# Patient Record
Sex: MALE | Race: WHITE | NOT HISPANIC OR LATINO
[De-identification: names, ages, dates, MRNs, and addresses within clinical notes are randomized per-mention and may not be internally consistent; named-entity substitution may affect disease eponyms.]

---

## 2017-01-09 ENCOUNTER — APPOINTMENT (OUTPATIENT)
Dept: CT IMAGING | Facility: CLINIC | Age: 72
End: 2017-01-09

## 2017-01-09 ENCOUNTER — OUTPATIENT (OUTPATIENT)
Dept: OUTPATIENT SERVICES | Facility: HOSPITAL | Age: 72
LOS: 1 days | End: 2017-01-09
Payer: MEDICARE

## 2017-01-09 DIAGNOSIS — Z00.8 ENCOUNTER FOR OTHER GENERAL EXAMINATION: ICD-10-CM

## 2017-01-09 DIAGNOSIS — Z98.89 OTHER SPECIFIED POSTPROCEDURAL STATES: Chronic | ICD-10-CM

## 2017-01-09 PROCEDURE — 82565 ASSAY OF CREATININE: CPT

## 2017-01-09 PROCEDURE — 70496 CT ANGIOGRAPHY HEAD: CPT

## 2017-01-09 PROCEDURE — 70498 CT ANGIOGRAPHY NECK: CPT

## 2017-02-21 ENCOUNTER — INPATIENT (INPATIENT)
Facility: HOSPITAL | Age: 72
LOS: 2 days | Discharge: HOME | End: 2017-02-24
Attending: STUDENT IN AN ORGANIZED HEALTH CARE EDUCATION/TRAINING PROGRAM | Admitting: INTERNAL MEDICINE
Payer: MEDICARE

## 2017-02-21 VITALS
WEIGHT: 190.04 LBS | HEIGHT: 71 IN | OXYGEN SATURATION: 100 % | DIASTOLIC BLOOD PRESSURE: 87 MMHG | HEART RATE: 72 BPM | SYSTOLIC BLOOD PRESSURE: 151 MMHG | RESPIRATION RATE: 18 BRPM

## 2017-02-21 DIAGNOSIS — Z98.89 OTHER SPECIFIED POSTPROCEDURAL STATES: Chronic | ICD-10-CM

## 2017-02-21 LAB
ALBUMIN SERPL ELPH-MCNC: 3.4 G/DL — SIGNIFICANT CHANGE UP (ref 3.3–5)
ALP SERPL-CCNC: 96 U/L — SIGNIFICANT CHANGE UP (ref 40–120)
ALT FLD-CCNC: 21 U/L — SIGNIFICANT CHANGE UP (ref 12–78)
AMMONIA BLD-MCNC: 42 UMOL/L — HIGH (ref 11–32)
AMPHET UR-MCNC: NEGATIVE — SIGNIFICANT CHANGE UP
ANION GAP SERPL CALC-SCNC: 7 MMOL/L — SIGNIFICANT CHANGE UP (ref 5–17)
APPEARANCE UR: CLEAR — SIGNIFICANT CHANGE UP
APTT BLD: 33 SEC — SIGNIFICANT CHANGE UP (ref 27.5–37.4)
AST SERPL-CCNC: 21 U/L — SIGNIFICANT CHANGE UP (ref 15–37)
BACTERIA # UR AUTO: (no result)
BARBITURATES UR SCN-MCNC: NEGATIVE — SIGNIFICANT CHANGE UP
BASOPHILS # BLD AUTO: 0.1 K/UL — SIGNIFICANT CHANGE UP (ref 0–0.2)
BASOPHILS NFR BLD AUTO: 1.3 % — SIGNIFICANT CHANGE UP (ref 0–2)
BENZODIAZ UR-MCNC: NEGATIVE — SIGNIFICANT CHANGE UP
BILIRUB SERPL-MCNC: 0.3 MG/DL — SIGNIFICANT CHANGE UP (ref 0.2–1.2)
BILIRUB UR-MCNC: NEGATIVE — SIGNIFICANT CHANGE UP
BLD GP AB SCN SERPL QL: SIGNIFICANT CHANGE UP
BUN SERPL-MCNC: 24 MG/DL — HIGH (ref 7–23)
CALCIUM SERPL-MCNC: 8.3 MG/DL — LOW (ref 8.5–10.1)
CARBAMAZEPINE SERPL-MCNC: <0.5 UG/ML — LOW (ref 4–12)
CHLORIDE SERPL-SCNC: 104 MMOL/L — SIGNIFICANT CHANGE UP (ref 96–108)
CK SERPL-CCNC: 86 U/L — SIGNIFICANT CHANGE UP (ref 26–308)
CO2 SERPL-SCNC: 26 MMOL/L — SIGNIFICANT CHANGE UP (ref 22–31)
COCAINE METAB.OTHER UR-MCNC: NEGATIVE — SIGNIFICANT CHANGE UP
COLOR SPEC: YELLOW — SIGNIFICANT CHANGE UP
CREAT SERPL-MCNC: 1.24 MG/DL — SIGNIFICANT CHANGE UP (ref 0.5–1.3)
DIFF PNL FLD: (no result)
EOSINOPHIL # BLD AUTO: 0.2 K/UL — SIGNIFICANT CHANGE UP (ref 0–0.5)
EOSINOPHIL NFR BLD AUTO: 4.5 % — SIGNIFICANT CHANGE UP (ref 0–6)
EPI CELLS # UR: SIGNIFICANT CHANGE UP
ETHANOL SERPL-MCNC: <10 MG/DL — SIGNIFICANT CHANGE UP (ref 0–10)
GLUCOSE SERPL-MCNC: 204 MG/DL — HIGH (ref 70–99)
GLUCOSE UR QL: 100 MG/DL
HCT VFR BLD CALC: 38.4 % — LOW (ref 39–50)
HGB BLD-MCNC: 13.9 G/DL — SIGNIFICANT CHANGE UP (ref 13–17)
INR BLD: 1.04 RATIO — SIGNIFICANT CHANGE UP (ref 0.88–1.16)
KETONES UR-MCNC: NEGATIVE — SIGNIFICANT CHANGE UP
LEUKOCYTE ESTERASE UR-ACNC: NEGATIVE — SIGNIFICANT CHANGE UP
LYMPHOCYTES # BLD AUTO: 1 K/UL — SIGNIFICANT CHANGE UP (ref 1–3.3)
LYMPHOCYTES # BLD AUTO: 17.9 % — SIGNIFICANT CHANGE UP (ref 13–44)
MCHC RBC-ENTMCNC: 32.2 PG — SIGNIFICANT CHANGE UP (ref 27–34)
MCHC RBC-ENTMCNC: 36.2 GM/DL — HIGH (ref 32–36)
MCV RBC AUTO: 89.1 FL — SIGNIFICANT CHANGE UP (ref 80–100)
METHADONE UR-MCNC: NEGATIVE — SIGNIFICANT CHANGE UP
MONOCYTES # BLD AUTO: 0.6 K/UL — SIGNIFICANT CHANGE UP (ref 0–0.9)
MONOCYTES NFR BLD AUTO: 12.2 % — SIGNIFICANT CHANGE UP (ref 2–14)
NEUTROPHILS # BLD AUTO: 3.4 K/UL — SIGNIFICANT CHANGE UP (ref 1.8–7.4)
NEUTROPHILS NFR BLD AUTO: 64.1 % — SIGNIFICANT CHANGE UP (ref 43–77)
NITRITE UR-MCNC: NEGATIVE — SIGNIFICANT CHANGE UP
OPIATES UR-MCNC: NEGATIVE — SIGNIFICANT CHANGE UP
PCP SPEC-MCNC: SIGNIFICANT CHANGE UP
PCP UR-MCNC: NEGATIVE — SIGNIFICANT CHANGE UP
PH UR: 5 — SIGNIFICANT CHANGE UP (ref 4.8–8)
PLATELET # BLD AUTO: 212 K/UL — SIGNIFICANT CHANGE UP (ref 150–400)
POTASSIUM SERPL-MCNC: 4.4 MMOL/L — SIGNIFICANT CHANGE UP (ref 3.5–5.3)
POTASSIUM SERPL-SCNC: 4.4 MMOL/L — SIGNIFICANT CHANGE UP (ref 3.5–5.3)
PROT SERPL-MCNC: 6.9 GM/DL — SIGNIFICANT CHANGE UP (ref 6–8.3)
PROT UR-MCNC: 100 MG/DL
PROTHROM AB SERPL-ACNC: 11.5 SEC — SIGNIFICANT CHANGE UP (ref 10–13.1)
RBC # BLD: 4.3 M/UL — SIGNIFICANT CHANGE UP (ref 4.2–5.8)
RBC # FLD: 11.6 % — SIGNIFICANT CHANGE UP (ref 10.3–14.5)
RBC CASTS # UR COMP ASSIST: (no result) /HPF (ref 0–4)
SODIUM SERPL-SCNC: 137 MMOL/L — SIGNIFICANT CHANGE UP (ref 135–145)
SP GR SPEC: 1.01 — SIGNIFICANT CHANGE UP (ref 1.01–1.02)
THC UR QL: NEGATIVE — SIGNIFICANT CHANGE UP
TROPONIN I SERPL-MCNC: <0.015 NG/ML — SIGNIFICANT CHANGE UP (ref 0.01–0.04)
TYPE + AB SCN PNL BLD: SIGNIFICANT CHANGE UP
UROBILINOGEN FLD QL: NEGATIVE MG/DL — SIGNIFICANT CHANGE UP
WBC # BLD: 5.3 K/UL — SIGNIFICANT CHANGE UP (ref 3.8–10.5)
WBC # FLD AUTO: 5.3 K/UL — SIGNIFICANT CHANGE UP (ref 3.8–10.5)
WBC UR QL: NEGATIVE — SIGNIFICANT CHANGE UP

## 2017-02-21 PROCEDURE — 70450 CT HEAD/BRAIN W/O DYE: CPT | Mod: 26

## 2017-02-21 PROCEDURE — 99285 EMERGENCY DEPT VISIT HI MDM: CPT

## 2017-02-21 PROCEDURE — 71010: CPT | Mod: 26

## 2017-02-21 RX ORDER — LEVETIRACETAM 250 MG/1
1000 TABLET, FILM COATED ORAL ONCE
Qty: 0 | Refills: 0 | Status: COMPLETED | OUTPATIENT
Start: 2017-02-21 | End: 2017-02-21

## 2017-02-21 RX ADMIN — LEVETIRACETAM 400 MILLIGRAM(S): 250 TABLET, FILM COATED ORAL at 18:23

## 2017-02-21 NOTE — ED ADULT NURSE REASSESSMENT NOTE - NS ED NURSE REASSESS COMMENT FT1
Received patient in bed, A&Ox4, in no acute distress at this time. Pending bed assignment. Cardiac monitoring in progress. Will continue monitoring patient.

## 2017-02-21 NOTE — H&P ADULT - RS GEN PE MLT RESP DETAILS PC
no chest wall tenderness/respirations non-labored/no rales/breath sounds equal/airway patent/normal/good air movement/no wheezes/no rhonchi/clear to auscultation bilaterally
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
## 2017-02-21 NOTE — ED ADULT NURSE NOTE - OBJECTIVE STATEMENT
Rec'd pt s/p seizure earlier today. pt has PMH hx of seizures from childhood. Pt verbalized that he does not feel total control of what is going on the last few days and not 100% right. pt  A&0x4. VSS. IVL estab. labs drawn. no acute distress, will mtr.
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
## 2017-02-21 NOTE — H&P ADULT - NSHPLABSRESULTS_GEN_ALL_CORE
T(C): 36.8, Max: 36.8 (02-21 @ 19:31)  HR: 65 (65 - 72)  BP: 141/76 (141/76 - 151/87)  RR: 16 (15 - 18)  SpO2: 99% (99% - 100%)      EXAM:  CT BRAIN                            PROCEDURE DATE:  02/21/2017      IMPRESSION:     No acute intracranial hemorrhage, mass effect, or midline shift.     Stable left parietal gyral calcification and atrophy in association with   Sturge-Quinonez syndrome.

## 2017-02-21 NOTE — H&P ADULT - ASSESSMENT
72 y/o M with a hx of PMHx notable for HTN, hyperlipidemia, vertebral artery dissection DM type II, CKD III, asthma, and seizures (on keppra  & trileptal), sturge lorenzo syndrome, epilepsy, presents to ED BIBA s/p seizure. Being admitted for seizures while on keppra and trileptal 70 y/o M with a hx of PMHx notable for HTN, hyperlipidemia, vertebral artery dissection DM type II, CKD III, asthma, and seizures (on keppra  & trileptal), sturge lorenzo syndrome, epilepsy, presents to ED BIBA s/p seizure. Being admitted for seizures while on keppra and trileptal.
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
## 2017-02-21 NOTE — ED PROVIDER NOTE - NS ED MD SCRIBE ATTENDING SCRIBE SECTIONS
PAST MEDICAL/SURGICAL/SOCIAL HISTORY/REVIEW OF SYSTEMS/PHYSICAL EXAM/RESULTS/DISPOSITION/HISTORY OF PRESENT ILLNESS/CONSULTATIONS/SHIFT CHANGE/PROGRESS NOTE

## 2017-02-21 NOTE — ED PROVIDER NOTE - OBJECTIVE STATEMENT
70 y/o M with a hx of vertebral artery dissection, epilepsy, HTN, DM, CKD presents to ED BIBA s/p wagner.  Pt states he does not have any complaints at this time. Pt states he has had intermittent difficulty with coordination and "not feeling totally in control of himself." Today pt was in [employer] and had 2 seizures. Pt lives at home alone.

## 2017-02-21 NOTE — H&P ADULT - PROBLEM SELECTOR PLAN 1
# Breakthrough Seizure  - Cont Keppra and Trileptal  - Consider MRI of the brain  - Neuro Consult  - Monitor on tele  - Trileptal level low, consider increasing the dosage  - Fall and Aspiration precaution  - Ativan IV PRN for seizure like activity # Breakthrough Seizure  - Cont Keppra and Trileptal  - Consider MRI of the brain  - Neuro Consult  - Monitor on tele  - Trileptal level low, consider increasing the dosage  - F/U Keppra level in the am  - Fall, Seizure and Aspiration precautions  - Ativan IV PRN for seizure like activity

## 2017-02-21 NOTE — H&P ADULT - PROBLEM SELECTOR PLAN 8
# Most likely 2/2 Renal disease vs Muscle exertion 2/2 to Seizures  - Out pt follow up and monitoring

## 2017-02-21 NOTE — ED PROVIDER NOTE - DETAILS:
I, Dwight Bajwa, performed the initial face to face bedside interview with this patient regarding history of present illness and determined that the patient should be seen in the main ED.  The history, was documented by the scribe in my presence and I attest to the accuracy of the documentation.

## 2017-02-22 DIAGNOSIS — E72.20 DISORDER OF UREA CYCLE METABOLISM, UNSPECIFIED: ICD-10-CM

## 2017-02-22 DIAGNOSIS — H53.2 DIPLOPIA: ICD-10-CM

## 2017-02-22 DIAGNOSIS — E03.9 HYPOTHYROIDISM, UNSPECIFIED: ICD-10-CM

## 2017-02-22 DIAGNOSIS — I10 ESSENTIAL (PRIMARY) HYPERTENSION: ICD-10-CM

## 2017-02-22 DIAGNOSIS — Z29.9 ENCOUNTER FOR PROPHYLACTIC MEASURES, UNSPECIFIED: ICD-10-CM

## 2017-02-22 DIAGNOSIS — E78.5 HYPERLIPIDEMIA, UNSPECIFIED: ICD-10-CM

## 2017-02-22 DIAGNOSIS — R56.9 UNSPECIFIED CONVULSIONS: ICD-10-CM

## 2017-02-22 DIAGNOSIS — N18.3 CHRONIC KIDNEY DISEASE, STAGE 3 (MODERATE): ICD-10-CM

## 2017-02-22 DIAGNOSIS — E11.9 TYPE 2 DIABETES MELLITUS WITHOUT COMPLICATIONS: ICD-10-CM

## 2017-02-22 DIAGNOSIS — Q85.8 OTHER PHAKOMATOSES, NOT ELSEWHERE CLASSIFIED: ICD-10-CM

## 2017-02-22 LAB
ALBUMIN SERPL ELPH-MCNC: 2.6 G/DL — LOW (ref 3.3–5)
ALBUMIN SERPL ELPH-MCNC: 2.9 G/DL — LOW (ref 3.3–5)
ALP SERPL-CCNC: 73 U/L — SIGNIFICANT CHANGE UP (ref 40–120)
ALP SERPL-CCNC: 82 U/L — SIGNIFICANT CHANGE UP (ref 40–120)
ALT FLD-CCNC: 14 U/L — SIGNIFICANT CHANGE UP (ref 12–78)
ALT FLD-CCNC: 14 U/L — SIGNIFICANT CHANGE UP (ref 12–78)
ANION GAP SERPL CALC-SCNC: 8 MMOL/L — SIGNIFICANT CHANGE UP (ref 5–17)
ANION GAP SERPL CALC-SCNC: 9 MMOL/L — SIGNIFICANT CHANGE UP (ref 5–17)
APPEARANCE UR: CLEAR — SIGNIFICANT CHANGE UP
AST SERPL-CCNC: 15 U/L — SIGNIFICANT CHANGE UP (ref 15–37)
AST SERPL-CCNC: 19 U/L — SIGNIFICANT CHANGE UP (ref 15–37)
BACTERIA # UR AUTO: (no result)
BASOPHILS # BLD AUTO: 0.1 K/UL — SIGNIFICANT CHANGE UP (ref 0–0.2)
BASOPHILS NFR BLD AUTO: 0.7 % — SIGNIFICANT CHANGE UP (ref 0–2)
BILIRUB SERPL-MCNC: 0.4 MG/DL — SIGNIFICANT CHANGE UP (ref 0.2–1.2)
BILIRUB SERPL-MCNC: 0.5 MG/DL — SIGNIFICANT CHANGE UP (ref 0.2–1.2)
BILIRUB UR-MCNC: NEGATIVE — SIGNIFICANT CHANGE UP
BUN SERPL-MCNC: 19 MG/DL — SIGNIFICANT CHANGE UP (ref 7–23)
BUN SERPL-MCNC: 22 MG/DL — SIGNIFICANT CHANGE UP (ref 7–23)
CALCIUM SERPL-MCNC: 7.6 MG/DL — LOW (ref 8.5–10.1)
CALCIUM SERPL-MCNC: 8.1 MG/DL — LOW (ref 8.5–10.1)
CHLORIDE SERPL-SCNC: 102 MMOL/L — SIGNIFICANT CHANGE UP (ref 96–108)
CHLORIDE SERPL-SCNC: 106 MMOL/L — SIGNIFICANT CHANGE UP (ref 96–108)
CO2 SERPL-SCNC: 23 MMOL/L — SIGNIFICANT CHANGE UP (ref 22–31)
CO2 SERPL-SCNC: 27 MMOL/L — SIGNIFICANT CHANGE UP (ref 22–31)
COLOR SPEC: YELLOW — SIGNIFICANT CHANGE UP
CREAT SERPL-MCNC: 1.07 MG/DL — SIGNIFICANT CHANGE UP (ref 0.5–1.3)
CREAT SERPL-MCNC: 1.42 MG/DL — HIGH (ref 0.5–1.3)
CULTURE RESULTS: NO GROWTH — SIGNIFICANT CHANGE UP
DIFF PNL FLD: (no result)
EOSINOPHIL # BLD AUTO: 0.1 K/UL — SIGNIFICANT CHANGE UP (ref 0–0.5)
EOSINOPHIL NFR BLD AUTO: 0.7 % — SIGNIFICANT CHANGE UP (ref 0–6)
EPI CELLS # UR: SIGNIFICANT CHANGE UP
GLUCOSE SERPL-MCNC: 211 MG/DL — HIGH (ref 70–99)
GLUCOSE SERPL-MCNC: 274 MG/DL — HIGH (ref 70–99)
GLUCOSE UR QL: 50 MG/DL
HBA1C BLD-MCNC: 8.3 % — HIGH (ref 4–5.6)
HCT VFR BLD CALC: 33.2 % — LOW (ref 39–50)
HCT VFR BLD CALC: 34.9 % — LOW (ref 39–50)
HGB BLD-MCNC: 11.9 G/DL — LOW (ref 13–17)
HGB BLD-MCNC: 12.2 G/DL — LOW (ref 13–17)
HMPV RNA SPEC QL NAA+PROBE: DETECTED
KETONES UR-MCNC: NEGATIVE — SIGNIFICANT CHANGE UP
LACTATE SERPL-SCNC: 1.7 MMOL/L — SIGNIFICANT CHANGE UP (ref 0.7–2)
LEUKOCYTE ESTERASE UR-ACNC: NEGATIVE — SIGNIFICANT CHANGE UP
LYMPHOCYTES # BLD AUTO: 0.8 K/UL — LOW (ref 1–3.3)
LYMPHOCYTES # BLD AUTO: 9.8 % — LOW (ref 13–44)
MAGNESIUM SERPL-MCNC: 1.9 MG/DL — SIGNIFICANT CHANGE UP (ref 1.8–2.4)
MCHC RBC-ENTMCNC: 30.9 PG — SIGNIFICANT CHANGE UP (ref 27–34)
MCHC RBC-ENTMCNC: 31.4 PG — SIGNIFICANT CHANGE UP (ref 27–34)
MCHC RBC-ENTMCNC: 34.9 GM/DL — SIGNIFICANT CHANGE UP (ref 32–36)
MCHC RBC-ENTMCNC: 35.8 GM/DL — SIGNIFICANT CHANGE UP (ref 32–36)
MCV RBC AUTO: 87.6 FL — SIGNIFICANT CHANGE UP (ref 80–100)
MCV RBC AUTO: 88.5 FL — SIGNIFICANT CHANGE UP (ref 80–100)
MONOCYTES # BLD AUTO: 0.8 K/UL — SIGNIFICANT CHANGE UP (ref 0–0.9)
MONOCYTES NFR BLD AUTO: 10.5 % — SIGNIFICANT CHANGE UP (ref 2–14)
NEUTROPHILS # BLD AUTO: 6.2 K/UL — SIGNIFICANT CHANGE UP (ref 1.8–7.4)
NEUTROPHILS NFR BLD AUTO: 78.3 % — HIGH (ref 43–77)
NITRITE UR-MCNC: NEGATIVE — SIGNIFICANT CHANGE UP
PH UR: 5 — SIGNIFICANT CHANGE UP (ref 4.8–8)
PHOSPHATE SERPL-MCNC: 2.5 MG/DL — SIGNIFICANT CHANGE UP (ref 2.5–4.5)
PLATELET # BLD AUTO: 177 K/UL — SIGNIFICANT CHANGE UP (ref 150–400)
PLATELET # BLD AUTO: 206 K/UL — SIGNIFICANT CHANGE UP (ref 150–400)
POTASSIUM SERPL-MCNC: 4 MMOL/L — SIGNIFICANT CHANGE UP (ref 3.5–5.3)
POTASSIUM SERPL-MCNC: 4.6 MMOL/L — SIGNIFICANT CHANGE UP (ref 3.5–5.3)
POTASSIUM SERPL-SCNC: 4 MMOL/L — SIGNIFICANT CHANGE UP (ref 3.5–5.3)
POTASSIUM SERPL-SCNC: 4.6 MMOL/L — SIGNIFICANT CHANGE UP (ref 3.5–5.3)
PROT SERPL-MCNC: 5.6 GM/DL — LOW (ref 6–8.3)
PROT SERPL-MCNC: 5.9 GM/DL — LOW (ref 6–8.3)
PROT UR-MCNC: 100 MG/DL
RAPID RVP RESULT: DETECTED
RBC # BLD: 3.79 M/UL — LOW (ref 4.2–5.8)
RBC # BLD: 3.94 M/UL — LOW (ref 4.2–5.8)
RBC # FLD: 11.6 % — SIGNIFICANT CHANGE UP (ref 10.3–14.5)
RBC # FLD: 12 % — SIGNIFICANT CHANGE UP (ref 10.3–14.5)
RBC CASTS # UR COMP ASSIST: (no result) /HPF (ref 0–4)
SODIUM SERPL-SCNC: 137 MMOL/L — SIGNIFICANT CHANGE UP (ref 135–145)
SODIUM SERPL-SCNC: 138 MMOL/L — SIGNIFICANT CHANGE UP (ref 135–145)
SP GR SPEC: 1.01 — SIGNIFICANT CHANGE UP (ref 1.01–1.02)
SPECIMEN SOURCE: SIGNIFICANT CHANGE UP
TSH SERPL-MCNC: 1.86 UIU/ML — SIGNIFICANT CHANGE UP (ref 0.36–3.74)
UROBILINOGEN FLD QL: NEGATIVE MG/DL — SIGNIFICANT CHANGE UP
WBC # BLD: 5.7 K/UL — SIGNIFICANT CHANGE UP (ref 3.8–10.5)
WBC # BLD: 7.9 K/UL — SIGNIFICANT CHANGE UP (ref 3.8–10.5)
WBC # FLD AUTO: 5.7 K/UL — SIGNIFICANT CHANGE UP (ref 3.8–10.5)
WBC # FLD AUTO: 7.9 K/UL — SIGNIFICANT CHANGE UP (ref 3.8–10.5)
WBC UR QL: SIGNIFICANT CHANGE UP

## 2017-02-22 PROCEDURE — 70547 MR ANGIOGRAPHY NECK W/O DYE: CPT | Mod: 26

## 2017-02-22 PROCEDURE — 95951: CPT | Mod: 26,52,59

## 2017-02-22 PROCEDURE — 70551 MRI BRAIN STEM W/O DYE: CPT | Mod: 26

## 2017-02-22 PROCEDURE — 95812 EEG 41-60 MINUTES: CPT | Mod: 26

## 2017-02-22 PROCEDURE — 71020: CPT | Mod: 26

## 2017-02-22 PROCEDURE — 99223 1ST HOSP IP/OBS HIGH 75: CPT

## 2017-02-22 RX ORDER — ATORVASTATIN CALCIUM 80 MG/1
20 TABLET, FILM COATED ORAL AT BEDTIME
Qty: 0 | Refills: 0 | Status: DISCONTINUED | OUTPATIENT
Start: 2017-02-22 | End: 2017-02-24

## 2017-02-22 RX ORDER — DEXTROSE 50 % IN WATER 50 %
12.5 SYRINGE (ML) INTRAVENOUS ONCE
Qty: 0 | Refills: 0 | Status: DISCONTINUED | OUTPATIENT
Start: 2017-02-22 | End: 2017-02-24

## 2017-02-22 RX ORDER — DEXTROSE 50 % IN WATER 50 %
25 SYRINGE (ML) INTRAVENOUS ONCE
Qty: 0 | Refills: 0 | Status: DISCONTINUED | OUTPATIENT
Start: 2017-02-22 | End: 2017-02-24

## 2017-02-22 RX ORDER — ALPRAZOLAM 0.25 MG
0.25 TABLET ORAL ONCE
Qty: 0 | Refills: 0 | Status: DISCONTINUED | OUTPATIENT
Start: 2017-02-22 | End: 2017-02-22

## 2017-02-22 RX ORDER — OXCARBAZEPINE 300 MG/1
450 TABLET, FILM COATED ORAL
Qty: 0 | Refills: 0 | Status: DISCONTINUED | OUTPATIENT
Start: 2017-02-22 | End: 2017-02-24

## 2017-02-22 RX ORDER — LEVETIRACETAM 250 MG/1
500 TABLET, FILM COATED ORAL ONCE
Qty: 0 | Refills: 0 | Status: COMPLETED | OUTPATIENT
Start: 2017-02-22 | End: 2017-02-22

## 2017-02-22 RX ORDER — DEXTROSE 50 % IN WATER 50 %
1 SYRINGE (ML) INTRAVENOUS ONCE
Qty: 0 | Refills: 0 | Status: DISCONTINUED | OUTPATIENT
Start: 2017-02-22 | End: 2017-02-24

## 2017-02-22 RX ORDER — ALPRAZOLAM 0.25 MG
0.25 TABLET ORAL AT BEDTIME
Qty: 0 | Refills: 0 | Status: DISCONTINUED | OUTPATIENT
Start: 2017-02-22 | End: 2017-02-24

## 2017-02-22 RX ORDER — GLUCAGON INJECTION, SOLUTION 0.5 MG/.1ML
1 INJECTION, SOLUTION SUBCUTANEOUS ONCE
Qty: 0 | Refills: 0 | Status: DISCONTINUED | OUTPATIENT
Start: 2017-02-22 | End: 2017-02-24

## 2017-02-22 RX ORDER — METOPROLOL TARTRATE 50 MG
50 TABLET ORAL
Qty: 0 | Refills: 0 | Status: DISCONTINUED | OUTPATIENT
Start: 2017-02-22 | End: 2017-02-24

## 2017-02-22 RX ORDER — SODIUM CHLORIDE 9 MG/ML
1000 INJECTION, SOLUTION INTRAVENOUS
Qty: 0 | Refills: 0 | Status: DISCONTINUED | OUTPATIENT
Start: 2017-02-22 | End: 2017-02-24

## 2017-02-22 RX ORDER — ACETAMINOPHEN 500 MG
650 TABLET ORAL ONCE
Qty: 0 | Refills: 0 | Status: COMPLETED | OUTPATIENT
Start: 2017-02-22 | End: 2017-02-22

## 2017-02-22 RX ORDER — INSULIN LISPRO 100/ML
VIAL (ML) SUBCUTANEOUS
Qty: 0 | Refills: 0 | Status: DISCONTINUED | OUTPATIENT
Start: 2017-02-22 | End: 2017-02-24

## 2017-02-22 RX ORDER — OXCARBAZEPINE 300 MG/1
450 TABLET, FILM COATED ORAL ONCE
Qty: 0 | Refills: 0 | Status: COMPLETED | OUTPATIENT
Start: 2017-02-22 | End: 2017-02-22

## 2017-02-22 RX ORDER — PANTOPRAZOLE SODIUM 20 MG/1
40 TABLET, DELAYED RELEASE ORAL
Qty: 0 | Refills: 0 | Status: DISCONTINUED | OUTPATIENT
Start: 2017-02-22 | End: 2017-02-24

## 2017-02-22 RX ORDER — LEVOTHYROXINE SODIUM 125 MCG
75 TABLET ORAL AT BEDTIME
Qty: 0 | Refills: 0 | Status: DISCONTINUED | OUTPATIENT
Start: 2017-02-22 | End: 2017-02-24

## 2017-02-22 RX ORDER — HEPARIN SODIUM 5000 [USP'U]/ML
5000 INJECTION INTRAVENOUS; SUBCUTANEOUS EVERY 12 HOURS
Qty: 0 | Refills: 0 | Status: DISCONTINUED | OUTPATIENT
Start: 2017-02-22 | End: 2017-02-22

## 2017-02-22 RX ORDER — INSULIN LISPRO 100/ML
VIAL (ML) SUBCUTANEOUS AT BEDTIME
Qty: 0 | Refills: 0 | Status: DISCONTINUED | OUTPATIENT
Start: 2017-02-22 | End: 2017-02-24

## 2017-02-22 RX ORDER — SODIUM CHLORIDE 9 MG/ML
1000 INJECTION INTRAMUSCULAR; INTRAVENOUS; SUBCUTANEOUS ONCE
Qty: 0 | Refills: 0 | Status: COMPLETED | OUTPATIENT
Start: 2017-02-22 | End: 2017-02-22

## 2017-02-22 RX ORDER — LEVETIRACETAM 250 MG/1
1500 TABLET, FILM COATED ORAL ONCE
Qty: 0 | Refills: 0 | Status: DISCONTINUED | OUTPATIENT
Start: 2017-02-22 | End: 2017-02-22

## 2017-02-22 RX ORDER — LEVETIRACETAM 250 MG/1
1500 TABLET, FILM COATED ORAL
Qty: 0 | Refills: 0 | Status: DISCONTINUED | OUTPATIENT
Start: 2017-02-22 | End: 2017-02-24

## 2017-02-22 RX ADMIN — Medication 5 MILLIGRAM(S): at 07:00

## 2017-02-22 RX ADMIN — OXCARBAZEPINE 450 MILLIGRAM(S): 300 TABLET, FILM COATED ORAL at 11:34

## 2017-02-22 RX ADMIN — Medication 75 MICROGRAM(S): at 23:02

## 2017-02-22 RX ADMIN — Medication 3: at 12:11

## 2017-02-22 RX ADMIN — Medication 0.25 MILLIGRAM(S): at 02:01

## 2017-02-22 RX ADMIN — LEVETIRACETAM 500 MILLIGRAM(S): 250 TABLET, FILM COATED ORAL at 02:01

## 2017-02-22 RX ADMIN — LEVETIRACETAM 1500 MILLIGRAM(S): 250 TABLET, FILM COATED ORAL at 23:01

## 2017-02-22 RX ADMIN — Medication 2: at 08:57

## 2017-02-22 RX ADMIN — LEVETIRACETAM 1500 MILLIGRAM(S): 250 TABLET, FILM COATED ORAL at 11:33

## 2017-02-22 RX ADMIN — Medication 0.25 MILLIGRAM(S): at 23:02

## 2017-02-22 RX ADMIN — Medication 50 MILLIGRAM(S): at 03:00

## 2017-02-22 RX ADMIN — Medication 50 MILLIGRAM(S): at 18:28

## 2017-02-22 RX ADMIN — OXCARBAZEPINE 450 MILLIGRAM(S): 300 TABLET, FILM COATED ORAL at 23:01

## 2017-02-22 RX ADMIN — Medication 2: at 18:29

## 2017-02-22 RX ADMIN — OXCARBAZEPINE 450 MILLIGRAM(S): 300 TABLET, FILM COATED ORAL at 02:01

## 2017-02-22 RX ADMIN — Medication 650 MILLIGRAM(S): at 18:37

## 2017-02-22 RX ADMIN — ATORVASTATIN CALCIUM 20 MILLIGRAM(S): 80 TABLET, FILM COATED ORAL at 23:01

## 2017-02-22 RX ADMIN — Medication 1: at 23:01

## 2017-02-22 RX ADMIN — SODIUM CHLORIDE 1000 MILLILITER(S): 9 INJECTION INTRAMUSCULAR; INTRAVENOUS; SUBCUTANEOUS at 18:31

## 2017-02-22 RX ADMIN — PANTOPRAZOLE SODIUM 40 MILLIGRAM(S): 20 TABLET, DELAYED RELEASE ORAL at 09:00
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
## 2017-02-22 NOTE — PROVIDER CONTACT NOTE (OTHER) - ACTION/TREATMENT ORDERED:
Dr. López aware via telephone. awaiting MD to assess pt. at bedside
at 19:15 pt. less confused pt. following commands at this time answering questions approriately
cardiology consult, awaiting oxycodone orders.
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
## 2017-02-22 NOTE — PROGRESS NOTE ADULT - SUBJECTIVE AND OBJECTIVE BOX
History of Present Illness: 	    70 y/o M with a hx of PMHx notable for HTN, hyperlipidemia, vertebral artery dissection DM type II, CKD III, asthma, and seizures (on keppra  & trileptal), sturge lorenzo syndrome, epilepsy, presents to ED BIBA s/p seizure.  Pt states he does not have any complaints at this time. Pt states he has had intermittent difficulty with coordination and "not feeling totally in control of himself." Pt lives at home alone.    Pt states for the past 2 weeks he hasn't felt well, he felt that something is not right, he became more forgetful and misplaced more things. 3 days ago pt states he had a seizure that he was unaware of , his friend at the [facility] told him about the seizure. Pt also reports for the past 3 months he has been noticing bite marks on the side of his tongue, also had double vision, was supposed to see a ophthalmologist but never followed up. Pt stated today he went to [employer] to eat as he goes there regularly Pt states he is unaware of anything that happened at [facility] but the manager called 911 as pt did not look well. Denies any CP, NVD, HA, confusion, Bowel or Bladder incontinence. Pt states he takes his medications on time and does not remember the last time he had a seizure, states 3 days ago he is sure he had a seizure also states he suspects he is having seizures at home that he does not realize as he does not have post ictal confusion, loss of bowel or bladder function.    : seen and eval. hemodynamically stable. Denies any HA, CP, SOB. No fevers, chills or shakes. no further seizures.     REVIEW OF SYSTEMS:  General: NAD, hemodynamically stable, (-)  fever, (-) chills, (-) weakness  HEENT:  Eyes:  No visual loss, blurred vision, double vision or yellow sclerae. Ears, Nose, Throat:  No hearing loss, sneezing, congestion, runny nose or sore throat.  SKIN:  No rash or itching.  CARDIOVASCULAR:  No chest pain, chest pressure or chest discomfort. No palpitations or edema.  RESPIRATORY:  No shortness of breath, cough or sputum.  GASTROINTESTINAL:  No anorexia, nausea, vomiting or diarrhea. No abdominal pain or blood.  NEUROLOGICAL:  No headache, dizziness, syncope, paralysis, ataxia, numbness or tingling in the extremities. No change in bowel or bladder control.  MUSCULOSKELETAL:  No muscle, back pain, joint pain or stiffness.  HEMATOLOGIC:  No anemia, bleeding or bruising.  LYMPHATICS:  No enlarged nodes. No history of splenectomy.  ENDOCRINOLOGIC:  No reports of sweating, cold or heat intolerance. No polyuria or polydipsia.  ALLERGIES:  No history of asthma, hives, eczema or rhinitis.    Physical Exam:   GENERAL APPEARANCE:  NAD, hemodynamically stable  T(C): 37.8, Max: 37.8 (- @ 10:10)  HR: 75 (65 - 81)  BP: 148/67 (141/76 - 197/93)  RR: 16 (13 - 22)  SpO2: 96% (96% - 100%)  Wt(kg): --  HEENT:  Head is normocephalic    Skin:  Warm and dry without any rash   NECK:  Supple without lymphadenopathy.   HEART:  Regular rate and rhythm. normal S1 and S2, No M/R/G  LUNGS:  Good ins/exp effort, no W/R/R/C  ABDOMEN:  Soft, nontender, nondistended with good bowel sounds heard  EXTREMITIES:  Without cyanosis, clubbing or edema.   NEUROLOGICAL:  Gross nonfocal       CBC Full  -  ( 2017 05:03 )  WBC Count : 5.7 K/uL  Hemoglobin : 12.2 g/dL  Hematocrit : 34.9 %  Platelet Count - Automated : 206 K/uL  Mean Cell Volume : 88.5 fl  Mean Cell Hemoglobin : 30.9 pg  Mean Cell Hemoglobin Concentration : 34.9 gm/dL  Auto Neutrophil # : x  Auto Lymphocyte # : x  Auto Monocyte # : x  Auto Eosinophil # : x  Auto Basophil # : x  Auto Neutrophil % : x  Auto Lymphocyte % : x  Auto Monocyte % : x  Auto Eosinophil % : x  Auto Basophil % : x    PT/INR - ( 2017 17:14 )   PT: 11.5 sec;   INR: 1.04 ratio         PTT - ( 2017 17:14 )  PTT:33.0 sec  Urinalysis Basic - ( 2017 21:02 )    Color: Yellow / Appearance: Clear / S.015 / pH: x  Gluc: x / Ketone: Negative  / Bili: Negative / Urobili: Negative mg/dL   Blood: x / Protein: 100 mg/dL / Nitrite: Negative   Leuk Esterase: Negative / RBC: 3-5 /HPF / WBC Negative   Sq Epi: x / Non Sq Epi: Few / Bacteria: Few      2017 05:03    137    |  102    |  19     ----------------------------<  211    4.0     |  27     |  1.07     Ca    8.1        2017 05:03  Phos  2.5       2017 05:03  Mg     1.9       2017 05:03    TPro  5.9    /  Alb  2.9    /  TBili  0.5    /  DBili  x      /  AST  19     /  ALT  14     /  AlkPhos  82     2017 05:03        *AMS / possible break through seizure  - Check Trileptal levels.  - EEG  - MRI brain with MRA h/o VA dissection in past.  - appreciated neuro follow up  - will call Dr. Bazan for med rec.     *Elevated ammonia level  -Normal transaminases   -Possibly 2/2 inborn errors of metabolism?  -Further work up outpatient    *HTN  -Low salt diet  -C/w home meds    *DM2  -Humalog ISS  -Diabetic diet    *Hypothyroidism  -C/w synthroid  -Check TSH    *DVT ppx  -SCDs.

## 2017-02-22 NOTE — PROGRESS NOTE ADULT - SUBJECTIVE AND OBJECTIVE BOX
RRT called earlier - Dr Whitten who admitted the patient a few hours earlier also at bedside.    Suggests that pts transient dysarthria/confusion is similar to the seizure episodes (atypical) that pt had prior to admission.    Pt resolved completely.    Dr Whitten asked to let Dr Gonzalez know about the event as she was admitting md    also asked resident team to call neurologist now and inform of status.

## 2017-02-22 NOTE — PROVIDER CONTACT NOTE (CHANGE IN STATUS NOTIFICATION) - ASSESSMENT
Confusion and expressive aphasia lasted approximately 5 minutes, twitching of arms and legs about 2 minutes.

## 2017-02-22 NOTE — PROVIDER CONTACT NOTE (OTHER) - SITUATION
pt. alert. pt. not cooperative not answering questions appropriately. temp 102.9
pt. /84
pt. admitted to [facility] from ED with SOB

## 2017-02-22 NOTE — PROVIDER CONTACT NOTE (OTHER) - ASSESSMENT
pt. change in mental status elevated temp. pt. forgetful
Blood pressure elevated
pt. sitting up in bed complaining of 7 out of 10 pain in lower back with out relief of tylenol. pt. short of breath. lung sounds diminished. O2 sat 95%

## 2017-02-22 NOTE — PROVIDER CONTACT NOTE (CHANGE IN STATUS NOTIFICATION) - SITUATION
Patient having expressive aphasia, confusion, twitching of arms and legs, and restlessness upon assessment.

## 2017-02-22 NOTE — CHART NOTE - NSCHARTNOTEFT_GEN_A_CORE
HPI:  70 y/o M with a hx of PMHx notable for HTN, hyperlipidemia, vertebral artery dissection DM type II, CKD III, asthma, and seizures (on keppra  & trileptal), sturge lorenzo syndrome, epilepsy, presents to ED BIBA s/p seizure.  Pt states he does not have any complaints at this time. Pt states he has had intermittent difficulty with coordination and "not feeling totally in control of himself." Pt lives at home alone.    Pt states for the past 2 weeks he hasn't felt well, he felt that something is not right, he became more forgetful and misplaced more things. 3 days ago pt states he had a seizure that he was unaware of , his friend at the [facility] told him about the seizure. Pt also reports for the past 3 months he has been noticing bite marks on the side of his tongue, also had double vision, was supposed to see a ophthalmologist but never followed up. Pt stated today he went to [facility] to eat as he goes there regularly Pt states he is unaware of anything that happened at [facility] but the manager called 911 as pt did not look well. Denies any CP, NVD, HA, confusion, Bowel or Bladder incontinence. Pt states he takes his medications on time and does not remember the last time he had a seizure, states 3 days ago he is sure he had a seizure also states he suspects he is having seizures at home that he does not realize as he does not have post ictal confusion, loss of bowel or bladder function. (21 Feb 2017 23:18)      called to evaluate the pt due to fever and increasing confusion. As per the nurse pt was found sitting in bed confused and felt warm to touch. When his temp was taken it was 102. He was also tachycardic. Pt examined bedside. Denies any pain. Reports lingering cough from URI 2 weeks ago. Denies any urinary symptoms, or GI symptoms.       Vital Signs Last 24 Hrs  T(F): 102,   HR: 92 (65 - 87)  BP: 141/71 (141/76 - 197/93)  RR: 17 (13 - 22)  SpO2: 96% (92% - 100%)    I&O's Summary  I & Os for 24h ending 22 Feb 2017 07:00  =============================================  IN: 120 ml / OUT: 800 ml / NET: -680 ml    I & Os for current day (as of 22 Feb 2017 18:10)  =============================================  IN: 240 ml / OUT: 0 ml / NET: 240 ml      CAPILLARY BLOOD GLUCOSE  289 (22 Feb 2017 11:31)  206 (22 Feb 2017 07:53)      PHYSICAL EXAM:  Constitutional: NAD, awake   Neck: Soft and supple  Respiratory: Breath sounds are clear bilaterally  Cardiovascular: S1 and S2, regular rate and rhythm  Gastrointestinal: Bowel Sounds present, soft, nontender, nondistended, no guarding, no rebound    Medications:  MEDICATIONS  (STANDING):  enalapril 5milliGRAM(s) Oral daily  levETIRAcetam 1500milliGRAM(s) Oral two times a day  OXcarbazepine 450milliGRAM(s) Oral two times a day  atorvastatin 20milliGRAM(s) Oral at bedtime  ALPRAZolam 0.25milliGRAM(s) Oral at bedtime  metoprolol 50milliGRAM(s) Oral two times a day  pantoprazole    Tablet 40milliGRAM(s) Oral before breakfast  levothyroxine 75MICROGram(s) Oral at bedtime  insulin lispro (HumaLOG) corrective regimen sliding scale  SubCutaneous three times a day before meals  insulin lispro (HumaLOG) corrective regimen sliding scale  SubCutaneous at bedtime  dextrose 5%. 1000milliLiter(s) IV Continuous <Continuous>  dextrose 50% Injectable 12.5Gram(s) IV Push once  dextrose 50% Injectable 25Gram(s) IV Push once  dextrose 50% Injectable 25Gram(s) IV Push once    MEDICATIONS  (PRN):  LORazepam   Injectable 4milliGRAM(s) IV Push once PRN Seizure activity  dextrose Gel 1Dose(s) Oral once PRN Blood Glucose LESS THAN 70 milliGRAM(s)/deciliter  glucagon  Injectable 1milliGRAM(s) IntraMuscular once PRN Glucose LESS THAN 70 milligrams/deciliter      Labs: All Labs Reviewed:                        12.2   5.7   )-----------( 206      ( 22 Feb 2017 05:03 )             34.9     22 Feb 2017 05:03    137    |  102    |  19     ----------------------------<  211    4.0     |  27     |  1.07     Ca    8.1        22 Feb 2017 05:03  Phos  2.5       22 Feb 2017 05:03  Mg     1.9       22 Feb 2017 05:03    TPro  5.9    /  Alb  2.9    /  TBili  0.5    /  DBili  x      /  AST  19     /  ALT  14     /  AlkPhos  82     22 Feb 2017 05:03    PT/INR - ( 21 Feb 2017 17:14 )   PT: 11.5 sec;   INR: 1.04 ratio         PTT - ( 21 Feb 2017 17:14 )  PTT:33.0 sec  CARDIAC MARKERS ( 21 Feb 2017 17:14 )  <0.015 ng/mL / x     / 86 U/L / x     / x        A/P   r/o sepsis   -f/u CBC, Lactate, UA, BC, chest x-ray   -IFV  -start antibiotics if indicated

## 2017-02-23 LAB
ANION GAP SERPL CALC-SCNC: 10 MMOL/L — SIGNIFICANT CHANGE UP (ref 5–17)
BASOPHILS # BLD AUTO: 0.1 K/UL — SIGNIFICANT CHANGE UP (ref 0–0.2)
BASOPHILS NFR BLD AUTO: 0.7 % — SIGNIFICANT CHANGE UP (ref 0–2)
BUN SERPL-MCNC: 21 MG/DL — SIGNIFICANT CHANGE UP (ref 7–23)
CALCIUM SERPL-MCNC: 7.7 MG/DL — LOW (ref 8.5–10.1)
CHLORIDE SERPL-SCNC: 105 MMOL/L — SIGNIFICANT CHANGE UP (ref 96–108)
CO2 SERPL-SCNC: 25 MMOL/L — SIGNIFICANT CHANGE UP (ref 22–31)
CREAT SERPL-MCNC: 1.18 MG/DL — SIGNIFICANT CHANGE UP (ref 0.5–1.3)
EOSINOPHIL # BLD AUTO: 0.1 K/UL — SIGNIFICANT CHANGE UP (ref 0–0.5)
EOSINOPHIL NFR BLD AUTO: 1.5 % — SIGNIFICANT CHANGE UP (ref 0–6)
GLUCOSE SERPL-MCNC: 178 MG/DL — HIGH (ref 70–99)
HCT VFR BLD CALC: 32.4 % — LOW (ref 39–50)
HGB BLD-MCNC: 11.2 G/DL — LOW (ref 13–17)
LACTATE SERPL-SCNC: 1.5 MMOL/L — SIGNIFICANT CHANGE UP (ref 0.7–2)
LEVETIRACETAM SERPL-MCNC: 37.6 MCG/ML — SIGNIFICANT CHANGE UP (ref 12–46)
LEVETIRACETAM SERPL-MCNC: 46 MCG/ML — SIGNIFICANT CHANGE UP (ref 12–46)
LYMPHOCYTES # BLD AUTO: 1 K/UL — SIGNIFICANT CHANGE UP (ref 1–3.3)
LYMPHOCYTES # BLD AUTO: 13.9 % — SIGNIFICANT CHANGE UP (ref 13–44)
MCHC RBC-ENTMCNC: 30.3 PG — SIGNIFICANT CHANGE UP (ref 27–34)
MCHC RBC-ENTMCNC: 34.5 GM/DL — SIGNIFICANT CHANGE UP (ref 32–36)
MCV RBC AUTO: 87.8 FL — SIGNIFICANT CHANGE UP (ref 80–100)
MONOCYTES # BLD AUTO: 0.8 K/UL — SIGNIFICANT CHANGE UP (ref 0–0.9)
MONOCYTES NFR BLD AUTO: 11.3 % — SIGNIFICANT CHANGE UP (ref 2–14)
NEUTROPHILS # BLD AUTO: 5.2 K/UL — SIGNIFICANT CHANGE UP (ref 1.8–7.4)
NEUTROPHILS NFR BLD AUTO: 72.6 % — SIGNIFICANT CHANGE UP (ref 43–77)
PLATELET # BLD AUTO: 187 K/UL — SIGNIFICANT CHANGE UP (ref 150–400)
POTASSIUM SERPL-MCNC: 4.2 MMOL/L — SIGNIFICANT CHANGE UP (ref 3.5–5.3)
POTASSIUM SERPL-SCNC: 4.2 MMOL/L — SIGNIFICANT CHANGE UP (ref 3.5–5.3)
RBC # BLD: 3.68 M/UL — LOW (ref 4.2–5.8)
RBC # FLD: 11.5 % — SIGNIFICANT CHANGE UP (ref 10.3–14.5)
SODIUM SERPL-SCNC: 140 MMOL/L — SIGNIFICANT CHANGE UP (ref 135–145)
WBC # BLD: 7.2 K/UL — SIGNIFICANT CHANGE UP (ref 3.8–10.5)
WBC # FLD AUTO: 7.2 K/UL — SIGNIFICANT CHANGE UP (ref 3.8–10.5)

## 2017-02-23 PROCEDURE — 99233 SBSQ HOSP IP/OBS HIGH 50: CPT

## 2017-02-23 RX ADMIN — LEVETIRACETAM 1500 MILLIGRAM(S): 250 TABLET, FILM COATED ORAL at 12:35

## 2017-02-23 RX ADMIN — PANTOPRAZOLE SODIUM 40 MILLIGRAM(S): 20 TABLET, DELAYED RELEASE ORAL at 06:19

## 2017-02-23 RX ADMIN — Medication 50 MILLIGRAM(S): at 18:05

## 2017-02-23 RX ADMIN — Medication 0.25 MILLIGRAM(S): at 22:43

## 2017-02-23 RX ADMIN — Medication 50 MILLIGRAM(S): at 06:19

## 2017-02-23 RX ADMIN — Medication 1: at 22:43

## 2017-02-23 RX ADMIN — Medication 2: at 18:05

## 2017-02-23 RX ADMIN — Medication 75 MICROGRAM(S): at 22:43

## 2017-02-23 RX ADMIN — Medication 5 MILLIGRAM(S): at 06:19

## 2017-02-23 RX ADMIN — OXCARBAZEPINE 450 MILLIGRAM(S): 300 TABLET, FILM COATED ORAL at 22:45

## 2017-02-23 RX ADMIN — Medication 2: at 08:53

## 2017-02-23 RX ADMIN — OXCARBAZEPINE 450 MILLIGRAM(S): 300 TABLET, FILM COATED ORAL at 12:35

## 2017-02-23 RX ADMIN — LEVETIRACETAM 1500 MILLIGRAM(S): 250 TABLET, FILM COATED ORAL at 22:44

## 2017-02-23 RX ADMIN — ATORVASTATIN CALCIUM 20 MILLIGRAM(S): 80 TABLET, FILM COATED ORAL at 22:43

## 2017-02-23 NOTE — PROGRESS NOTE ADULT - SUBJECTIVE AND OBJECTIVE BOX
CHIEF COMPLAINT: Seizure    SUBJECTIVE: 72 y/o M with a hx of  vertebral artery dissection, epilepsy, presents to ED BIBA s/p seizure.  Pt states he does not have any complaints at this time. Pt states he has had intermittent difficulty with coordination and "not feeling totally in control of himself." Pt lives at home alone.    Pt states for the past 2 weeks he hasn't felt well, he felt that something is not right, he became more forgetful and misplaced more things. 3 days ago pt states he had a seizure that he was unaware of , his friend at the [facility] told him about the seizure. Pt also reports for the past 3 months he has been noticing bite marks on the side of his tongue, also had double vision, was supposed to see a ophthalmologist but never followed up. Pt stated today he went to [facility] to eat as he goes there regularly Pt states he is unaware of anything that happened at [facility] but the manager called 911 as pt did not look well. Denies any CP, NVD, HA, confusion, Bowel or Bladder incontinence. Pt states he takes his medications on time and does not remember the last time he had a seizure, states 3 days ago he is sure he had a seizure also states he suspects he is having seizures at home that he does not realize as he does not have post ictal confusion, loss of bowel or bladder function.    2/22: Pt is seen and examined with attending Dr. Crowe. Pt is resting comfortably and in NAD. Pt denies any f/c/n/v/sob/headache/chest pain/abdominal pain.    2/23: Pt is seen and examined with attending Dr. Crowe. Pt is in isolation due to positive hMPV. Pt is resting comfortably and in NAD. Pt pulled leads off yesterday while 24hr EEG was being performed and pt states he does not remember removing the leads. EEG tech was present and reported pt was confused and his right arm shaking and left leg twitching independently lasting few secs.  Pt denies any f/c/n/v/sob/headache/chest pain/abdominal pain currently.    REVIEW OF SYSTEMS:    CONSTITUTIONAL: No weakness, fevers or chills  EYES/ENT: No visual changes;  No vertigo or throat pain   NECK: No pain or stiffness  RESPIRATORY: No cough, wheezing, hemoptysis; No shortness of breath  CARDIOVASCULAR: No chest pain or palpitations  GASTROINTESTINAL: No abdominal or epigastric pain. No nausea, vomiting, or hematemesis; No diarrhea or constipation. No melena or hematochezia.  GENITOURINARY: No dysuria, frequency or hematuria  NEUROLOGICAL: No numbness or weakness  SKIN: No itching, burning, rashes, or lesions   All other review of systems is negative unless indicated above    Vital Signs Last 24 Hrs  T(C): 37.2, Max: 39.4 (02-22 @ 18:09)  T(F): 99, Max: 102.9 (02-22 @ 18:09)  HR: 74 (72 - 103)  BP: 133/59 (113/41 - 179/84)  BP(mean): 78 (59 - 109)  RR: --  SpO2: 93% (93% - 99%)    I&O's Summary  I & Os for 24h ending 23 Feb 2017 07:00  =============================================  IN: 1480 ml / OUT: 0 ml / NET: 1480 ml    I & Os for current day (as of 23 Feb 2017 13:47)  =============================================  IN: 360 ml / OUT: 0 ml / NET: 360 ml      CAPILLARY BLOOD GLUCOSE  212 (23 Feb 2017 08:47)  274 (22 Feb 2017 22:30)      PHYSICAL EXAM:    Constitutional: NAD, awake and alert, well-developed  HEENT: PERR, EOMI, Normal Hearing, MMM  Neck: Soft and supple, No LAD, No JVD  Respiratory: Breath sounds are clear bilaterally, No wheezing, rales or rhonchi  Cardiovascular: S1 and S2, regular rate and rhythm, no Murmurs, gallops or rubs  Gastrointestinal: Bowel Sounds present, soft, nontender, nondistended, no guarding, no rebound  Extremities: No peripheral edema  Vascular: 2+ peripheral pulses  Neurological: A/O x 3, no focal deficits  Musculoskeletal: 5/5 strength b/l upper and lower extremities  Skin: No rashes    MEDICATIONS:  MEDICATIONS  (STANDING):  enalapril 5milliGRAM(s) Oral daily  levETIRAcetam 1500milliGRAM(s) Oral two times a day  OXcarbazepine 450milliGRAM(s) Oral two times a day  atorvastatin 20milliGRAM(s) Oral at bedtime  ALPRAZolam 0.25milliGRAM(s) Oral at bedtime  metoprolol 50milliGRAM(s) Oral two times a day  pantoprazole    Tablet 40milliGRAM(s) Oral before breakfast  levothyroxine 75MICROGram(s) Oral at bedtime  insulin lispro (HumaLOG) corrective regimen sliding scale  SubCutaneous three times a day before meals  insulin lispro (HumaLOG) corrective regimen sliding scale  SubCutaneous at bedtime  dextrose 5%. 1000milliLiter(s) IV Continuous <Continuous>  dextrose 50% Injectable 12.5Gram(s) IV Push once  dextrose 50% Injectable 25Gram(s) IV Push once  dextrose 50% Injectable 25Gram(s) IV Push once      LABS: All Labs Reviewed:                        11.2   7.2   )-----------( 187      ( 23 Feb 2017 04:46 )             32.4     23 Feb 2017 04:46    140    |  105    |  21     ----------------------------<  178    4.2     |  25     |  1.18     Ca    7.7        23 Feb 2017 04:46  Phos  2.5       22 Feb 2017 05:03  Mg     1.9       22 Feb 2017 05:03    TPro  5.6    /  Alb  2.6    /  TBili  0.4    /  DBili  x      /  AST  15     /  ALT  14     /  AlkPhos  73     22 Feb 2017 20:57    PT/INR - ( 21 Feb 2017 17:14 )   PT: 11.5 sec;   INR: 1.04 ratio         PTT - ( 21 Feb 2017 17:14 )  PTT:33.0 sec  CARDIAC MARKERS ( 21 Feb 2017 17:14 )  <0.015 ng/mL / x     / 86 U/L / x     / x          Blood Culture: 02-21 @ 21:02  Organism --  Gram Stain Blood -- Gram Stain --  Specimen Source .Urine None  Culture-Blood --        RADIOLOGY/EKG:  MRI brain: No acute infarct or other acute abnormality. Mild volume loss   of the left cerebrum, as seen on prior exam. Susceptibility signal within   the left posterior temporal lobe, compatible with known gyriform   calcifications, unchanged since prior exam.     MRA brain: No hemodynamically significant stenosis.    MRA neck:  Normal antegrade flow in the bilateral common carotid, and   cervical internal carotid. Left cervical vertebral artery is dominant and   appears unremarkable. 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   There is loss of signal of the distal cervical right vertebral artery,   likely compatible with stenosis.    DVT PPX:  Heparin 5000u subcut q12h    DISPOSITION: Discharge planning per clinical improvement.

## 2017-02-23 NOTE — PROGRESS NOTE ADULT - SUBJECTIVE AND OBJECTIVE BOX
History of Present Illness: 	    70 y/o M with a hx of PMHx notable for HTN, hyperlipidemia, vertebral artery dissection DM type II, CKD III, asthma, and seizures (on keppra  & trileptal), sturge lorenzo syndrome, epilepsy, presents to ED BIBA s/p seizure.  Pt states he does not have any complaints at this time. Pt states he has had intermittent difficulty with coordination and "not feeling totally in control of himself." Pt lives at home alone.    Pt states for the past 2 weeks he hasn't felt well, he felt that something is not right, he became more forgetful and misplaced more things. 3 days ago pt states he had a seizure that he was unaware of , his friend at the [facility] told him about the seizure. Pt also reports for the past 3 months he has been noticing bite marks on the side of his tongue, also had double vision, was supposed to see a ophthalmologist but never followed up. Pt stated today he went to [employer] to eat as he goes there regularly Pt states he is unaware of anything that happened at [facility] but the manager called 911 as pt did not look well. Denies any CP, NVD, HA, confusion, Bowel or Bladder incontinence. Pt states he takes his medications on time and does not remember the last time he had a seizure, states 3 days ago he is sure he had a seizure also states he suspects he is having seizures at home that he does not realize as he does not have post ictal confusion, loss of bowel or bladder function.    : seen and eval. hemodynamically stable. Denies any HA, CP, SOB. (+) hMPV, patient forgetful to what happened with EEG yesterday (pulled off). Stating (R) arm shaking and (L) leg twitching lasted only couple of sec. Labs and vitals reviewed.      REVIEW OF SYSTEMS:  General: NAD, hemodynamically stable, (-)  fever, (-) chills, (-) weakness  HEENT:  Eyes:  No visual loss, blurred vision, double vision or yellow sclerae. Ears, Nose, Throat:  No hearing loss, sneezing, congestion, runny nose or sore throat.  SKIN:  No rash or itching.  CARDIOVASCULAR:  No chest pain, chest pressure or chest discomfort. No palpitations or edema.  RESPIRATORY:  No shortness of breath, cough or sputum.  GASTROINTESTINAL:  No anorexia, nausea, vomiting or diarrhea. No abdominal pain or blood.  NEUROLOGICAL:  No headache, dizziness, syncope, paralysis, ataxia, numbness or tingling in the extremities. No change in bowel or bladder control.  MUSCULOSKELETAL:  No muscle, back pain, joint pain or stiffness.  HEMATOLOGIC:  No anemia, bleeding or bruising.  LYMPHATICS:  No enlarged nodes. No history of splenectomy.  ENDOCRINOLOGIC:  No reports of sweating, cold or heat intolerance. No polyuria or polydipsia.  ALLERGIES:  No history of asthma, hives, eczema or rhinitis.    Physical Exam:   GENERAL APPEARANCE:  NAD, hemodynamically stable  T(C): 37.8, Max: 37.8 (- @ 10:10)  HR: 75 (65 - 81)  BP: 148/67 (141/76 - 197/93)  RR: 16 (13 - 22)  SpO2: 96% (96% - 100%)  HEENT:  Head is normocephalic    Skin:  Warm and dry without any rash   NECK:  Supple without lymphadenopathy.   HEART:  Regular rate and rhythm. normal S1 and S2, No M/R/G  LUNGS:  Good ins/exp effort, no W/R/R/C  ABDOMEN:  Soft, nontender, nondistended with good bowel sounds heard  EXTREMITIES:  Without cyanosis, clubbing or edema.   NEUROLOGICAL:  Gross nonfocal         CBC Full  -  ( 2017 04:46 )  WBC Count : 7.2 K/uL  Hemoglobin : 11.2 g/dL  Hematocrit : 32.4 %  Platelet Count - Automated : 187 K/uL  Mean Cell Volume : 87.8 fl  Mean Cell Hemoglobin : 30.3 pg  Mean Cell Hemoglobin Concentration : 34.5 gm/dL  Auto Neutrophil # : 5.2 K/uL  Auto Lymphocyte # : 1.0 K/uL  Auto Monocyte # : 0.8 K/uL  Auto Eosinophil # : 0.1 K/uL  Auto Basophil # : 0.1 K/uL  Auto Neutrophil % : 72.6 %  Auto Lymphocyte % : 13.9 %  Auto Monocyte % : 11.3 %  Auto Eosinophil % : 1.5 %  Auto Basophil % : 0.7 %    PT/INR - ( 2017 17:14 )   PT: 11.5 sec;   INR: 1.04 ratio         PTT - ( 2017 17:14 )  PTT:33.0 sec  Urinalysis Basic - ( 2017 19:00 )    Color: Yellow / Appearance: Clear / S.015 / pH: x  Gluc: x / Ketone: Negative  / Bili: Negative / Urobili: Negative mg/dL   Blood: x / Protein: 100 mg/dL / Nitrite: Negative   Leuk Esterase: Negative / RBC: 6-10 /HPF / WBC 0-2   Sq Epi: x / Non Sq Epi: Few / Bacteria: Few      2017 04:46    140    |  105    |  21     ----------------------------<  178    4.2     |  25     |  1.18     Ca    7.7        2017 04:46  Phos  2.5       2017 05:03  Mg     1.9       2017 05:03    TPro  5.6    /  Alb  2.6    /  TBili  0.4    /  DBili  x      /  AST  15     /  ALT  14     /  AlkPhos  73     2017 20:57          *AMS / possible break through seizure  - pending Trileptal levels   - Incomplete EEG/Monitoring  - MRI brain with MRA h/o VA dissection in past. Did not reveal any acute pathology  - ASA  - As per neuro: Trileptal levels are therapeutic will consider adding Vimpat 50 mg twice daily     * hMPV  - supportive care    *Elevated ammonia level  -Normal transaminases   -Possibly 2/2 inborn errors of metabolism?  -Further work up outpatient    *HTN  -Low salt diet  -C/w home meds    *DM2  -Humalog ISS  -Diabetic diet    *Hypothyroidism  -C/w synthroid  -Check TSH    *DVT ppx  -SCDs.
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
## 2017-02-24 ENCOUNTER — TRANSCRIPTION ENCOUNTER (OUTPATIENT)
Age: 72
End: 2017-02-24

## 2017-02-24 VITALS — SYSTOLIC BLOOD PRESSURE: 156 MMHG | HEART RATE: 74 BPM | RESPIRATION RATE: 18 BRPM | DIASTOLIC BLOOD PRESSURE: 85 MMHG

## 2017-02-24 LAB
ANION GAP SERPL CALC-SCNC: 9 MMOL/L — SIGNIFICANT CHANGE UP (ref 5–17)
BASOPHILS # BLD AUTO: 0 K/UL — SIGNIFICANT CHANGE UP (ref 0–0.2)
BASOPHILS NFR BLD AUTO: 0.6 % — SIGNIFICANT CHANGE UP (ref 0–2)
BUN SERPL-MCNC: 20 MG/DL — SIGNIFICANT CHANGE UP (ref 7–23)
CALCIUM SERPL-MCNC: 8.3 MG/DL — LOW (ref 8.5–10.1)
CHLORIDE SERPL-SCNC: 106 MMOL/L — SIGNIFICANT CHANGE UP (ref 96–108)
CO2 SERPL-SCNC: 26 MMOL/L — SIGNIFICANT CHANGE UP (ref 22–31)
CREAT SERPL-MCNC: 1.21 MG/DL — SIGNIFICANT CHANGE UP (ref 0.5–1.3)
EOSINOPHIL # BLD AUTO: 0.3 K/UL — SIGNIFICANT CHANGE UP (ref 0–0.5)
EOSINOPHIL NFR BLD AUTO: 3.8 % — SIGNIFICANT CHANGE UP (ref 0–6)
GLUCOSE SERPL-MCNC: 185 MG/DL — HIGH (ref 70–99)
HCT VFR BLD CALC: 32.9 % — LOW (ref 39–50)
HGB BLD-MCNC: 11.8 G/DL — LOW (ref 13–17)
LYMPHOCYTES # BLD AUTO: 1.4 K/UL — SIGNIFICANT CHANGE UP (ref 1–3.3)
LYMPHOCYTES # BLD AUTO: 20.6 % — SIGNIFICANT CHANGE UP (ref 13–44)
MCHC RBC-ENTMCNC: 31.4 PG — SIGNIFICANT CHANGE UP (ref 27–34)
MCHC RBC-ENTMCNC: 35.8 GM/DL — SIGNIFICANT CHANGE UP (ref 32–36)
MCV RBC AUTO: 87.9 FL — SIGNIFICANT CHANGE UP (ref 80–100)
MONOCYTES # BLD AUTO: 0.6 K/UL — SIGNIFICANT CHANGE UP (ref 0–0.9)
MONOCYTES NFR BLD AUTO: 9.3 % — SIGNIFICANT CHANGE UP (ref 2–14)
NEUTROPHILS # BLD AUTO: 4.6 K/UL — SIGNIFICANT CHANGE UP (ref 1.8–7.4)
NEUTROPHILS NFR BLD AUTO: 65.6 % — SIGNIFICANT CHANGE UP (ref 43–77)
OXCARBAZEPINE SERPL-MCNC: 16 UG/ML — SIGNIFICANT CHANGE UP (ref 10–35)
PLATELET # BLD AUTO: 168 K/UL — SIGNIFICANT CHANGE UP (ref 150–400)
POTASSIUM SERPL-MCNC: 4.7 MMOL/L — SIGNIFICANT CHANGE UP (ref 3.5–5.3)
POTASSIUM SERPL-SCNC: 4.7 MMOL/L — SIGNIFICANT CHANGE UP (ref 3.5–5.3)
RBC # BLD: 3.75 M/UL — LOW (ref 4.2–5.8)
RBC # FLD: 11.4 % — SIGNIFICANT CHANGE UP (ref 10.3–14.5)
SODIUM SERPL-SCNC: 141 MMOL/L — SIGNIFICANT CHANGE UP (ref 135–145)
WBC # BLD: 6.9 K/UL — SIGNIFICANT CHANGE UP (ref 3.8–10.5)
WBC # FLD AUTO: 6.9 K/UL — SIGNIFICANT CHANGE UP (ref 3.8–10.5)

## 2017-02-24 PROCEDURE — 99233 SBSQ HOSP IP/OBS HIGH 50: CPT

## 2017-02-24 RX ORDER — ASPIRIN/CALCIUM CARB/MAGNESIUM 324 MG
1 TABLET ORAL
Qty: 0 | Refills: 0 | COMMUNITY
Start: 2017-02-24

## 2017-02-24 RX ORDER — ASPIRIN/CALCIUM CARB/MAGNESIUM 324 MG
81 TABLET ORAL DAILY
Qty: 0 | Refills: 0 | Status: DISCONTINUED | OUTPATIENT
Start: 2017-02-24 | End: 2017-02-24

## 2017-02-24 RX ORDER — LACOSAMIDE 50 MG/1
1 TABLET ORAL
Qty: 28 | Refills: 0 | OUTPATIENT
Start: 2017-02-24 | End: 2017-03-10

## 2017-02-24 RX ADMIN — Medication 1: at 08:35

## 2017-02-24 RX ADMIN — OXCARBAZEPINE 450 MILLIGRAM(S): 300 TABLET, FILM COATED ORAL at 10:57

## 2017-02-24 RX ADMIN — Medication 81 MILLIGRAM(S): at 17:29

## 2017-02-24 RX ADMIN — Medication 4: at 13:39

## 2017-02-24 RX ADMIN — Medication 5 MILLIGRAM(S): at 05:14

## 2017-02-24 RX ADMIN — Medication 50 MILLIGRAM(S): at 17:29

## 2017-02-24 RX ADMIN — LEVETIRACETAM 1500 MILLIGRAM(S): 250 TABLET, FILM COATED ORAL at 10:57

## 2017-02-24 RX ADMIN — PANTOPRAZOLE SODIUM 40 MILLIGRAM(S): 20 TABLET, DELAYED RELEASE ORAL at 05:13

## 2017-02-24 RX ADMIN — Medication 50 MILLIGRAM(S): at 05:14

## 2017-02-24 NOTE — PROGRESS NOTE ADULT - ASSESSMENT
70 y/o M with a hx of PMHx notable for HTN, hyperlipidemia, vertebral artery dissection DM type II, CKD III, asthma, and seizures (on keppra  & trileptal), sturge lorenzo syndrome, epilepsy, presents to ED BIBA s/p seizure. Being admitted for seizures while on keppra and trileptal.
72 y/o M with a hx of PMHx notable for HTN, hyperlipidemia, vertebral artery dissection DM type II, CKD III, asthma, and seizures (on keppra  & trileptal), sturge lorenzo syndrome, epilepsy, presents to ED BIBA s/p seizure. Being admitted for seizures while on keppra and trileptal.
· Assessment		  IMP : Pt with known Sturge -Quinonez syndrome and  Known Partial complex seizures with Generalization admitted with Recurrent Break through seizures   R/o CNS pathology Metabolic causes  Subtheraputic meds.  Check Trileptal levels. Still pending call me with levels  Incomplete EEG/Monitoring c/w underlying Seizure pathology Pt pulled the monitoring device in 3 hrs.  MRI brain with MRA h/o VA dissection in past. Did not reveal any acute pathology. Continue ASA  Case discussed with  and Pt.  If Trileptal levels are therapeutic will consider adding Vimpat 50 mg twice a day and adjust dose as out Pt.  Will F/u.
· Assessment		  IMP : Pt with known Sturge -Quinonez syndrome and  Known Partial complex seizures with Generalization admitted with Recurrent Break through seizures   R/o CNS pathology Metabolic causes  Subtheraputic meds.  Check Trileptal levels. Still pending call me with levels  Incomplete EEG/Monitoring c/w underlying Seizure pathology Pt pulled the monitoring device in 3 hrs.  MRI brain with MRA h/o VA dissection in past. Did not reveal any acute pathology. Continue ASA  Case discussed with  and Pt.  If Trileptal levels are therapeutic will consider adding Vimpat 50 mg twice a day or Zonisamide 50 mg twice a day if [employer] covers it and adjust dose as out Pt.  Will F/u as out Pt after d/c.
72 y/o M with a hx of PMHx notable for HTN, hyperlipidemia, vertebral artery dissection DM type II, CKD III, asthma, and seizures (on keppra  & trileptal), sturge lorenzo syndrome, epilepsy, presents to ED BIBA s/p seizure. Being admitted for seizures while on keppra and trileptal.

## 2017-02-24 NOTE — PROGRESS NOTE ADULT - PROBLEM SELECTOR PLAN 6
- Check TSH  - Cont Synthroid @ 75 mcg

## 2017-02-24 NOTE — DISCHARGE NOTE ADULT - PLAN OF CARE
seizure control -Continue home medications  -Add Vimapt 50mg twice daily  -Follow up with Dr. Baptiste 3/10 [id number] meds unchanged HbA1C<7 -HbA1C is 8.3.   -Continue home medication. Follow up with family doctor for change in  medications

## 2017-02-24 NOTE — DISCHARGE NOTE ADULT - HOSPITAL COURSE
70 y/o M with a hx of  vertebral artery dissection, epilepsy, presents to ED BIBA s/p seizure.    Trileptal levels check and in range.  Incomplete EEG/Monitoring c/w underlying Seizure pathology Pt pulled the monitoring device in 3 hrs.  MRI brain with MRA h/o VA dissection in past. Did not reveal any acute pathology.     patient to be started on Vimpat on top of his anti epileptic treatment and to follow up with Dr. Baptiste

## 2017-02-24 NOTE — DISCHARGE NOTE ADULT - PATIENT PORTAL LINK FT
“You can access the [facility] Patient Portal, offered by [facility], by registering with the following website: [email or web address]”
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
## 2017-02-24 NOTE — DISCHARGE NOTE ADULT - CARE PROVIDERS DIRECT ADDRESSES
[email or web address],DirectAddress_Unknown,DirectAddress_Unknown

## 2017-02-24 NOTE — DISCHARGE NOTE ADULT - NS AS DC STROKE ED MATERIALS
Need for Followup After Discharge/Risk Factors for Stroke/Prescribed Medications/Stroke Warning Signs and Symptoms/Call 911 for Stroke

## 2017-02-24 NOTE — PROGRESS NOTE ADULT - PROBLEM SELECTOR PLAN 1
# Breakthrough Seizure  - Neuro Consult appreciated  - Cont Keppra and Trileptal  - MRI of brain ordered reviewed  - MRA head and neck reviewed  - Monitor on tele  - Keppra level ordered-pending  - Trileptal level ordered-pending; Will call Dr. Baptiste when results are available. If Trileptal levels are therapeutic will consider adding Vimpat 50 mg twice a day and adjust dose as outpt.  - Fall, Seizure and Aspiration precautions  - Ativan IV PRN for seizure like activity  -Aspirin 81mg ordered
# Breakthrough Seizure  - Neuro Consult appreciated  - Cont Keppra and Trileptal  - MRI of brain ordered-pending  - MRA head and neck ordered-pending.  - Monitor on tele  - Trileptal level ordered-pending  - F/U Keppra level in the am  - Fall, Seizure and Aspiration precautions  - Ativan IV PRN for seizure like activity
# Breakthrough Seizure  - Neuro Consult appreciated  - Cont Keppra and Trileptal  - MRI of brain ordered reviewed  - MRA head and neck reviewed  - Monitor on tele  - Keppra level ordered-pending  - Trileptal level ordered-pending; Will call Dr. Baptiste when results are available. If Trileptal levels are therapeutic will consider adding Vimpat 50 mg twice a day and adjust dose as outpt.  - Fall, Seizure and Aspiration precautions  - Ativan IV PRN for seizure like activity

## 2017-02-24 NOTE — DISCHARGE NOTE ADULT - CARE PLAN
Principal Discharge DX:	Seizure  Goal:	seizure control  Instructions for follow-up, activity and diet:	-Continue home medications  -Add Vimapt 50mg twice daily  -Follow up with Dr. Baptiste 3/10  Secondary Diagnosis:	HTN (hypertension)  Instructions for follow-up, activity and diet:	[id number] meds unchanged  Secondary Diagnosis:	Diabetes  Goal:	HbA1C<7  Instructions for follow-up, activity and diet:	-HbA1C is 8.3.   -Continue home medication. Follow up with family doctor for change in  medications Principal Discharge DX:	Seizure  Goal:	seizure control  Instructions for follow-up, activity and diet:	-Continue home medications  -Add Vimapt 50mg twice daily  -Follow up with Dr. Baptiste 3/10  Secondary Diagnosis:	HTN (hypertension)  Instructions for follow-up, activity and diet:	[id number] meds unchanged  Secondary Diagnosis:	Diabetes  Goal:	HbA1C<7  Instructions for follow-up, activity and diet:	-HbA1C is 8.3.   -Continue home medication. Follow up with family doctor for change in  medications Principal Discharge DX:	Seizure  Goal:	seizure control  Instructions for follow-up, activity and diet:	-Continue home medications  -Add Vimapt 50mg twice daily  -Follow up with Dr. Baptiste 3/10  Secondary Diagnosis:	HTN (hypertension)  Instructions for follow-up, activity and diet:	[id number] meds unchanged  Secondary Diagnosis:	Diabetes  Goal:	HbA1C<7  Instructions for follow-up, activity and diet:	-HbA1C is 8.3.   -Continue home medication. Follow up with family doctor for change in  medications Principal Discharge DX:	Seizure  Goal:	seizure control  Instructions for follow-up, activity and diet:	-Continue home medications  -Add Vimapt 50mg twice daily  -Follow up with Dr. Baptiste 3/10  Secondary Diagnosis:	HTN (hypertension)  Instructions for follow-up, activity and diet:	[id number] meds unchanged  Secondary Diagnosis:	Diabetes  Goal:	HbA1C<7  Instructions for follow-up, activity and diet:	-HbA1C is 8.3.   -Continue home medication. Follow up with family doctor for change in  medications

## 2017-02-24 NOTE — PROGRESS NOTE ADULT - PROBLEM SELECTOR PLAN 4
- ISS  - Diabetic diet  - Check A1C

## 2017-02-24 NOTE — DISCHARGE NOTE ADULT - CARE PROVIDER_API CALL
Juliet Baptiste), Neurology  General  [address] [place] [id number]  Phone: [phone]  Fax: [phone]    Co, Marlon OLIVERA), Internal Medicine; Pulmonary Disease  [address] [place] [address]  Phone: [phone]  Fax: [phone]

## 2017-02-24 NOTE — PROGRESS NOTE ADULT - SUBJECTIVE AND OBJECTIVE BOX
History of Present Illness: 	    70 y/o M with a hx of PMHx notable for HTN, hyperlipidemia, vertebral artery dissection DM type II, CKD III, asthma, and seizures (on keppra  & trileptal), sturge lorenzo syndrome, epilepsy, presents to ED BIBA s/p seizure.  Pt states he does not have any complaints at this time. Pt states he has had intermittent difficulty with coordination and "not feeling totally in control of himself." Pt lives at home alone.    Pt states for the past 2 weeks he hasn't felt well, he felt that something is not right, he became more forgetful and misplaced more things. 3 days ago pt states he had a seizure that he was unaware of , his friend at the [facility] told him about the seizure. Pt also reports for the past 3 months he has been noticing bite marks on the side of his tongue, also had double vision, was supposed to see a ophthalmologist but never followed up. Pt stated today he went to [employer] to eat as he goes there regularly Pt states he is unaware of anything that happened at [facility] but the manager called 911 as pt did not look well. Denies any CP, NVD, HA, confusion, Bowel or Bladder incontinence. Pt states he takes his medications on time and does not remember the last time he had a seizure, states 3 days ago he is sure he had a seizure also states he suspects he is having seizures at home that he does not realize as he does not have post ictal confusion, loss of bowel or bladder function.    : seen and eval. hemodynamically stable. Denies any HA, CP, SOB. (+) hMPV, patient forgetful to what happened with EEG yesterday (pulled off). Stating (R) arm shaking and (L) leg twitching lasted only couple of sec. Labs and vitals reviewed.      : seen and eval. comfortably resting. episode of seizures yesterday. Denies any HA, CP, SOB. No fevers, chills or shakes. goals of care discussed with patient.     REVIEW OF SYSTEMS:  General: NAD, hemodynamically stable, (-)  fever, (-) chills, (-) weakness  HEENT:  Eyes:  No visual loss, blurred vision, double vision or yellow sclerae. Ears, Nose, Throat:  No hearing loss, sneezing, congestion, runny nose or sore throat.  SKIN:  No rash or itching.  CARDIOVASCULAR:  No chest pain, chest pressure or chest discomfort. No palpitations or edema.  RESPIRATORY:  No shortness of breath, cough or sputum.  GASTROINTESTINAL:  No anorexia, nausea, vomiting or diarrhea. No abdominal pain or blood.  NEUROLOGICAL:  No headache, dizziness, syncope, paralysis, ataxia, numbness or tingling in the extremities. No change in bowel or bladder control.  MUSCULOSKELETAL:  No muscle, back pain, joint pain or stiffness.  HEMATOLOGIC:  No anemia, bleeding or bruising.  LYMPHATICS:  No enlarged nodes. No history of splenectomy.  ENDOCRINOLOGIC:  No reports of sweating, cold or heat intolerance. No polyuria or polydipsia.  ALLERGIES:  No history of asthma, hives, eczema or rhinitis.    Physical Exam:   GENERAL APPEARANCE:  NAD, hemodynamically stable  T(C): 37.8, Max: 37.8 (02-22 @ 10:10)  HR: 75 (65 - 81)  BP: 148/67 (141/76 - 197/93)  RR: 16 (13 - 22)  SpO2: 96% (96% - 100%)  HEENT:  Head is normocephalic    Skin:  Warm and dry without any rash   NECK:  Supple without lymphadenopathy.   HEART:  Regular rate and rhythm. normal S1 and S2, No M/R/G  LUNGS:  Good ins/exp effort, no W/R/R/C  ABDOMEN:  Soft, nontender, nondistended with good bowel sounds heard  EXTREMITIES:  Without cyanosis, clubbing or edema.   NEUROLOGICAL:  Gross nonfocal         CBC Full  -  ( 2017 04:46 )  WBC Count : 7.2 K/uL  Hemoglobin : 11.2 g/dL  Hematocrit : 32.4 %  Platelet Count - Automated : 187 K/uL  Mean Cell Volume : 87.8 fl  Mean Cell Hemoglobin : 30.3 pg  Mean Cell Hemoglobin Concentration : 34.5 gm/dL  Auto Neutrophil # : 5.2 K/uL  Auto Lymphocyte # : 1.0 K/uL  Auto Monocyte # : 0.8 K/uL  Auto Eosinophil # : 0.1 K/uL  Auto Basophil # : 0.1 K/uL  Auto Neutrophil % : 72.6 %  Auto Lymphocyte % : 13.9 %  Auto Monocyte % : 11.3 %  Auto Eosinophil % : 1.5 %  Auto Basophil % : 0.7 %    PT/INR - ( 2017 17:14 )   PT: 11.5 sec;   INR: 1.04 ratio         PTT - ( 2017 17:14 )  PTT:33.0 sec  Urinalysis Basic - ( 2017 19:00 )    Color: Yellow / Appearance: Clear / S.015 / pH: x  Gluc: x / Ketone: Negative  / Bili: Negative / Urobili: Negative mg/dL   Blood: x / Protein: 100 mg/dL / Nitrite: Negative   Leuk Esterase: Negative / RBC: 6-10 /HPF / WBC 0-2   Sq Epi: x / Non Sq Epi: Few / Bacteria: Few      2017 04:46    140    |  105    |  21     ----------------------------<  178    4.2     |  25     |  1.18     Ca    7.7        2017 04:46  Phos  2.5       2017 05:03  Mg     1.9       2017 05:03    TPro  5.6    /  Alb  2.6    /  TBili  0.4    /  DBili  x      /  AST  15     /  ALT  14     /  AlkPhos  73     2017 20:57          *AMS / possible break through seizure  - pending Trileptal levels   - Incomplete EEG/Monitoring  - MRI brain with MRA h/o VA dissection in past. Did not reveal any acute pathology  - ASA  - As per neuro: Trileptal levels are therapeutic will consider adding Vimpat 50 mg twice daily     * hMPV  - supportive care    *Elevated ammonia level  -Normal transaminases   -Possibly 2/2 inborn errors of metabolism?  -Further work up outpatient    *HTN  -Low salt diet  -C/w home meds    *DM2  -Humalog ISS  -Diabetic diet    *Hypothyroidism  -C/w synthroid  -Check TSH    *DVT ppx  -SCDs. History of Present Illness: 	    72 y/o M with a hx of PMHx notable for HTN, hyperlipidemia, vertebral artery dissection DM type II, CKD III, asthma, and seizures (on keppra  & trileptal), sturge lorenzo syndrome, epilepsy, presents to ED BIBA s/p seizure.  Pt states he does not have any complaints at this time. Pt states he has had intermittent difficulty with coordination and "not feeling totally in control of himself." Pt lives at home alone.    Pt states for the past 2 weeks he hasn't felt well, he felt that something is not right, he became more forgetful and misplaced more things. 3 days ago pt states he had a seizure that he was unaware of , his friend at the [facility] told him about the seizure. Pt also reports for the past 3 months he has been noticing bite marks on the side of his tongue, also had double vision, was supposed to see a ophthalmologist but never followed up. Pt stated today he went to [employer] to eat as he goes there regularly Pt states he is unaware of anything that happened at [employer] but the manager called 911 as pt did not look well. Denies any CP, NVD, HA, confusion, Bowel or Bladder incontinence. Pt states he takes his medications on time and does not remember the last time he had a seizure, states 3 days ago he is sure he had a seizure also states he suspects he is having seizures at home that he does not realize as he does not have post ictal confusion, loss of bowel or bladder function.    : seen and eval. hemodynamically stable. Denies any HA, CP, SOB. (+) hMPV, patient forgetful to what happened with EEG yesterday (pulled off). Stating (R) arm shaking and (L) leg twitching lasted only couple of sec. Labs and vitals reviewed.      : seen and eval. comfortably resting. episode of seizures yesterday. Denies any HA, CP, SOB. No fevers, chills or shakes. goals of care discussed with patient. No further fevers today. Trilepta levels 16 - Call made out to .       Physical Exam:   GENERAL APPEARANCE:  NAD, hemodynamically stable  T(C): 37.8, Max: 37.8 ( @ 10:10)  HR: 75 (65 - 81)  BP: 148/67 (141/76 - 197/93)  RR: 16 (13 - 22)  SpO2: 96% (96% - 100%)  HEENT:  Head is normocephalic    Skin:  Warm and dry without any rash   NECK:  Supple without lymphadenopathy.   HEART:  Regular rate and rhythm. normal S1 and S2, No M/R/G  LUNGS:  Good ins/exp effort, no W/R/R/C    CBC Full  -  ( 2017 06:33 )  WBC Count : 6.9 K/uL  Hemoglobin : 11.8 g/dL  Hematocrit : 32.9 %  Platelet Count - Automated : 168 K/uL    Urinalysis Basic - ( 2017 19:00 )    Color: Yellow / Appearance: Clear / S.015 / pH: x  Gluc: x / Ketone: Negative  / Bili: Negative / Urobili: Negative mg/dL   Blood: x / Protein: 100 mg/dL / Nitrite: Negative   Leuk Esterase: Negative / RBC: 6-10 /HPF / WBC 0-2   Sq Epi: x / Non Sq Epi: Few / Bacteria: Few      2017 06:33    141    |  106    |  20     ----------------------------<  185    4.7     |  26     |  1.21     Ca    8.3        2017 06:33    TPro  5.6    /  Alb  2.6    /  TBili  0.4    /  DBili  x      /  AST  15     /  ALT  14     /  AlkPhos  73     2017 20:57      *AMS / possible break through seizure  - Trileptal level 16  - Incomplete EEG/Monitoring  - MRI brain with MRA h/o VA dissection in past. Did not reveal any acute pathology  - ASA  - As per neuro: Trileptal levels are therapeutic will consider adding Vimpat 50 mg twice daily     * hMPV  - supportive care    *Elevated ammonia level  -Normal transaminases   -Possibly 2/2 inborn errors of metabolism?  -Further work up outpatient    *HTN  -Low salt diet  -C/w home meds    *DM2  -Humalog ISS  -Diabetic diet    *Hypothyroidism  -C/w synthroid  -Check TSH History of Present Illness: 	    70 y/o M with a hx of PMHx notable for HTN, hyperlipidemia, vertebral artery dissection DM type II, CKD III, asthma, and seizures (on keppra  & trileptal), sturge lorenzo syndrome, epilepsy, presents to ED BIBA s/p seizure.  Pt states he does not have any complaints at this time. Pt states he has had intermittent difficulty with coordination and "not feeling totally in control of himself." Pt lives at home alone.    Pt states for the past 2 weeks he hasn't felt well, he felt that something is not right, he became more forgetful and misplaced more things. 3 days ago pt states he had a seizure that he was unaware of , his friend at the [facility] told him about the seizure. Pt also reports for the past 3 months he has been noticing bite marks on the side of his tongue, also had double vision, was supposed to see a ophthalmologist but never followed up. Pt stated today he went to [employer] to eat as he goes there regularly Pt states he is unaware of anything that happened at [facility] but the manager called 911 as pt did not look well. Denies any CP, NVD, HA, confusion, Bowel or Bladder incontinence. Pt states he takes his medications on time and does not remember the last time he had a seizure, states 3 days ago he is sure he had a seizure also states he suspects he is having seizures at home that he does not realize as he does not have post ictal confusion, loss of bowel or bladder function.    : seen and eval. hemodynamically stable. Denies any HA, CP, SOB. (+) hMPV, patient forgetful to what happened with EEG yesterday (pulled off). Stating (R) arm shaking and (L) leg twitching lasted only couple of sec. Labs and vitals reviewed.      : seen and eval. comfortably resting. episode of seizures yesterday. Denies any HA, CP, SOB. No fevers, chills or shakes. goals of care discussed with patient. No further fevers today. Trilepta levels 16 - Call made out to .     Care thoroughly discussed with Neurology. D/C planning with follow up .       Physical Exam:   GENERAL APPEARANCE:  NAD, hemodynamically stable  T(C): 37.8, Max: 37.8 (-22 @ 10:10)  HR: 75 (65 - 81)  BP: 148/67 (141/76 - 197/93)  RR: 16 (13 - 22)  SpO2: 96% (96% - 100%)  HEENT:  Head is normocephalic    Skin:  Warm and dry without any rash   NECK:  Supple without lymphadenopathy.   HEART:  Regular rate and rhythm. normal S1 and S2, No M/R/G  LUNGS:  Good ins/exp effort, no W/R/R/C    CBC Full  -  ( 2017 06:33 )  WBC Count : 6.9 K/uL  Hemoglobin : 11.8 g/dL  Hematocrit : 32.9 %  Platelet Count - Automated : 168 K/uL    Urinalysis Basic - ( 2017 19:00 )    Color: Yellow / Appearance: Clear / S.015 / pH: x  Gluc: x / Ketone: Negative  / Bili: Negative / Urobili: Negative mg/dL   Blood: x / Protein: 100 mg/dL / Nitrite: Negative   Leuk Esterase: Negative / RBC: 6-10 /HPF / WBC 0-2   Sq Epi: x / Non Sq Epi: Few / Bacteria: Few      2017 06:33    141    |  106    |  20     ----------------------------<  185    4.7     |  26     |  1.21     Ca    8.3        2017 06:33    TPro  5.6    /  Alb  2.6    /  TBili  0.4    /  DBili  x      /  AST  15     /  ALT  14     /  AlkPhos  73     2017 20:57      *AMS / possible break through seizure  - Trileptal level 16  - Incomplete EEG/Monitoring  - MRI brain with MRA h/o VA dissection in past. Did not reveal any acute pathology  - ASA  - As per neuro: Trileptal levels are therapeutic will consider adding Vimpat 50 mg twice daily     * hMPV  - supportive care    *Elevated ammonia level  -Normal transaminases   -Possibly 2/2 inborn errors of metabolism?  -Further work up outpatient    *HTN  -Low salt diet  -C/w home meds    *DM2  -Humalog ISS  -Diabetic diet    *Hypothyroidism  -C/w synthroid  -Check TSH

## 2017-02-24 NOTE — PROGRESS NOTE ADULT - SUBJECTIVE AND OBJECTIVE BOX
CHIEF COMPLAINT: Seizure    SUBJECTIVE: 72 y/o M with a hx of  vertebral artery dissection, epilepsy, presents to ED BIBA s/p seizure.  Pt states he does not have any complaints at this time. Pt states he has had intermittent difficulty with coordination and "not feeling totally in control of himself." Pt lives at home alone.    Pt states for the past 2 weeks he hasn't felt well, he felt that something is not right, he became more forgetful and misplaced more things. 3 days ago pt states he had a seizure that he was unaware of , his friend at the [facility] told him about the seizure. Pt also reports for the past 3 months he has been noticing bite marks on the side of his tongue, also had double vision, was supposed to see a ophthalmologist but never followed up. Pt stated today he went to [facility] to eat as he goes there regularly Pt states he is unaware of anything that happened at [facility] but the manager called 911 as pt did not look well. Denies any CP, NVD, HA, confusion, Bowel or Bladder incontinence. Pt states he takes his medications on time and does not remember the last time he had a seizure, states 3 days ago he is sure he had a seizure also states he suspects he is having seizures at home that he does not realize as he does not have post ictal confusion, loss of bowel or bladder function.    2/22: Pt is seen and examined with attending Dr. Crowe. Pt is resting comfortably and in NAD. Pt denies any f/c/n/v/sob/headache/chest pain/abdominal pain.    2/23: Pt is seen and examined with attending Dr. Crowe. Pt is in isolation due to positive hMPV. Pt is resting comfortably and in NAD. Pt pulled leads off yesterday while 24hr EEG was being performed and pt states he does not remember removing the leads. EEG tech was present and reported pt was confused and his right arm shaking and left leg twitching independently lasting few secs.  Pt denies any f/c/n/v/sob/headache/chest pain/abdominal pain currently.    2/24:  Pt is seen and examined with attending Dr. Crowe. Pt is in isolation due to positive hMPV. Pt is resting comfortably and in NAD. Pt denies any f/c/n/v/sob/headache/chest pain/abdominal pain currently.    REVIEW OF SYSTEMS:    CONSTITUTIONAL: No weakness, fevers or chills  EYES/ENT: No visual changes;  No vertigo or throat pain   NECK: No pain or stiffness  RESPIRATORY: No cough, wheezing, hemoptysis; No shortness of breath  CARDIOVASCULAR: No chest pain or palpitations  GASTROINTESTINAL: No abdominal or epigastric pain. No nausea, vomiting, or hematemesis; No diarrhea or constipation. No melena or hematochezia.  GENITOURINARY: No dysuria, frequency or hematuria  NEUROLOGICAL: No numbness or weakness  SKIN: No itching, burning, rashes, or lesions   All other review of systems is negative unless indicated above    Vital Signs Last 24 Hrs  T(C): 37.4, Max: 37.4 (02-24 @ 10:19)  T(F): 99.3, Max: 99.3 (02-24 @ 10:19)  HR: 74 (70 - 83)  BP: 137/68 (117/55 - 176/82)  BP(mean): 86 (70 - 105)  RR: 18 (14 - 20)  SpO2: 95% (94% - 98%)    I&O's Summary    I & Os for current day (as of 24 Feb 2017 14:54)  =============================================  IN: 480 ml / OUT: 0 ml / NET: 480 ml      CAPILLARY BLOOD GLUCOSE  310 (24 Feb 2017 12:25)  198 (24 Feb 2017 08:09)  211 (23 Feb 2017 16:55)      PHYSICAL EXAM:    Constitutional: NAD, awake and alert, well-developed  HEENT: PERR, EOMI, Normal Hearing, MMM  Neck: Soft and supple, No LAD, No JVD  Respiratory: Breath sounds are clear bilaterally, No wheezing, rales or rhonchi  Cardiovascular: S1 and S2, regular rate and rhythm, no Murmurs, gallops or rubs  Gastrointestinal: Bowel Sounds present, soft, nontender, nondistended, no guarding, no rebound  Extremities: No peripheral edema  Vascular: 2+ peripheral pulses  Neurological: A/O x 3, no focal deficits  Musculoskeletal: 5/5 strength b/l upper and lower extremities  Skin: No rashes    MEDICATIONS:  MEDICATIONS  (STANDING):  enalapril 5milliGRAM(s) Oral daily  levETIRAcetam 1500milliGRAM(s) Oral two times a day  OXcarbazepine 450milliGRAM(s) Oral two times a day  atorvastatin 20milliGRAM(s) Oral at bedtime  ALPRAZolam 0.25milliGRAM(s) Oral at bedtime  metoprolol 50milliGRAM(s) Oral two times a day  pantoprazole    Tablet 40milliGRAM(s) Oral before breakfast  levothyroxine 75MICROGram(s) Oral at bedtime  insulin lispro (HumaLOG) corrective regimen sliding scale  SubCutaneous three times a day before meals  insulin lispro (HumaLOG) corrective regimen sliding scale  SubCutaneous at bedtime  dextrose 5%. 1000milliLiter(s) IV Continuous <Continuous>  dextrose 50% Injectable 12.5Gram(s) IV Push once  dextrose 50% Injectable 25Gram(s) IV Push once  dextrose 50% Injectable 25Gram(s) IV Push once      LABS: All Labs Reviewed:                        11.8   6.9   )-----------( 168      ( 24 Feb 2017 06:33 )             32.9     24 Feb 2017 06:33    141    |  106    |  20     ----------------------------<  185    4.7     |  26     |  1.21     Ca    8.3        24 Feb 2017 06:33    TPro  5.6    /  Alb  2.6    /  TBili  0.4    /  DBili  x      /  AST  15     /  ALT  14     /  AlkPhos  73     22 Feb 2017 20:57          Blood Culture: 02-22 @ 20:58  Organism --  Gram Stain Blood -- Gram Stain --  Specimen Source .Blood None  Culture-Blood --    02-22 @ 20:57  Organism --  Gram Stain Blood -- Gram Stain --  Specimen Source .Blood None  Culture-Blood --    02-21 @ 21:02  Organism --  Gram Stain Blood -- Gram Stain --  Specimen Source .Urine None  Culture-Blood --        RADIOLOGY/EKG:  MRI brain: No acute infarct or other acute abnormality. Mild volume loss   of the left cerebrum, as seen on prior exam. Susceptibility signal within   the left posterior temporal lobe, compatible with known gyriform   calcifications, unchanged since prior exam.     MRA brain: No hemodynamically significant stenosis.    MRA neck:  Normal antegrade flow in the bilateral common carotid, and   cervical internal carotid. Left cervical vertebral artery is dominant and   appears unremarkable. 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   There is loss of signal of the distal cervical right vertebral artery,   likely compatible with stenosis.    DVT PPX:  Heparin 5000u subcut q12h    DISPOSITION: Pending Trileptal levels

## 2017-02-24 NOTE — DISCHARGE NOTE ADULT - MEDICATION SUMMARY - MEDICATIONS TO TAKE
I will START or STAY ON the medications listed below when I get home from the hospital:    aspirin 81 mg oral tablet, chewable  -- 1 tab(s) by mouth once a day  -- Indication: For Vertebral artery dissection    enalapril 5 mg oral tablet  -- 1 tab(s) by mouth once a day  -- Indication: For HTN (hypertension)    levETIRAcetam 750 mg oral tablet  -- 2 tab(s) by mouth 2 times a day  -- Indication: For Seizure    Vimpat 50 mg oral tablet  -- 1 tab(s) by mouth 2 times a day MDD:2  -- Caution federal law prohibits the transfer of this drug to any person other  than the person for whom it was prescribed.  It is very important that you take or use this exactly as directed.  Do not skip doses or discontinue unless directed by your doctor.  May cause drowsiness or dizziness.  Obtain medical advice before taking any non-prescription drugs as some may affect the action of this medication.  This drug may impair the ability to drive or operate machinery.  Use care until you become familiar with its effects.    -- Indication: For Seizure    Trileptal 150 mg oral tablet  -- 3 tab(s) by mouth 2 times a day  -- Indication: For Seizure    GlipiZIDE XL 5 mg oral tablet, extended release  -- 1 tab(s) by mouth 2 times a day  -- Indication: For Diabetes    Lipitor 20 mg oral tablet  -- 1 tab(s) by mouth once a day (at bedtime)  -- Indication: For HLD (hyperlipidemia)    ALPRAZolam 0.25 mg oral tablet  -- 1 tab(s) by mouth once a day  -- Indication: For Prophylactic measure    metoprolol tartrate 50 mg oral tablet  -- 1 tab(s) by mouth 2 times a day  -- Indication: For HTN (hypertension)    omeprazole 20 mg oral delayed release capsule  -- 1 cap(s) by mouth once a day  -- Indication: For GERD    levothyroxine 75 mcg (0.075 mg) oral tablet  -- 1 tab(s) by mouth once (at bedtime)  -- Indication: For Hypothyroidism, unspecified type I will START or STAY ON the medications listed below when I get home from the hospital:    aspirin 81 mg oral tablet, chewable  -- 1 tab(s) by mouth once a day  -- Indication: For Vertebral artery dissection    enalapril 5 mg oral tablet  -- 1 tab(s) by mouth once a day  -- Indication: For HTN (hypertension)    levETIRAcetam 750 mg oral tablet  -- 2 tab(s) by mouth 2 times a day  -- Indication: For Seizure    Vimpat 50 mg oral tablet  -- 1 tab(s) by mouth 2 times a day MDD:2  -- Caution federal law prohibits the transfer of this drug to any person other  than the person for whom it was prescribed.  It is very important that you take or use this exactly as directed.  Do not skip doses or discontinue unless directed by your doctor.  May cause drowsiness or dizziness.  Obtain medical advice before taking any non-prescription drugs as some may affect the action of this medication.  This drug may impair the ability to drive or operate machinery.  Use care until you become familiar with its effects.    -- Indication: For Seizure    Trileptal 150 mg oral tablet  -- 3 tab(s) by mouth 2 times a day  -- Indication: For Seizure    GlipiZIDE XL 5 mg oral tablet, extended release  -- 1 tab(s) by mouth 2 times a day  -- Indication: For Diabetes    Lipitor 20 mg oral tablet  -- 1 tab(s) by mouth once a day (at bedtime)  -- Indication: For Diabetes    ALPRAZolam 0.25 mg oral tablet  -- 1 tab(s) by mouth once a day  -- Indication: For Seizure    metoprolol tartrate 50 mg oral tablet  -- 1 tab(s) by mouth 2 times a day  -- Indication: For HTN (hypertension)    omeprazole 20 mg oral delayed release capsule  -- 1 cap(s) by mouth once a day  -- Indication: For GERD    levothyroxine 75 mcg (0.075 mg) oral tablet  -- 1 tab(s) by mouth once (at bedtime)  -- Indication: For Hypothyroidism, unspecified type

## 2017-02-28 DIAGNOSIS — E78.5 HYPERLIPIDEMIA, UNSPECIFIED: ICD-10-CM

## 2017-02-28 DIAGNOSIS — R56.9 UNSPECIFIED CONVULSIONS: ICD-10-CM

## 2017-02-28 DIAGNOSIS — G40.909 EPILEPSY, UNSPECIFIED, NOT INTRACTABLE, WITHOUT STATUS EPILEPTICUS: ICD-10-CM

## 2017-02-28 DIAGNOSIS — Q85.8 OTHER PHAKOMATOSES, NOT ELSEWHERE CLASSIFIED: ICD-10-CM

## 2017-02-28 DIAGNOSIS — J45.909 UNSPECIFIED ASTHMA, UNCOMPLICATED: ICD-10-CM

## 2017-02-28 DIAGNOSIS — E72.20 DISORDER OF UREA CYCLE METABOLISM, UNSPECIFIED: ICD-10-CM

## 2017-02-28 DIAGNOSIS — I12.9 HYPERTENSIVE CHRONIC KIDNEY DISEASE WITH STAGE 1 THROUGH STAGE 4 CHRONIC KIDNEY DISEASE, OR UNSPECIFIED CHRONIC KIDNEY DISEASE: ICD-10-CM

## 2017-02-28 DIAGNOSIS — E03.9 HYPOTHYROIDISM, UNSPECIFIED: ICD-10-CM

## 2017-02-28 DIAGNOSIS — E11.9 TYPE 2 DIABETES MELLITUS WITHOUT COMPLICATIONS: ICD-10-CM

## 2017-02-28 DIAGNOSIS — N18.3 CHRONIC KIDNEY DISEASE, STAGE 3 (MODERATE): ICD-10-CM

## 2017-02-28 DIAGNOSIS — H53.2 DIPLOPIA: ICD-10-CM

## 2017-02-28 LAB
CULTURE RESULTS: SIGNIFICANT CHANGE UP
CULTURE RESULTS: SIGNIFICANT CHANGE UP
SPECIMEN SOURCE: SIGNIFICANT CHANGE UP
SPECIMEN SOURCE: SIGNIFICANT CHANGE UP

## 2017-03-10 ENCOUNTER — APPOINTMENT (OUTPATIENT)
Dept: NEUROLOGY | Facility: CLINIC | Age: 72
End: 2017-03-10

## 2017-03-10 VITALS
WEIGHT: 200 LBS | BODY MASS INDEX: 28.63 KG/M2 | HEART RATE: 70 BPM | DIASTOLIC BLOOD PRESSURE: 70 MMHG | HEIGHT: 70 IN | SYSTOLIC BLOOD PRESSURE: 140 MMHG

## 2017-03-10 DIAGNOSIS — Q82.5 CONGENITAL NON-NEOPLASTIC NEVUS: ICD-10-CM

## 2017-05-22 ENCOUNTER — LABORATORY RESULT (OUTPATIENT)
Age: 72
End: 2017-05-22

## 2017-07-17 ENCOUNTER — APPOINTMENT (OUTPATIENT)
Dept: NEUROLOGY | Facility: CLINIC | Age: 72
End: 2017-07-17

## 2017-07-17 VITALS
HEART RATE: 64 BPM | BODY MASS INDEX: 28.63 KG/M2 | SYSTOLIC BLOOD PRESSURE: 146 MMHG | DIASTOLIC BLOOD PRESSURE: 80 MMHG | HEIGHT: 70 IN | WEIGHT: 200 LBS

## 2017-07-19 ENCOUNTER — APPOINTMENT (OUTPATIENT)
Dept: MRI IMAGING | Facility: CLINIC | Age: 72
End: 2017-07-19

## 2017-07-19 ENCOUNTER — OUTPATIENT (OUTPATIENT)
Dept: OUTPATIENT SERVICES | Facility: HOSPITAL | Age: 72
LOS: 1 days | End: 2017-07-19
Payer: MEDICARE

## 2017-07-19 DIAGNOSIS — Z98.89 OTHER SPECIFIED POSTPROCEDURAL STATES: Chronic | ICD-10-CM

## 2017-07-19 DIAGNOSIS — Z00.8 ENCOUNTER FOR OTHER GENERAL EXAMINATION: ICD-10-CM

## 2017-07-19 PROCEDURE — 70551 MRI BRAIN STEM W/O DYE: CPT

## 2017-07-21 ENCOUNTER — APPOINTMENT (OUTPATIENT)
Dept: DERMATOLOGY | Facility: CLINIC | Age: 72
End: 2017-07-21

## 2017-07-21 VITALS — BODY MASS INDEX: 27.3 KG/M2 | HEIGHT: 71 IN | WEIGHT: 195 LBS

## 2017-08-07 ENCOUNTER — INPATIENT (INPATIENT)
Facility: HOSPITAL | Age: 72
LOS: 2 days | Discharge: NURSING, REHAB OR LONG-TERM CARE | End: 2017-08-10
Attending: NEUROLOGICAL SURGERY | Admitting: SURGERY
Payer: MEDICARE

## 2017-08-07 VITALS
HEIGHT: 68 IN | WEIGHT: 199.96 LBS | DIASTOLIC BLOOD PRESSURE: 122 MMHG | SYSTOLIC BLOOD PRESSURE: 146 MMHG | TEMPERATURE: 98 F | OXYGEN SATURATION: 96 % | HEART RATE: 70 BPM | RESPIRATION RATE: 20 BRPM

## 2017-08-07 DIAGNOSIS — Z98.89 OTHER SPECIFIED POSTPROCEDURAL STATES: Chronic | ICD-10-CM

## 2017-08-07 DIAGNOSIS — S14.129A CENTRAL CORD SYNDROME AT UNSPECIFIED LEVEL OF CERVICAL SPINAL CORD, INITIAL ENCOUNTER: ICD-10-CM

## 2017-08-07 LAB
ADD ON TEST-SPECIMEN IN LAB: SIGNIFICANT CHANGE UP
ALBUMIN SERPL ELPH-MCNC: 3.1 G/DL — LOW (ref 3.3–5)
ALP SERPL-CCNC: 91 U/L — SIGNIFICANT CHANGE UP (ref 40–120)
ALT FLD-CCNC: 26 U/L — SIGNIFICANT CHANGE UP (ref 12–78)
AMPHET UR-MCNC: NEGATIVE — SIGNIFICANT CHANGE UP
ANION GAP SERPL CALC-SCNC: 7 MMOL/L — SIGNIFICANT CHANGE UP (ref 5–17)
ANION GAP SERPL CALC-SCNC: 8 MMOL/L — SIGNIFICANT CHANGE UP (ref 5–17)
APPEARANCE UR: CLEAR — SIGNIFICANT CHANGE UP
APTT BLD: 31 SEC — SIGNIFICANT CHANGE UP (ref 27.5–37.4)
AST SERPL-CCNC: 26 U/L — SIGNIFICANT CHANGE UP (ref 15–37)
BACTERIA # UR AUTO: (no result)
BARBITURATES UR SCN-MCNC: NEGATIVE — SIGNIFICANT CHANGE UP
BASOPHILS # BLD AUTO: 0.1 K/UL — SIGNIFICANT CHANGE UP (ref 0–0.2)
BASOPHILS NFR BLD AUTO: 1.1 % — SIGNIFICANT CHANGE UP (ref 0–2)
BENZODIAZ UR-MCNC: NEGATIVE — SIGNIFICANT CHANGE UP
BILIRUB SERPL-MCNC: 0.5 MG/DL — SIGNIFICANT CHANGE UP (ref 0.2–1.2)
BILIRUB UR-MCNC: NEGATIVE — SIGNIFICANT CHANGE UP
BUN SERPL-MCNC: 27 MG/DL — HIGH (ref 7–23)
BUN SERPL-MCNC: 27 MG/DL — HIGH (ref 7–23)
CALCIUM SERPL-MCNC: 8.7 MG/DL — SIGNIFICANT CHANGE UP (ref 8.5–10.1)
CALCIUM SERPL-MCNC: 8.9 MG/DL — SIGNIFICANT CHANGE UP (ref 8.5–10.1)
CHLORIDE SERPL-SCNC: 102 MMOL/L — SIGNIFICANT CHANGE UP (ref 96–108)
CHLORIDE SERPL-SCNC: 103 MMOL/L — SIGNIFICANT CHANGE UP (ref 96–108)
CO2 SERPL-SCNC: 26 MMOL/L — SIGNIFICANT CHANGE UP (ref 22–31)
CO2 SERPL-SCNC: 27 MMOL/L — SIGNIFICANT CHANGE UP (ref 22–31)
COCAINE METAB.OTHER UR-MCNC: NEGATIVE — SIGNIFICANT CHANGE UP
COLOR SPEC: YELLOW — SIGNIFICANT CHANGE UP
CREAT SERPL-MCNC: 1.48 MG/DL — HIGH (ref 0.5–1.3)
CREAT SERPL-MCNC: 1.66 MG/DL — HIGH (ref 0.5–1.3)
DIFF PNL FLD: (no result)
EOSINOPHIL # BLD AUTO: 0.4 K/UL — SIGNIFICANT CHANGE UP (ref 0–0.5)
EOSINOPHIL NFR BLD AUTO: 5.7 % — SIGNIFICANT CHANGE UP (ref 0–6)
EPI CELLS # UR: SIGNIFICANT CHANGE UP
ETHANOL SERPL-MCNC: <10 MG/DL — SIGNIFICANT CHANGE UP (ref 0–10)
GLUCOSE SERPL-MCNC: 291 MG/DL — HIGH (ref 70–99)
GLUCOSE SERPL-MCNC: 357 MG/DL — HIGH (ref 70–99)
GLUCOSE UR QL: 1000 MG/DL
HCT VFR BLD CALC: 38.2 % — LOW (ref 39–50)
HGB BLD-MCNC: 13.6 G/DL — SIGNIFICANT CHANGE UP (ref 13–17)
INR BLD: 1.03 RATIO — SIGNIFICANT CHANGE UP (ref 0.88–1.16)
KETONES UR-MCNC: NEGATIVE — SIGNIFICANT CHANGE UP
LEUKOCYTE ESTERASE UR-ACNC: NEGATIVE — SIGNIFICANT CHANGE UP
LYMPHOCYTES # BLD AUTO: 1.2 K/UL — SIGNIFICANT CHANGE UP (ref 1–3.3)
LYMPHOCYTES # BLD AUTO: 17.5 % — SIGNIFICANT CHANGE UP (ref 13–44)
MCHC RBC-ENTMCNC: 30.8 PG — SIGNIFICANT CHANGE UP (ref 27–34)
MCHC RBC-ENTMCNC: 35.7 GM/DL — SIGNIFICANT CHANGE UP (ref 32–36)
MCV RBC AUTO: 86.2 FL — SIGNIFICANT CHANGE UP (ref 80–100)
METHADONE UR-MCNC: NEGATIVE — SIGNIFICANT CHANGE UP
MONOCYTES # BLD AUTO: 0.5 K/UL — SIGNIFICANT CHANGE UP (ref 0–0.9)
MONOCYTES NFR BLD AUTO: 7.2 % — SIGNIFICANT CHANGE UP (ref 2–14)
NEUTROPHILS # BLD AUTO: 4.8 K/UL — SIGNIFICANT CHANGE UP (ref 1.8–7.4)
NEUTROPHILS NFR BLD AUTO: 68.5 % — SIGNIFICANT CHANGE UP (ref 43–77)
NITRITE UR-MCNC: NEGATIVE — SIGNIFICANT CHANGE UP
OPIATES UR-MCNC: POSITIVE — SIGNIFICANT CHANGE UP
PCP SPEC-MCNC: SIGNIFICANT CHANGE UP
PCP UR-MCNC: NEGATIVE — SIGNIFICANT CHANGE UP
PH UR: 7 — SIGNIFICANT CHANGE UP (ref 5–8)
PLATELET # BLD AUTO: 193 K/UL — SIGNIFICANT CHANGE UP (ref 150–400)
POTASSIUM SERPL-MCNC: 4.5 MMOL/L — SIGNIFICANT CHANGE UP (ref 3.5–5.3)
POTASSIUM SERPL-MCNC: 6.1 MMOL/L — HIGH (ref 3.5–5.3)
POTASSIUM SERPL-SCNC: 4.5 MMOL/L — SIGNIFICANT CHANGE UP (ref 3.5–5.3)
POTASSIUM SERPL-SCNC: 6.1 MMOL/L — HIGH (ref 3.5–5.3)
PROT SERPL-MCNC: 6.2 GM/DL — SIGNIFICANT CHANGE UP (ref 6–8.3)
PROT UR-MCNC: 100 MG/DL
PROTHROM AB SERPL-ACNC: 11.1 SEC — SIGNIFICANT CHANGE UP (ref 9.8–12.7)
RBC # BLD: 4.44 M/UL — SIGNIFICANT CHANGE UP (ref 4.2–5.8)
RBC # FLD: 11.6 % — SIGNIFICANT CHANGE UP (ref 10.3–14.5)
RBC CASTS # UR COMP ASSIST: SIGNIFICANT CHANGE UP /HPF (ref 0–4)
SODIUM SERPL-SCNC: 135 MMOL/L — SIGNIFICANT CHANGE UP (ref 135–145)
SODIUM SERPL-SCNC: 138 MMOL/L — SIGNIFICANT CHANGE UP (ref 135–145)
SP GR SPEC: 1 — LOW (ref 1.01–1.02)
THC UR QL: NEGATIVE — SIGNIFICANT CHANGE UP
TROPONIN I SERPL-MCNC: <0.015 NG/ML — SIGNIFICANT CHANGE UP (ref 0.01–0.04)
TYPE + AB SCN PNL BLD: SIGNIFICANT CHANGE UP
UROBILINOGEN FLD QL: NEGATIVE MG/DL — SIGNIFICANT CHANGE UP
WBC # BLD: 7 K/UL — SIGNIFICANT CHANGE UP (ref 3.8–10.5)
WBC # FLD AUTO: 7 K/UL — SIGNIFICANT CHANGE UP (ref 3.8–10.5)
WBC UR QL: SIGNIFICANT CHANGE UP

## 2017-08-07 PROCEDURE — 76377 3D RENDER W/INTRP POSTPROCES: CPT | Mod: 26

## 2017-08-07 PROCEDURE — 71010: CPT | Mod: 26

## 2017-08-07 PROCEDURE — 70486 CT MAXILLOFACIAL W/O DYE: CPT | Mod: 26

## 2017-08-07 PROCEDURE — 73060 X-RAY EXAM OF HUMERUS: CPT | Mod: 26,RT

## 2017-08-07 PROCEDURE — 73562 X-RAY EXAM OF KNEE 3: CPT | Mod: 26,LT

## 2017-08-07 PROCEDURE — 99285 EMERGENCY DEPT VISIT HI MDM: CPT

## 2017-08-07 PROCEDURE — 93010 ELECTROCARDIOGRAM REPORT: CPT

## 2017-08-07 PROCEDURE — 70450 CT HEAD/BRAIN W/O DYE: CPT | Mod: 26

## 2017-08-07 PROCEDURE — 72190 X-RAY EXAM OF PELVIS: CPT | Mod: 26

## 2017-08-07 PROCEDURE — 72141 MRI NECK SPINE W/O DYE: CPT | Mod: 26

## 2017-08-07 PROCEDURE — 72125 CT NECK SPINE W/O DYE: CPT | Mod: 26

## 2017-08-07 RX ORDER — PANTOPRAZOLE SODIUM 20 MG/1
40 TABLET, DELAYED RELEASE ORAL
Qty: 0 | Refills: 0 | Status: DISCONTINUED | OUTPATIENT
Start: 2017-08-07 | End: 2017-08-08

## 2017-08-07 RX ORDER — SODIUM CHLORIDE 9 MG/ML
1000 INJECTION INTRAMUSCULAR; INTRAVENOUS; SUBCUTANEOUS ONCE
Qty: 0 | Refills: 0 | Status: COMPLETED | OUTPATIENT
Start: 2017-08-07 | End: 2017-08-07

## 2017-08-07 RX ORDER — GABAPENTIN 400 MG/1
100 CAPSULE ORAL EVERY 8 HOURS
Qty: 0 | Refills: 0 | Status: DISCONTINUED | OUTPATIENT
Start: 2017-08-07 | End: 2017-08-08

## 2017-08-07 RX ORDER — ACETAMINOPHEN 500 MG
650 TABLET ORAL EVERY 6 HOURS
Qty: 0 | Refills: 0 | Status: DISCONTINUED | OUTPATIENT
Start: 2017-08-07 | End: 2017-08-08

## 2017-08-07 RX ORDER — GLUCAGON INJECTION, SOLUTION 0.5 MG/.1ML
1 INJECTION, SOLUTION SUBCUTANEOUS ONCE
Qty: 0 | Refills: 0 | Status: DISCONTINUED | OUTPATIENT
Start: 2017-08-07 | End: 2017-08-08

## 2017-08-07 RX ORDER — LEVOTHYROXINE SODIUM 125 MCG
75 TABLET ORAL DAILY
Qty: 0 | Refills: 0 | Status: DISCONTINUED | OUTPATIENT
Start: 2017-08-07 | End: 2017-08-08

## 2017-08-07 RX ORDER — SODIUM CHLORIDE 9 MG/ML
1000 INJECTION INTRAMUSCULAR; INTRAVENOUS; SUBCUTANEOUS
Qty: 0 | Refills: 0 | Status: DISCONTINUED | OUTPATIENT
Start: 2017-08-07 | End: 2017-08-08

## 2017-08-07 RX ORDER — DEXTROSE 50 % IN WATER 50 %
25 SYRINGE (ML) INTRAVENOUS ONCE
Qty: 0 | Refills: 0 | Status: DISCONTINUED | OUTPATIENT
Start: 2017-08-07 | End: 2017-08-08

## 2017-08-07 RX ORDER — ATORVASTATIN CALCIUM 80 MG/1
20 TABLET, FILM COATED ORAL AT BEDTIME
Qty: 0 | Refills: 0 | Status: DISCONTINUED | OUTPATIENT
Start: 2017-08-07 | End: 2017-08-08

## 2017-08-07 RX ORDER — OXCARBAZEPINE 300 MG/1
300 TABLET, FILM COATED ORAL
Qty: 0 | Refills: 0 | Status: DISCONTINUED | OUTPATIENT
Start: 2017-08-07 | End: 2017-08-08

## 2017-08-07 RX ORDER — DEXTROSE 50 % IN WATER 50 %
12.5 SYRINGE (ML) INTRAVENOUS ONCE
Qty: 0 | Refills: 0 | Status: DISCONTINUED | OUTPATIENT
Start: 2017-08-07 | End: 2017-08-08

## 2017-08-07 RX ORDER — METOPROLOL TARTRATE 50 MG
50 TABLET ORAL
Qty: 0 | Refills: 0 | Status: DISCONTINUED | OUTPATIENT
Start: 2017-08-07 | End: 2017-08-08

## 2017-08-07 RX ORDER — HYDROMORPHONE HYDROCHLORIDE 2 MG/ML
0.5 INJECTION INTRAMUSCULAR; INTRAVENOUS; SUBCUTANEOUS EVERY 4 HOURS
Qty: 0 | Refills: 0 | Status: DISCONTINUED | OUTPATIENT
Start: 2017-08-07 | End: 2017-08-08

## 2017-08-07 RX ORDER — ONDANSETRON 8 MG/1
4 TABLET, FILM COATED ORAL EVERY 6 HOURS
Qty: 0 | Refills: 0 | Status: DISCONTINUED | OUTPATIENT
Start: 2017-08-07 | End: 2017-08-08

## 2017-08-07 RX ORDER — MORPHINE SULFATE 50 MG/1
2 CAPSULE, EXTENDED RELEASE ORAL ONCE
Qty: 0 | Refills: 0 | Status: DISCONTINUED | OUTPATIENT
Start: 2017-08-07 | End: 2017-08-07

## 2017-08-07 RX ORDER — INSULIN LISPRO 100/ML
VIAL (ML) SUBCUTANEOUS
Qty: 0 | Refills: 0 | Status: DISCONTINUED | OUTPATIENT
Start: 2017-08-07 | End: 2017-08-08

## 2017-08-07 RX ORDER — TETANUS TOXOID, REDUCED DIPHTHERIA TOXOID AND ACELLULAR PERTUSSIS VACCINE, ADSORBED 5; 2.5; 8; 8; 2.5 [IU]/.5ML; [IU]/.5ML; UG/.5ML; UG/.5ML; UG/.5ML
0.5 SUSPENSION INTRAMUSCULAR ONCE
Qty: 0 | Refills: 0 | Status: COMPLETED | OUTPATIENT
Start: 2017-08-07 | End: 2017-08-07

## 2017-08-07 RX ORDER — TETANUS TOXOID, REDUCED DIPHTHERIA TOXOID AND ACELLULAR PERTUSSIS VACCINE, ADSORBED 5; 2.5; 8; 8; 2.5 [IU]/.5ML; [IU]/.5ML; UG/.5ML; UG/.5ML; UG/.5ML
0.5 SUSPENSION INTRAMUSCULAR ONCE
Qty: 0 | Refills: 0 | Status: DISCONTINUED | OUTPATIENT
Start: 2017-08-07 | End: 2017-08-07

## 2017-08-07 RX ORDER — HEPARIN SODIUM 5000 [USP'U]/ML
5000 INJECTION INTRAVENOUS; SUBCUTANEOUS EVERY 12 HOURS
Qty: 0 | Refills: 0 | Status: DISCONTINUED | OUTPATIENT
Start: 2017-08-07 | End: 2017-08-08

## 2017-08-07 RX ORDER — DEXTROSE 50 % IN WATER 50 %
1 SYRINGE (ML) INTRAVENOUS ONCE
Qty: 0 | Refills: 0 | Status: DISCONTINUED | OUTPATIENT
Start: 2017-08-07 | End: 2017-08-08

## 2017-08-07 RX ORDER — LACOSAMIDE 50 MG/1
50 TABLET ORAL
Qty: 0 | Refills: 0 | Status: DISCONTINUED | OUTPATIENT
Start: 2017-08-07 | End: 2017-08-08

## 2017-08-07 RX ORDER — LEVETIRACETAM 250 MG/1
750 TABLET, FILM COATED ORAL
Qty: 0 | Refills: 0 | Status: DISCONTINUED | OUTPATIENT
Start: 2017-08-07 | End: 2017-08-08

## 2017-08-07 RX ORDER — OXYCODONE HYDROCHLORIDE 5 MG/1
5 TABLET ORAL EVERY 6 HOURS
Qty: 0 | Refills: 0 | Status: DISCONTINUED | OUTPATIENT
Start: 2017-08-07 | End: 2017-08-08

## 2017-08-07 RX ORDER — INSULIN HUMAN 100 [IU]/ML
5 INJECTION, SOLUTION SUBCUTANEOUS ONCE
Qty: 0 | Refills: 0 | Status: COMPLETED | OUTPATIENT
Start: 2017-08-07 | End: 2017-08-07

## 2017-08-07 RX ORDER — SODIUM CHLORIDE 9 MG/ML
1000 INJECTION, SOLUTION INTRAVENOUS
Qty: 0 | Refills: 0 | Status: DISCONTINUED | OUTPATIENT
Start: 2017-08-07 | End: 2017-08-08

## 2017-08-07 RX ADMIN — MORPHINE SULFATE 2 MILLIGRAM(S): 50 CAPSULE, EXTENDED RELEASE ORAL at 14:43

## 2017-08-07 RX ADMIN — OXCARBAZEPINE 300 MILLIGRAM(S): 300 TABLET, FILM COATED ORAL at 21:03

## 2017-08-07 RX ADMIN — TETANUS TOXOID, REDUCED DIPHTHERIA TOXOID AND ACELLULAR PERTUSSIS VACCINE, ADSORBED 0.5 MILLILITER(S): 5; 2.5; 8; 8; 2.5 SUSPENSION INTRAMUSCULAR at 13:05

## 2017-08-07 RX ADMIN — ATORVASTATIN CALCIUM 20 MILLIGRAM(S): 80 TABLET, FILM COATED ORAL at 21:03

## 2017-08-07 RX ADMIN — GABAPENTIN 100 MILLIGRAM(S): 400 CAPSULE ORAL at 17:48

## 2017-08-07 RX ADMIN — SODIUM CHLORIDE 100 MILLILITER(S): 9 INJECTION INTRAMUSCULAR; INTRAVENOUS; SUBCUTANEOUS at 21:04

## 2017-08-07 RX ADMIN — SODIUM CHLORIDE 1000 MILLILITER(S): 9 INJECTION INTRAMUSCULAR; INTRAVENOUS; SUBCUTANEOUS at 14:43

## 2017-08-07 RX ADMIN — GABAPENTIN 100 MILLIGRAM(S): 400 CAPSULE ORAL at 21:04

## 2017-08-07 RX ADMIN — INSULIN HUMAN 5 UNIT(S): 100 INJECTION, SOLUTION SUBCUTANEOUS at 14:43

## 2017-08-07 RX ADMIN — PANTOPRAZOLE SODIUM 40 MILLIGRAM(S): 20 TABLET, DELAYED RELEASE ORAL at 17:48

## 2017-08-07 RX ADMIN — Medication 50 MILLIGRAM(S): at 21:04

## 2017-08-07 RX ADMIN — LEVETIRACETAM 750 MILLIGRAM(S): 250 TABLET, FILM COATED ORAL at 21:03

## 2017-08-07 RX ADMIN — LACOSAMIDE 50 MILLIGRAM(S): 50 TABLET ORAL at 21:03

## 2017-08-07 NOTE — CONSULT NOTE ADULT - SENSORY
Decreased to pin-prick b/l upper extremities with burning pain   slight decrease to pin-prick b/l lower extremities, not as significant as upper extremities, no pain
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
## 2017-08-07 NOTE — ED ADULT NURSE NOTE - INTEGUMENTARY WDL
Color consistent with ethnicity/race, warm,, resilient. Multiple abrasions noted, asee trauma flowsheet.

## 2017-08-07 NOTE — ED PROVIDER NOTE - ATTENDING CONTRIBUTION TO CARE
Dr. Deluna: I have personally performed a face to face bedside history and physical examination of this patient. I have discussed the history, examination, review of systems, assessment and plan of management with the resident. I have reviewed the electronic medical record and amended it to reflect my history, review of systems, physical exam, assessment and plan.
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
## 2017-08-07 NOTE — PROVIDER CONTACT NOTE (OTHER) - SITUATION
please evaluate known pt for cardiac management
please evaluate pt with extremity weakness s/p fall, r/o central cord syndrome  Office aware
manjeet Hernandez
please evaluate pt for anticoagulation needs  Shereen hawkins

## 2017-08-07 NOTE — ED PROVIDER NOTE - MEDICAL DECISION MAKING DETAILS
73 y/o M, on ASA, recent dilation of eyes today at eye doctor, with a h/o DM, BIBA c/o acute onset of dizziness while walking on sidewalk. Fall on sidewalk, suspect ? syncope. Superficial facial abrasions and lacs. Mild weakness bilat arms and right leg.     Plan CT head, C-spine, facial bones, labs, EKG, serial Adalberto, ERASMO consult. Observe, resassess.

## 2017-08-07 NOTE — CONSULT NOTE ADULT - PROBLEM SELECTOR RECOMMENDATION 9
Admit to Medicine or Trauma  MRI of cervical spine  No steroids, increase gabapentin to 100mg three times a day  Pain meds as needed  Will order Adjustable vista cervical collar to be worn at all times for at least 2 weeks   Ambulate with assistance

## 2017-08-07 NOTE — ED PROVIDER NOTE - NEUROLOGICAL, MLM
Speech is slightly slurred. Alert and oriented x3, no focal deficits, no sensory deficits. Moves bilat UE, 4+/5 motor proximal hand grasp. 4/5 strength bilat. Speech is slightly slurred. Alert and oriented x3, no sensory deficits. Moves bilat UE, 4+/5 motor proximal, hand grasp 4/5 strength bilat.

## 2017-08-07 NOTE — ED PROVIDER NOTE - ENMT, MLM
Airway patent, Nasal mucosa clear. Mouth with normal mucosa. Throat has no vesicles, no oropharyngeal exudates and uvula is midline. Teeth are stable and non-tender. Lewis's negative.

## 2017-08-07 NOTE — CONSULT NOTE ADULT - CRANIAL NERVE
Right eye dilated at doctor earlier today, left eye round and reactive  face symmetric, sensation intact, tongue midline
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
## 2017-08-07 NOTE — H&P ADULT - ASSESSMENT
A/P:  R/O central cord syndrome  S/P unwitnessed fall, r/o syncope  F/U MRI cspine  Hospitalist consult for medical management  Cardiology consult  Anticoagulation consult  Neurosurgery on consult  Multiple medical comorbidities of HLD, Asthma, Vertebral artery dissection, Sturge-Quinonez syndrome, HTN, Hypothyroidism, Epilepsy, DMII, CKD   Monitor neurochecks  NPO, IV hydration  GI/DVT prophylaxis  Pain control  F/U labs  Maintain hard cervical collar at present  Pt will be monitored for signs of evolution/resolution of pathology and surgical intervention as required and warranted  Pt aware of and agrees with all of the above

## 2017-08-07 NOTE — ED PROVIDER NOTE - PROGRESS NOTE DETAILS
Dr. Deluna:  CTs not reported thus far.  Pt w/ mild B/L UE & RLE weakness.  Griffin ZIMMERMAN. Trinh (scribe): Dr. Deluna d/w Dr. Celis (neurosurgery). States no IV steroids. Requests C-spine immobilization and MRI C-spine. He will have neurosurgery PA see pt and he himself will come to evaluate pt. Trinh (scribe): Dr. Deluna d/w Dr. Sweet (trauma surgery). States if MRI shows pathology, he will accept pt to trauma service, otherwise, pt can be admitted to hospitalist. Trinh (scribe): Neurosurgical PA, Claudia, at bedside. Raghav Bhatt MD PGY4: Labs, imaging reviewed. Seen by Dr. Celis and Kee in ED. Received IVF, insulin for hyperK. Case d/w Dr. Sweet, accepted admission.

## 2017-08-07 NOTE — H&P ADULT - HISTORY OF PRESENT ILLNESS
Trauma Consult    Pt s/p unwitnessed fall from standing height, r/o syncope, (+) LOC. Pt had right eye dilated by opthamologist 8/7/17. Pt states c/o dizziness prior to fall, b/l upper and to a lesser extent lower extremity weakness post fall. Pt denied any acute complaints otherwise.    Pt seen and examined at bedside with chaperone. Pt is AAOx3, pt in no acute distress. Pt denied c/o fever, chills, chest pain, SOB, abd pain, N/V/D, extremity pain, hemoptysis, hematemesis, hematuria, hematochexia, headache, diplopia, vertigo, dizzyness.

## 2017-08-07 NOTE — CONSULT NOTE ADULT - ASSESSMENT
Pt is a 72 year old man with a PMH of Epilepsy, Diabetes, and HTN who presented to  ED s/p fall with s/s of central cord syndrome in the setting of chronic degenerative disc disease

## 2017-08-07 NOTE — H&P ADULT - NSHPLABSRESULTS_GEN_ALL_CORE
Vital Signs Last 24 Hrs  T(C): 36.9 (07 Aug 2017 12:09), Max: 36.9 (07 Aug 2017 12:09)  T(F): 98.5 (07 Aug 2017 12:09), Max: 98.5 (07 Aug 2017 12:09)  HR: 70 (07 Aug 2017 12:09) (70 - 70)  BP: 146/122 (07 Aug 2017 12:09) (146/122 - 146/122)  BP(mean): --  RR: 20 (07 Aug 2017 12:09) (20 - 20)  SpO2: 96% (07 Aug 2017 12:09) (96% - 96%)    Labs:  CARDIAC MARKERS ( 07 Aug 2017 12:11 )  <0.015 ng/mL / x     / x     / x     / x                           13.6   7.0   )-----------( 193      ( 07 Aug 2017 12:11 )             38.2     CBC Full  -  ( 07 Aug 2017 12:11 )  WBC Count : 7.0 K/uL  Hemoglobin : 13.6 g/dL  Hematocrit : 38.2 %  Platelet Count - Automated : 193 K/uL  Mean Cell Volume : 86.2 fl  Mean Cell Hemoglobin : 30.8 pg  Mean Cell Hemoglobin Concentration : 35.7 gm/dL  Auto Neutrophil # : 4.8 K/uL  Auto Lymphocyte # : 1.2 K/uL  Auto Monocyte # : 0.5 K/uL  Auto Eosinophil # : 0.4 K/uL  Auto Basophil # : 0.1 K/uL  Auto Neutrophil % : 68.5 %  Auto Lymphocyte % : 17.5 %  Auto Monocyte % : 7.2 %  Auto Eosinophil % : 5.7 %  Auto Basophil % : 1.1 %    08-07    135  |  102  |  27<H>  ----------------------------<  357<H>  6.1<H>   |  26  |  1.66<H>    Ca    8.7      07 Aug 2017 12:11    TPro  6.2  /  Alb  3.1<L>  /  TBili  0.5  /  DBili  x   /  AST  26  /  ALT  26  /  AlkPhos  91  08-07    LIVER FUNCTIONS - ( 07 Aug 2017 12:11 )  Alb: 3.1 g/dL / Pro: 6.2 gm/dL / ALK PHOS: 91 U/L / ALT: 26 U/L / AST: 26 U/L / GGT: x           PT/INR - ( 07 Aug 2017 12:11 )   PT: 11.1 sec;   INR: 1.03 ratio    PTT - ( 07 Aug 2017 12:11 )  PTT:31.0 sec    Radiology:  Pending MRI cspine    EXAM:  CT BRAIN                          PROCEDURE DATE:  08/07/2017      INTERPRETATION:      CT head without IV contrast        IMPRESSION:   Unchanged calcified venous angioma in the LEFT parietal   lobe.  Atrophy of the LEFT hemisphere is again noted.     RIGHT   periorbital soft tissue swelling.        EXAM:  CT MAXILLOFACIAL                          EXAM[facility]  [facility]                          PROCEDURE DATE:  08/07/2017      INTERPRETATION:      CT facial bones without IV contrast         IMPRESSION: minimally displaced fracture at the tip of the nasal bones.   RIGHT periorbital and forehead soft tissue swelling is noted.      EXAM:  CT CERVICAL SPINE                          PROCEDURE DATE:  08/07/2017      INTERPRETATION:      CT cervical spine without IV contrast        IMPRESSION:   No vertebral fracture is recognized.   Multilevel   degenerative  disc disease and spondylosis at C3-4 through C7-T1   withnarrowing of the LEFT C2-3, BILATERAL C3-4 through C6-7 neural   foramina due to uncovertebral spurring and facet osteophytic hypertrophy.   Cord impingement is seen at C3-4 through C7-T1 on a degenerative basis.

## 2017-08-07 NOTE — ED PROVIDER NOTE - MUSCULOSKELETAL MINIMAL EXAM
Bilat mild upper arm tenderness without swelling or gross deformity. Pt in C-collar. No neck tenderness. Chest is non-tender and stable. Mild soft tissue swelling overlying left patella, non-tenderness. Left knee active FROM, joint stable. Left SLR, 50 degrees, 5/5 motor. Right SLR 4/5, 30 degrees. Bilat distal motor sensory intact. Bilat mild upper arm tenderness without swelling or gross deformity. Pt in C-collar. No neck tenderness. Chest is non-tender and stable.

## 2017-08-08 ENCOUNTER — RESULT REVIEW (OUTPATIENT)
Age: 72
End: 2017-08-08

## 2017-08-08 ENCOUNTER — APPOINTMENT (OUTPATIENT)
Dept: NEUROSURGERY | Facility: HOSPITAL | Age: 72
End: 2017-08-08

## 2017-08-08 LAB
ANION GAP SERPL CALC-SCNC: 9 MMOL/L — SIGNIFICANT CHANGE UP (ref 5–17)
BASOPHILS # BLD AUTO: 0.1 K/UL — SIGNIFICANT CHANGE UP (ref 0–0.2)
BASOPHILS NFR BLD AUTO: 0.5 % — SIGNIFICANT CHANGE UP (ref 0–2)
BUN SERPL-MCNC: 22 MG/DL — SIGNIFICANT CHANGE UP (ref 7–23)
CALCIUM SERPL-MCNC: 8.4 MG/DL — LOW (ref 8.5–10.1)
CHLORIDE SERPL-SCNC: 104 MMOL/L — SIGNIFICANT CHANGE UP (ref 96–108)
CO2 SERPL-SCNC: 23 MMOL/L — SIGNIFICANT CHANGE UP (ref 22–31)
CREAT SERPL-MCNC: 1.32 MG/DL — HIGH (ref 0.5–1.3)
EOSINOPHIL # BLD AUTO: 0 K/UL — SIGNIFICANT CHANGE UP (ref 0–0.5)
EOSINOPHIL NFR BLD AUTO: 0.1 % — SIGNIFICANT CHANGE UP (ref 0–6)
GLUCOSE SERPL-MCNC: 183 MG/DL — HIGH (ref 70–99)
HBA1C BLD-MCNC: 7.8 % — HIGH (ref 4–5.6)
HCT VFR BLD CALC: 35.4 % — LOW (ref 39–50)
HGB BLD-MCNC: 12.8 G/DL — LOW (ref 13–17)
LYMPHOCYTES # BLD AUTO: 1.2 K/UL — SIGNIFICANT CHANGE UP (ref 1–3.3)
LYMPHOCYTES # BLD AUTO: 10.4 % — LOW (ref 13–44)
MCHC RBC-ENTMCNC: 31.2 PG — SIGNIFICANT CHANGE UP (ref 27–34)
MCHC RBC-ENTMCNC: 36.2 GM/DL — HIGH (ref 32–36)
MCV RBC AUTO: 86.1 FL — SIGNIFICANT CHANGE UP (ref 80–100)
MONOCYTES # BLD AUTO: 0.8 K/UL — SIGNIFICANT CHANGE UP (ref 0–0.9)
MONOCYTES NFR BLD AUTO: 7.2 % — SIGNIFICANT CHANGE UP (ref 2–14)
NEUTROPHILS # BLD AUTO: 9.1 K/UL — HIGH (ref 1.8–7.4)
NEUTROPHILS NFR BLD AUTO: 81.8 % — HIGH (ref 43–77)
PLATELET # BLD AUTO: 202 K/UL — SIGNIFICANT CHANGE UP (ref 150–400)
POTASSIUM SERPL-MCNC: 4.1 MMOL/L — SIGNIFICANT CHANGE UP (ref 3.5–5.3)
POTASSIUM SERPL-SCNC: 4.1 MMOL/L — SIGNIFICANT CHANGE UP (ref 3.5–5.3)
RBC # BLD: 4.12 M/UL — LOW (ref 4.2–5.8)
RBC # FLD: 11.4 % — SIGNIFICANT CHANGE UP (ref 10.3–14.5)
SODIUM SERPL-SCNC: 136 MMOL/L — SIGNIFICANT CHANGE UP (ref 135–145)
TSH SERPL-MCNC: 0.92 UU/ML — SIGNIFICANT CHANGE UP (ref 0.36–3.74)
WBC # BLD: 11.2 K/UL — HIGH (ref 3.8–10.5)
WBC # FLD AUTO: 11.2 K/UL — HIGH (ref 3.8–10.5)

## 2017-08-08 PROCEDURE — 22845 INSERT SPINE FIXATION DEVICE: CPT

## 2017-08-08 PROCEDURE — 93010 ELECTROCARDIOGRAM REPORT: CPT

## 2017-08-08 PROCEDURE — 88304 TISSUE EXAM BY PATHOLOGIST: CPT | Mod: 26

## 2017-08-08 PROCEDURE — 20931 SP BONE ALGRFT STRUCT ADD-ON: CPT

## 2017-08-08 PROCEDURE — 22552 ARTHRD ANT NTRBD CERVICAL EA: CPT

## 2017-08-08 PROCEDURE — 22551 ARTHRD ANT NTRBDY CERVICAL: CPT

## 2017-08-08 PROCEDURE — 72040 X-RAY EXAM NECK SPINE 2-3 VW: CPT | Mod: 26

## 2017-08-08 PROCEDURE — 99223 1ST HOSP IP/OBS HIGH 75: CPT

## 2017-08-08 RX ORDER — INSULIN LISPRO 100/ML
VIAL (ML) SUBCUTANEOUS EVERY 6 HOURS
Qty: 0 | Refills: 0 | Status: DISCONTINUED | OUTPATIENT
Start: 2017-08-08 | End: 2017-08-10

## 2017-08-08 RX ORDER — SODIUM CHLORIDE 9 MG/ML
1000 INJECTION, SOLUTION INTRAVENOUS
Qty: 0 | Refills: 0 | Status: DISCONTINUED | OUTPATIENT
Start: 2017-08-08 | End: 2017-08-08

## 2017-08-08 RX ORDER — LACOSAMIDE 50 MG/1
50 TABLET ORAL
Qty: 0 | Refills: 0 | Status: DISCONTINUED | OUTPATIENT
Start: 2017-08-08 | End: 2017-08-10

## 2017-08-08 RX ORDER — DEXTROSE 50 % IN WATER 50 %
12.5 SYRINGE (ML) INTRAVENOUS ONCE
Qty: 0 | Refills: 0 | Status: DISCONTINUED | OUTPATIENT
Start: 2017-08-08 | End: 2017-08-10

## 2017-08-08 RX ORDER — PANTOPRAZOLE SODIUM 20 MG/1
40 TABLET, DELAYED RELEASE ORAL
Qty: 0 | Refills: 0 | Status: DISCONTINUED | OUTPATIENT
Start: 2017-08-08 | End: 2017-08-10

## 2017-08-08 RX ORDER — CEFAZOLIN SODIUM 1 G
1000 VIAL (EA) INJECTION EVERY 8 HOURS
Qty: 0 | Refills: 0 | Status: COMPLETED | OUTPATIENT
Start: 2017-08-09 | End: 2017-08-10

## 2017-08-08 RX ORDER — INSULIN LISPRO 100/ML
VIAL (ML) SUBCUTANEOUS EVERY 6 HOURS
Qty: 0 | Refills: 0 | Status: DISCONTINUED | OUTPATIENT
Start: 2017-08-08 | End: 2017-08-08

## 2017-08-08 RX ORDER — LEVETIRACETAM 250 MG/1
750 TABLET, FILM COATED ORAL
Qty: 0 | Refills: 0 | Status: DISCONTINUED | OUTPATIENT
Start: 2017-08-08 | End: 2017-08-10

## 2017-08-08 RX ORDER — HYDROMORPHONE HYDROCHLORIDE 2 MG/ML
1 INJECTION INTRAMUSCULAR; INTRAVENOUS; SUBCUTANEOUS EVERY 6 HOURS
Qty: 0 | Refills: 0 | Status: DISCONTINUED | OUTPATIENT
Start: 2017-08-09 | End: 2017-08-10

## 2017-08-08 RX ORDER — DEXTROSE 50 % IN WATER 50 %
25 SYRINGE (ML) INTRAVENOUS ONCE
Qty: 0 | Refills: 0 | Status: DISCONTINUED | OUTPATIENT
Start: 2017-08-08 | End: 2017-08-10

## 2017-08-08 RX ORDER — OXCARBAZEPINE 300 MG/1
300 TABLET, FILM COATED ORAL
Qty: 0 | Refills: 0 | Status: DISCONTINUED | OUTPATIENT
Start: 2017-08-08 | End: 2017-08-10

## 2017-08-08 RX ORDER — HYDROMORPHONE HYDROCHLORIDE 2 MG/ML
1 INJECTION INTRAMUSCULAR; INTRAVENOUS; SUBCUTANEOUS
Qty: 0 | Refills: 0 | Status: DISCONTINUED | OUTPATIENT
Start: 2017-08-08 | End: 2017-08-09

## 2017-08-08 RX ORDER — DEXTROSE 50 % IN WATER 50 %
1 SYRINGE (ML) INTRAVENOUS ONCE
Qty: 0 | Refills: 0 | Status: DISCONTINUED | OUTPATIENT
Start: 2017-08-08 | End: 2017-08-10

## 2017-08-08 RX ORDER — CEFAZOLIN SODIUM 1 G
1000 VIAL (EA) INJECTION EVERY 8 HOURS
Qty: 0 | Refills: 0 | Status: DISCONTINUED | OUTPATIENT
Start: 2017-08-08 | End: 2017-08-09

## 2017-08-08 RX ORDER — ATORVASTATIN CALCIUM 80 MG/1
20 TABLET, FILM COATED ORAL AT BEDTIME
Qty: 0 | Refills: 0 | Status: DISCONTINUED | OUTPATIENT
Start: 2017-08-08 | End: 2017-08-10

## 2017-08-08 RX ORDER — ONDANSETRON 8 MG/1
4 TABLET, FILM COATED ORAL EVERY 6 HOURS
Qty: 0 | Refills: 0 | Status: DISCONTINUED | OUTPATIENT
Start: 2017-08-08 | End: 2017-08-10

## 2017-08-08 RX ORDER — GLUCAGON INJECTION, SOLUTION 0.5 MG/.1ML
1 INJECTION, SOLUTION SUBCUTANEOUS ONCE
Qty: 0 | Refills: 0 | Status: DISCONTINUED | OUTPATIENT
Start: 2017-08-08 | End: 2017-08-10

## 2017-08-08 RX ORDER — LEVOTHYROXINE SODIUM 125 MCG
75 TABLET ORAL DAILY
Qty: 0 | Refills: 0 | Status: DISCONTINUED | OUTPATIENT
Start: 2017-08-08 | End: 2017-08-10

## 2017-08-08 RX ORDER — METOPROLOL TARTRATE 50 MG
50 TABLET ORAL
Qty: 0 | Refills: 0 | Status: DISCONTINUED | OUTPATIENT
Start: 2017-08-08 | End: 2017-08-10

## 2017-08-08 RX ORDER — FENTANYL CITRATE 50 UG/ML
50 INJECTION INTRAVENOUS
Qty: 0 | Refills: 0 | Status: DISCONTINUED | OUTPATIENT
Start: 2017-08-08 | End: 2017-08-08

## 2017-08-08 RX ORDER — GABAPENTIN 400 MG/1
100 CAPSULE ORAL EVERY 8 HOURS
Qty: 0 | Refills: 0 | Status: DISCONTINUED | OUTPATIENT
Start: 2017-08-08 | End: 2017-08-10

## 2017-08-08 RX ORDER — INSULIN LISPRO 100/ML
4 VIAL (ML) SUBCUTANEOUS ONCE
Qty: 0 | Refills: 0 | Status: COMPLETED | OUTPATIENT
Start: 2017-08-08 | End: 2017-08-08

## 2017-08-08 RX ADMIN — GABAPENTIN 100 MILLIGRAM(S): 400 CAPSULE ORAL at 05:00

## 2017-08-08 RX ADMIN — HYDROMORPHONE HYDROCHLORIDE 1 MILLIGRAM(S): 2 INJECTION INTRAMUSCULAR; INTRAVENOUS; SUBCUTANEOUS at 23:50

## 2017-08-08 RX ADMIN — OXCARBAZEPINE 300 MILLIGRAM(S): 300 TABLET, FILM COATED ORAL at 05:00

## 2017-08-08 RX ADMIN — Medication 50 MILLIGRAM(S): at 05:00

## 2017-08-08 RX ADMIN — SODIUM CHLORIDE 100 MILLILITER(S): 9 INJECTION INTRAMUSCULAR; INTRAVENOUS; SUBCUTANEOUS at 16:05

## 2017-08-08 RX ADMIN — LACOSAMIDE 50 MILLIGRAM(S): 50 TABLET ORAL at 05:00

## 2017-08-08 RX ADMIN — Medication 2: at 06:22

## 2017-08-08 RX ADMIN — Medication 4 UNIT(S): at 01:14

## 2017-08-08 RX ADMIN — GABAPENTIN 100 MILLIGRAM(S): 400 CAPSULE ORAL at 14:19

## 2017-08-08 RX ADMIN — PANTOPRAZOLE SODIUM 40 MILLIGRAM(S): 20 TABLET, DELAYED RELEASE ORAL at 05:00

## 2017-08-08 RX ADMIN — LACOSAMIDE 50 MILLIGRAM(S): 50 TABLET ORAL at 23:25

## 2017-08-08 RX ADMIN — Medication 50 MILLIGRAM(S): at 23:25

## 2017-08-08 RX ADMIN — Medication 5 MILLIGRAM(S): at 05:00

## 2017-08-08 RX ADMIN — GABAPENTIN 100 MILLIGRAM(S): 400 CAPSULE ORAL at 23:25

## 2017-08-08 RX ADMIN — ATORVASTATIN CALCIUM 20 MILLIGRAM(S): 80 TABLET, FILM COATED ORAL at 23:25

## 2017-08-08 RX ADMIN — Medication 2: at 21:49

## 2017-08-08 RX ADMIN — OXCARBAZEPINE 300 MILLIGRAM(S): 300 TABLET, FILM COATED ORAL at 23:25

## 2017-08-08 RX ADMIN — LEVETIRACETAM 750 MILLIGRAM(S): 250 TABLET, FILM COATED ORAL at 23:24

## 2017-08-08 RX ADMIN — SODIUM CHLORIDE 100 MILLILITER(S): 9 INJECTION INTRAMUSCULAR; INTRAVENOUS; SUBCUTANEOUS at 05:07

## 2017-08-08 RX ADMIN — Medication 75 MICROGRAM(S): at 05:00

## 2017-08-08 RX ADMIN — Medication 1: at 11:51

## 2017-08-08 RX ADMIN — LEVETIRACETAM 750 MILLIGRAM(S): 250 TABLET, FILM COATED ORAL at 05:00

## 2017-08-08 NOTE — PHYSICAL THERAPY INITIAL EVALUATION ADULT - PATIENT/FAMILY/SIGNIFICANT OTHER GOALS STATEMENT, PT EVAL
pt wants to be able to use B hands for independent ADLs,he is somewhat ambidexterous,throws with L hand,writes with R hand
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
## 2017-08-08 NOTE — CONSULT NOTE ADULT - ASSESSMENT
This is a 72 year old male with central cord syndrome s/p unwitnessed fall awaiting surgery. He is low risk thrombosis and low risk bleeding. Recommend heparin SQ Q12hr post-surgery as per neurosurgery. Discussed with patient at bedside. Verbalizes understanding for necessity for VTE prophylaxis.    Plan:  ::Heparin 5,000 units SQ Q12  ::CBC/BMP daily  ::Ambulation per neuro  ::Venodynes b/l This is a 72 year old male with central cord syndrome s/p unwitnessed fall awaiting surgery. He is low risk thrombosis and low risk bleeding. Recommend heparin SQ Q12hr post-surgery as per neurosurgery. Discussed with patient at bedside. Verbalizes understanding for necessity for VTE prophylaxis.    Plan:  ::Heparin 5,000 units SQ Q12  ::CBC/BMP daily  ::Ambulation per neuro  ::Venodynes b/l    Thank you for this consult, will continue to follow.

## 2017-08-08 NOTE — PHYSICAL THERAPY INITIAL EVALUATION ADULT - PATIENT PROFILE REVIEW, REHAB EVAL
yes no activity level is specified; per RN zulay be going to OR later today as ADD-ON for neck surgery due to severe stenosis,neurological compromise/yes

## 2017-08-08 NOTE — BRIEF OPERATIVE NOTE - PRE-OP DX
Central cord syndrome, initial encounter  08/08/2017    Active  Roberto Alvarez  Cervical stenosis of spine  08/08/2017    Active  Roberto Alvarez
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
## 2017-08-08 NOTE — PROGRESS NOTE ADULT - ASSESSMENT
A/P:  Central cord syndrome  S/P unwitnessed fall, r/o syncope  Hospitalist consult for medical management  Cardiology consult  Anticoagulation consult  Neurosurgery on consult  Multiple medical comorbidities of HLD, Asthma, Vertebral artery dissection, Sturge-Quinonez syndrome, HTN, Hypothyroidism, Epilepsy, DMII, CKD   Monitor neurochecks  NPO, IV hydration  GI/DVT prophylaxis  Pain control  F/U labs  Maintain hard cervical collar at present  Pt cleared from trauma standpoint for any indicated neuro/spine surgical intervention as required and warranted  Pt for spine surgical interventoin 8/8/17  Pt aware of and agrees with all of the above
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
## 2017-08-08 NOTE — PROGRESS NOTE ADULT - SUBJECTIVE AND OBJECTIVE BOX
CC:Patient is a 72y old  Male who presents with a chief complaint of s/p unwitnessed fall, (+) LOC, extremity weakness, 8/7/14 (07 Aug 2017 15:32)      Subjective:  Pt seen and examined at bedside with chaperone. Pt is AAOx3, pt in no acute distress. Pt denied c/o fever, chills, chest pain, SOB, abd pain, N/V/D, extremity pain, hemoptysis, hematemesis, hematuria, hematochexia, headache, diplopia, vertigo, dizzyness. Pt states (+) void, (+) bowel function    ROS:  extremity weakness,otherwise negative ROS    Vital Signs Last 24 Hrs  T(C): 36.4 (08 Aug 2017 08:58), Max: 36.8 (07 Aug 2017 21:31)  T(F): 97.5 (08 Aug 2017 08:58), Max: 98.3 (07 Aug 2017 21:31)  HR: 65 (08 Aug 2017 12:00) (60 - 81)  BP: 156/72 (08 Aug 2017 12:00) (117/52 - 162/80)  BP(mean): 91 (08 Aug 2017 12:00) (67 - 101)  RR: 13 (08 Aug 2017 12:00) (10 - 19)  SpO2: 100% (08 Aug 2017 12:00) (96% - 100%)    Labs:    CARDIAC MARKERS ( 07 Aug 2017 12:11 )  <0.015 ng/mL / x     / x     / x     / x                            12.8   11.2  )-----------( 202      ( 08 Aug 2017 05:35 )             35.4     CBC Full  -  ( 08 Aug 2017 05:35 )  WBC Count : 11.2 K/uL  Hemoglobin : 12.8 g/dL  Hematocrit : 35.4 %  Platelet Count - Automated : 202 K/uL  Mean Cell Volume : 86.1 fl  Mean Cell Hemoglobin : 31.2 pg  Mean Cell Hemoglobin Concentration : 36.2 gm/dL  Auto Neutrophil # : 9.1 K/uL  Auto Lymphocyte # : 1.2 K/uL  Auto Monocyte # : 0.8 K/uL  Auto Eosinophil # : 0.0 K/uL  Auto Basophil # : 0.1 K/uL  Auto Neutrophil % : 81.8 %  Auto Lymphocyte % : 10.4 %  Auto Monocyte % : 7.2 %  Auto Eosinophil % : 0.1 %  Auto Basophil % : 0.5 %    08-08    136  |  104  |  22  ----------------------------<  183<H>  4.1   |  23  |  1.32<H>    Ca    8.4<L>      08 Aug 2017 05:35    TPro  6.2  /  Alb  3.1<L>  /  TBili  0.5  /  DBili  x   /  AST  26  /  ALT  26  /  AlkPhos  91  08-07    LIVER FUNCTIONS - ( 07 Aug 2017 12:11 )  Alb: 3.1 g/dL / Pro: 6.2 gm/dL / ALK PHOS: 91 U/L / ALT: 26 U/L / AST: 26 U/L / GGT: x           PT/INR - ( 07 Aug 2017 12:11 )   PT: 11.1 sec;   INR: 1.03 ratio         PTT - ( 07 Aug 2017 12:11 )  PTT:31.0 sec      Meds:  gabapentin 100 milliGRAM(s) Oral every 8 hours  pantoprazole    Tablet 40 milliGRAM(s) Oral before breakfast  oxyCODONE    IR 5 milliGRAM(s) Oral every 6 hours PRN  HYDROmorphone  Injectable 0.5 milliGRAM(s) IV Push every 4 hours PRN  heparin  Injectable 5000 Unit(s) SubCutaneous every 12 hours  sodium chloride 0.9%. 1000 milliLiter(s) IV Continuous <Continuous>  acetaminophen   Tablet 650 milliGRAM(s) Oral every 6 hours PRN  ondansetron Injectable 4 milliGRAM(s) IV Push every 6 hours PRN  multivitamin 1 Tablet(s) Oral daily  dextrose 5%. 1000 milliLiter(s) IV Continuous <Continuous>  dextrose Gel 1 Dose(s) Oral once PRN  dextrose 50% Injectable 12.5 Gram(s) IV Push once  dextrose 50% Injectable 25 Gram(s) IV Push once  dextrose 50% Injectable 25 Gram(s) IV Push once  glucagon  Injectable 1 milliGRAM(s) IntraMuscular once PRN  enalapril 5 milliGRAM(s) Oral daily  levETIRAcetam 750 milliGRAM(s) Oral two times a day  OXcarbazepine 300 milliGRAM(s) Oral two times a day  lacosamide 50 milliGRAM(s) Oral two times a day  atorvastatin 20 milliGRAM(s) Oral at bedtime  metoprolol 50 milliGRAM(s) Oral two times a day  levothyroxine 75 MICROGram(s) Oral daily  insulin lispro (HumaLOG) corrective regimen sliding scale   SubCutaneous every 6 hours      Radiology:  < from: MRI Cervical Spine w/o Cont (08.07.17 @ 16:08) >  EXAM:  CERVICAL SPINE (MRI)W O CON                            PROCEDURE DATE:  08/07/2017          INTERPRETATION:  Exam Date: 8/7/2017 4:08 PM    MR cervical  without gadolinium     CLINICAL INFORMATION:  Bilateral upper extremity and right lower   extremity weakness status post fall. Evaluate for central spinal cord   injury.    TECHNIQUE:  Sagittal T1-weighted,T2-weighted, and inversion recovery   images, and axial T1 and T2-weighted gradient-echo images of the cervical   spine were obtained.         FINDINGS: Comparison is made to CT cervical spine of same day and MRI   cervical spine of February 20, 2016    Cervical vertebral alignment is intact.  Cervical vertebral body heights   are maintained.  Marrow signal intensity within cervical vertebral bodies   and posterior elements is unremarkable.  No osseous expansion, epidural   disease or paraspinal abnormality is found.           Multilevel degenerative changes as follows:    At C2/C3, there is a small disc bulge resulting in mildnarrowing of the   left neural foramen.    At C3/C4, there is a posterior disc bulge and bilateral uncovertebral   spurring resulting in moderate to severe narrowing of the left neural   foramen and mild right neural foraminal narrowing, and mild to moderate   central spinal canal stenosis. There is mild flattening ventral cord.    At C4/C5, there is a large posterior disc bulge and bilateral vertebral   spurring resulting in severe central spinal canal stenosis with chronic   appearing impingement on the cervical cord. There is severe bilateral   foraminal stenosis.    At C5/C6, there is a large posterior disc bulge and bilateral vertebral   spurring resulting in moderate to severe central spinal canal stenosis   with chronic appearing impingement on the cervical cord. There is severe   bilateral foraminal stenosis.    At C6/C7, there is a posterior disc bulge and bilateral vertebral   spurring resulting in moderate to severe bilateral foraminal narrowing   and mild central spinal canal stenosis.    At C7/T1, there is a small posterior disc bulge without significant   foraminal or central spinal canal stenosis.    Minimal T2 hyperintense signal within the cervical cord at the C5 level,   not definitively present on the prior exam, may reflect a small amount of   cord edema versus gliosis.    IMPRESSION:     Multilevel severe degenerative changes of cervical spine as detailed   above, with severe central spinal canal stenosis at C4/C5 and C5/C6, and   moderate to severe narrowing of the left neural foramen at C3/C4, severe   bilateral foraminal stenosis at C4/C5, severe bilateral foraminal   stenosis at C5/C6, and moderate to severe bilateral foraminal stenosis at   C6/C7. These findings are not significantly changed since prior exam.    Minimal T2 hyperintense signal within the cervical cord at the C5 level,   not definitively present on the prior exam, may reflect a small amount of   cord edema versus gliosis.                KRYSTLE PEÑA M.D., ATTENDING RADIOLOGIST  This document has been electronically signed. Aug  7 2017  4:37PM        < end of copied text >    < from: Xray Humerus, Bilateral (08.07.17 @ 14:29) >  EXAM:  HUMERUS - BILATERAL                            PROCEDURE DATE:  08/07/2017          INTERPRETATION:  Right HUMERUS: AP, lateral.    CLINICAL INFORMATION: Trauma and pain.    No prior studies are available for comparison.    FINDINGS:    There is no acute fracture or dislocation. Degenerative changes in the   right AC joint. Hypertrophic spurring in the posterior aspect of the ulna   olecranon process.     IMPRESSION:  No evidence of fractures or dislocations. Some degenerative changes.                ALDAIR YU M.D., ATTENDING RADIOLOGIST  This document has been electronically signed. Aug  7 2017  4:45PM    < end of copied text >  < from: Xray Knee 3 Views, Left (08.07.17 @ 14:27) >  EXAM:  XR KNEE 3 VIEWS LT                            PROCEDURE DATE:  08/07/2017          INTERPRETATION:  AP, oblique and lateral view of the  left knee dated   8/7/2017.    COMPARISON: None available.    CLINICAL INFORMATION: Status post fall, swelling and pain.    FINDINGS:  There is prepatellar soft tissue swelling.  The bones and joint spaces are normal.   There are no fractures or dislocations.  There is no joint effusion.A hypertrophic spur is visualized in the   superior aspect of the patella.    IMPRESSION:   Pre patellar soft tissue swelling but no evidence of fractures or   dislocations.                ALDAIR YU M.D., ATTENDING RADIOLOGIST  This document has been electronically signed. Aug  7 2017  4:27PM        < end of copied text >    Physical exam:  GCS of 15  Pt is aaox3  Pt in no acute distress  Airway is patent  Breathing is symmetric and unlabored  CN II-XII grossly intact  HEENT: normocephalic, (+) right periorbital and nasal ecchymosis unchanged since admission, BRETT, EOM wnl, no gross craniofacial bony pathology to exam  Neck: No tracheal deviation, no JVD, no crepitus, no ecchymosis, no hematoma  Chest: No gross rib or sternal pathology or tenderness to exam, no crepitus, no ecchymosis, no hematoma  Resp: CTAB  CVS: S1S2(+)  ABD: bowel sounds (+), soft, nontender, non distended, no rebound, no guarding, no rigidity, no pelvic instability to exam  EXT:  Pt has palpable b/l radial, femoral, dorsalis pedis pulses. All digits are warm and well perfused. No gross long bone pathology or tenderness to exam. Pt demonstrates diminished right upper>left upper ext strength 3/5, left lower ext strength of 4/5. Pt has good capillary refill to digits, no calf edema or tenderness to exam  Skin: no adverse skin changes to exam

## 2017-08-08 NOTE — CONSULT NOTE ADULT - ASSESSMENT
This is a 72 y/o M with a hx of PMHx notable for HTN, hyperlipidemia, vertebral artery dissection, DM type II, CKD III, asthma, and seizures on anti-epileptics admitted for fall found to have acute cervical cord compression.    # Fall  # Acute Cervical Neck Pain  # Acute Cervical Cord Compression - w/ mild edema.   - plan for neurosurgical intervention, decompression, possible laminectomy, care per surgery.   - stable EKG, no current chest pain.   - Cardiology consulted for further cardiac clearance. Medically stable to proceed. No signs of active infection.   - fall w/ prodrome suggestive of mechanical 2/2 dizziness/ blurred vision from eye dilated exam. Unlikely seizure activity however will check Trileptal level and TSH (hx of hypothyroidism).   - remain on cardiac monitor post op.   - Gabapentin increased to 100mg TID by Neurosurgery.   - cont w/ Pain Control.    # Seizure disorder - cont home meds. Was admitted in Feb for seizure activity at which point began treatment with anti-epileptics.     # HTN - normotensive, cont meds including Metoprolol and Enalpril.     # Type 2 DM - mildly uncontrolled, A1c 7.8%. Will switch to SSI q6hrs while NPO.     # HLD - cont statin.     Dispo: remain inpatient for surgery today. Thank you for consult, will continue to follow.   Total time > 65 mins.

## 2017-08-08 NOTE — PHYSICAL THERAPY INITIAL EVALUATION ADULT - DID THE PATIENT HAVE SURGERY?
n/a n/a/planned for later today due to severe central stenosis,and B foraninal stenosisi at multiple levels

## 2017-08-08 NOTE — PHYSICAL THERAPY INITIAL EVALUATION ADULT - GROSSLY INTACT, SENSORY
Grossly Intact/experiencing hypersensitivity over BUEs described as burning to lite touch over dorsal arms/forearms

## 2017-08-09 DIAGNOSIS — E11.9 TYPE 2 DIABETES MELLITUS WITHOUT COMPLICATIONS: ICD-10-CM

## 2017-08-09 DIAGNOSIS — G40.909 EPILEPSY, UNSPECIFIED, NOT INTRACTABLE, WITHOUT STATUS EPILEPTICUS: ICD-10-CM

## 2017-08-09 DIAGNOSIS — I10 ESSENTIAL (PRIMARY) HYPERTENSION: ICD-10-CM

## 2017-08-09 LAB
ANION GAP SERPL CALC-SCNC: 8 MMOL/L — SIGNIFICANT CHANGE UP (ref 5–17)
BASOPHILS # BLD AUTO: 0.1 K/UL — SIGNIFICANT CHANGE UP (ref 0–0.2)
BASOPHILS NFR BLD AUTO: 0.7 % — SIGNIFICANT CHANGE UP (ref 0–2)
BUN SERPL-MCNC: 19 MG/DL — SIGNIFICANT CHANGE UP (ref 7–23)
CALCIUM SERPL-MCNC: 8.6 MG/DL — SIGNIFICANT CHANGE UP (ref 8.5–10.1)
CHLORIDE SERPL-SCNC: 105 MMOL/L — SIGNIFICANT CHANGE UP (ref 96–108)
CO2 SERPL-SCNC: 27 MMOL/L — SIGNIFICANT CHANGE UP (ref 22–31)
CREAT SERPL-MCNC: 1.31 MG/DL — HIGH (ref 0.5–1.3)
EOSINOPHIL # BLD AUTO: 0 K/UL — SIGNIFICANT CHANGE UP (ref 0–0.5)
EOSINOPHIL NFR BLD AUTO: 0 % — SIGNIFICANT CHANGE UP (ref 0–6)
GLUCOSE SERPL-MCNC: 233 MG/DL — HIGH (ref 70–99)
HCT VFR BLD CALC: 41.6 % — SIGNIFICANT CHANGE UP (ref 39–50)
HGB BLD-MCNC: 14.6 G/DL — SIGNIFICANT CHANGE UP (ref 13–17)
LYMPHOCYTES # BLD AUTO: 1.1 K/UL — SIGNIFICANT CHANGE UP (ref 1–3.3)
LYMPHOCYTES # BLD AUTO: 9.2 % — LOW (ref 13–44)
MCHC RBC-ENTMCNC: 30.4 PG — SIGNIFICANT CHANGE UP (ref 27–34)
MCHC RBC-ENTMCNC: 35 GM/DL — SIGNIFICANT CHANGE UP (ref 32–36)
MCV RBC AUTO: 86.9 FL — SIGNIFICANT CHANGE UP (ref 80–100)
MONOCYTES # BLD AUTO: 0.9 K/UL — SIGNIFICANT CHANGE UP (ref 0–0.9)
MONOCYTES NFR BLD AUTO: 7.5 % — SIGNIFICANT CHANGE UP (ref 2–14)
NEUTROPHILS # BLD AUTO: 9.5 K/UL — HIGH (ref 1.8–7.4)
NEUTROPHILS NFR BLD AUTO: 82.6 % — HIGH (ref 43–77)
PLATELET # BLD AUTO: 193 K/UL — SIGNIFICANT CHANGE UP (ref 150–400)
POTASSIUM SERPL-MCNC: 4.7 MMOL/L — SIGNIFICANT CHANGE UP (ref 3.5–5.3)
POTASSIUM SERPL-SCNC: 4.7 MMOL/L — SIGNIFICANT CHANGE UP (ref 3.5–5.3)
RBC # BLD: 4.79 M/UL — SIGNIFICANT CHANGE UP (ref 4.2–5.8)
RBC # FLD: 11.7 % — SIGNIFICANT CHANGE UP (ref 10.3–14.5)
SODIUM SERPL-SCNC: 140 MMOL/L — SIGNIFICANT CHANGE UP (ref 135–145)
WBC # BLD: 11.5 K/UL — HIGH (ref 3.8–10.5)
WBC # FLD AUTO: 11.5 K/UL — HIGH (ref 3.8–10.5)

## 2017-08-09 PROCEDURE — 72040 X-RAY EXAM NECK SPINE 2-3 VW: CPT | Mod: 26

## 2017-08-09 RX ORDER — ACETAMINOPHEN 500 MG
650 TABLET ORAL EVERY 6 HOURS
Qty: 0 | Refills: 0 | Status: DISCONTINUED | OUTPATIENT
Start: 2017-08-09 | End: 2017-08-10

## 2017-08-09 RX ADMIN — Medication 75 MICROGRAM(S): at 05:23

## 2017-08-09 RX ADMIN — Medication 100 MILLIGRAM(S): at 00:53

## 2017-08-09 RX ADMIN — GABAPENTIN 100 MILLIGRAM(S): 400 CAPSULE ORAL at 05:24

## 2017-08-09 RX ADMIN — HYDROMORPHONE HYDROCHLORIDE 1 MILLIGRAM(S): 2 INJECTION INTRAMUSCULAR; INTRAVENOUS; SUBCUTANEOUS at 00:30

## 2017-08-09 RX ADMIN — Medication 100 MILLIGRAM(S): at 17:13

## 2017-08-09 RX ADMIN — OXCARBAZEPINE 300 MILLIGRAM(S): 300 TABLET, FILM COATED ORAL at 21:13

## 2017-08-09 RX ADMIN — LEVETIRACETAM 750 MILLIGRAM(S): 250 TABLET, FILM COATED ORAL at 21:13

## 2017-08-09 RX ADMIN — Medication 5 MILLIGRAM(S): at 05:23

## 2017-08-09 RX ADMIN — Medication 50 MILLIGRAM(S): at 17:14

## 2017-08-09 RX ADMIN — Medication 50 MILLIGRAM(S): at 05:23

## 2017-08-09 RX ADMIN — OXCARBAZEPINE 300 MILLIGRAM(S): 300 TABLET, FILM COATED ORAL at 09:24

## 2017-08-09 RX ADMIN — LEVETIRACETAM 750 MILLIGRAM(S): 250 TABLET, FILM COATED ORAL at 09:24

## 2017-08-09 RX ADMIN — Medication 2: at 12:25

## 2017-08-09 RX ADMIN — Medication 2: at 05:32

## 2017-08-09 RX ADMIN — LACOSAMIDE 50 MILLIGRAM(S): 50 TABLET ORAL at 21:13

## 2017-08-09 RX ADMIN — PANTOPRAZOLE SODIUM 40 MILLIGRAM(S): 20 TABLET, DELAYED RELEASE ORAL at 09:24

## 2017-08-09 RX ADMIN — Medication 3: at 17:22

## 2017-08-09 RX ADMIN — Medication 650 MILLIGRAM(S): at 10:19

## 2017-08-09 RX ADMIN — Medication 100 MILLIGRAM(S): at 09:23

## 2017-08-09 RX ADMIN — Medication 1 TABLET(S): at 14:48

## 2017-08-09 RX ADMIN — ATORVASTATIN CALCIUM 20 MILLIGRAM(S): 80 TABLET, FILM COATED ORAL at 21:13

## 2017-08-09 RX ADMIN — GABAPENTIN 100 MILLIGRAM(S): 400 CAPSULE ORAL at 21:13

## 2017-08-09 RX ADMIN — LACOSAMIDE 50 MILLIGRAM(S): 50 TABLET ORAL at 09:27

## 2017-08-09 RX ADMIN — GABAPENTIN 100 MILLIGRAM(S): 400 CAPSULE ORAL at 14:48

## 2017-08-09 NOTE — PHYSICAL THERAPY INITIAL EVALUATION ADULT - MANUAL MUSCLE TESTING RESULTS, REHAB EVAL
Pt with R>L UE weakness bilaterally with clumbsiness /floppy quality C4 key muscle (levator scap=4/5 B;C5 (deltoids) 4/5,C6(biceps) 4/5,C7(triceps)R= 3-/5,L= 4-/5,R wrist EXT 3-/5,L wrist EXT 4-/5,MCP EXT 3-/5 R hand,L=4-/5,C8(ADQ) R hand 3-/5,L=4-/5; diminished  R 3-/5,R 4-/5; BLES 4/5 proximally,5/5 distally
B LEs 5/5, B UEs 4/5 grossly except B hands  2,3/5

## 2017-08-09 NOTE — PHYSICAL THERAPY INITIAL EVALUATION ADULT - ACTIVE RANGE OF MOTION EXAMINATION, REHAB EVAL
bilateral upper extremity Active ROM was WFL (within functional limits)/bilateral  lower extremity Active ROM was WFL (within functional limits)/RUE with diminished R elbow EXT,R wrist ext to about neutral and diminished EXT at MCP joints R hand c/w C7 radiculopathy/radial neuropathy,also decreased ABD digits/deficits as listed below
B hands edematouse, not able to fully close actively./no Active ROM deficits were identified

## 2017-08-09 NOTE — PHYSICAL THERAPY INITIAL EVALUATION ADULT - ADDITIONAL COMMENTS
Not driving due to h/o seizures.
resides 2nd floor apt at [facility] ,does not own a car and is dependent on transportation

## 2017-08-09 NOTE — PHYSICAL THERAPY INITIAL EVALUATION ADULT - PERTINENT HX OF CURRENT PROBLEM, REHAB EVAL
fall from standing height with + LOC,?syncope,complaining of BUE >>BLE weakness after fall
71 yo M admmitted post fall at home. ? seizure.  CT (+) for cord impingement is seen at C3-4 through C7-T1 on a degenerative basis.

## 2017-08-09 NOTE — PHYSICAL THERAPY INITIAL EVALUATION ADULT - IMPAIRMENTS FOUND, PT EVAL
muscle strength/cranial and peripheral nerve integrity/gross motor/sensory integrity/reflex integrity/fine motor
gross motor/poor safety awareness/muscle strength/gait, locomotion, and balance

## 2017-08-09 NOTE — PHYSICAL THERAPY INITIAL EVALUATION ADULT - DIAGNOSIS, PT EVAL
fall,severe central stenosis C4-6,?small amount of cord edema at C5 on MRI,severe lateral/foraminal stenosis L C3/4,B C4/5,B C5/6,B C6/7
Central cord syndrome; s/p Anterior cervical discectomy and fusion C4/5. C 5/6  on 8/8/17 by neurosurgery

## 2017-08-09 NOTE — PHYSICAL THERAPY INITIAL EVALUATION ADULT - ORIENTATION, REHAB EVAL
situation/place/pt thinks he has been here a few days instead of just 1 day/person
oriented to person, place, time and situation

## 2017-08-09 NOTE — PHYSICAL THERAPY INITIAL EVALUATION ADULT - CRITERIA FOR SKILLED THERAPEUTIC INTERVENTIONS
predicted duration of therapy intervention/risk reduction/prevention/therapy frequency/impairments found/anticipated discharge recommendation/rehab potential
impairments found

## 2017-08-09 NOTE — PROGRESS NOTE ADULT - SUBJECTIVE AND OBJECTIVE BOX
CC:Patient is a 72y old  Male who presents with a chief complaint of s/p unwitnessed fall, (+) LOC, extremity weakness, 8/7/14 (07 Aug 2017 15:32)      Subjective:  Pt seen and examined at bedside with chaperone. Pt is AAOx3, pt in no acute distress. Pt states improved c/o weakness s/p neurosurgical intervention. Pt denied c/o fever, chills, chest pain, SOB, abd pain, N/V/D, extremity pain, hemoptysis, hematemesis, hematuria, hematochexia, headache, diplopia, vertigo, dizzyness. Pt states (+) void, (+) bowel function    Pt s/p Anterior cervical discectomy and fusion 8/8/17 by neurosurgery    ROS:  extremity weakness,otherwise negative ROS      Vital Signs Last 24 Hrs  T(C): 36.8 (09 Aug 2017 08:29), Max: 36.9 (08 Aug 2017 21:20)  T(F): 98.3 (09 Aug 2017 08:29), Max: 98.4 (08 Aug 2017 21:20)  HR: 71 (09 Aug 2017 10:00) (59 - 83)  BP: 137/61 (09 Aug 2017 10:00) (132/66 - 175/78)  BP(mean): 79 (09 Aug 2017 10:00) (79 - 107)  RR: 17 (09 Aug 2017 10:00) (8 - 20)  SpO2: 96% (09 Aug 2017 10:00) (96% - 100%)    Labs:    CARDIAC MARKERS ( 07 Aug 2017 12:11 )  <0.015 ng/mL / x     / x     / x     / x                             14.6   11.5  )-----------( 193      ( 09 Aug 2017 06:08 )             41.6     CBC Full  -  ( 09 Aug 2017 06:08 )  WBC Count : 11.5 K/uL  Hemoglobin : 14.6 g/dL  Hematocrit : 41.6 %  Platelet Count - Automated : 193 K/uL  Mean Cell Volume : 86.9 fl  Mean Cell Hemoglobin : 30.4 pg  Mean Cell Hemoglobin Concentration : 35.0 gm/dL  Auto Neutrophil # : 9.5 K/uL  Auto Lymphocyte # : 1.1 K/uL  Auto Monocyte # : 0.9 K/uL  Auto Eosinophil # : 0.0 K/uL  Auto Basophil # : 0.1 K/uL  Auto Neutrophil % : 82.6 %  Auto Lymphocyte % : 9.2 %  Auto Monocyte % : 7.5 %  Auto Eosinophil % : 0.0 %  Auto Basophil % : 0.7 %    08-09    140  |  105  |  19  ----------------------------<  233<H>  4.7   |  27  |  1.31<H>    Ca    8.6      09 Aug 2017 06:08    TPro  6.2  /  Alb  3.1<L>  /  TBili  0.5  /  DBili  x   /  AST  26  /  ALT  26  /  AlkPhos  91  08-07    LIVER FUNCTIONS - ( 07 Aug 2017 12:11 )  Alb: 3.1 g/dL / Pro: 6.2 gm/dL / ALK PHOS: 91 U/L / ALT: 26 U/L / AST: 26 U/L / GGT: x           PT/INR - ( 07 Aug 2017 12:11 )   PT: 11.1 sec;   INR: 1.03 ratio         PTT - ( 07 Aug 2017 12:11 )  PTT:31.0 sec      Meds:  atorvastatin 20 milliGRAM(s) Oral at bedtime  dextrose 50% Injectable 25 Gram(s) IV Push once  dextrose 50% Injectable 25 Gram(s) IV Push once  dextrose 50% Injectable 12.5 Gram(s) IV Push once  dextrose Gel 1 Dose(s) Oral once PRN  enalapril 5 milliGRAM(s) Oral daily  gabapentin 100 milliGRAM(s) Oral every 8 hours  glucagon  Injectable 1 milliGRAM(s) IntraMuscular once PRN  insulin lispro (HumaLOG) corrective regimen sliding scale   SubCutaneous every 6 hours  lacosamide 50 milliGRAM(s) Oral two times a day  levETIRAcetam 750 milliGRAM(s) Oral two times a day  levothyroxine 75 MICROGram(s) Oral daily  metoprolol 50 milliGRAM(s) Oral two times a day  multivitamin 1 Tablet(s) Oral daily  ondansetron Injectable 4 milliGRAM(s) IV Push every 6 hours PRN  OXcarbazepine 300 milliGRAM(s) Oral two times a day  pantoprazole    Tablet 40 milliGRAM(s) Oral before breakfast  HYDROmorphone  Injectable 1 milliGRAM(s) SubCutaneous every 6 hours PRN  HYDROmorphone   Tablet 1 milliGRAM(s) Oral every 2 hours PRN  ceFAZolin   IVPB 1000 milliGRAM(s) IV Intermittent every 8 hours  acetaminophen   Tablet. 650 milliGRAM(s) Oral every 6 hours PRN      Radiology:      Physical exam:  GCS of 15  Pt is aaox3  Pt in no acute distress  Airway is patent  Breathing is symmetric and unlabored  CN II-XII grossly intact  HEENT: normocephalic, (+) right periorbital and nasal ecchymosis unchanged since admission, BRETT, EOM wnl, no gross craniofacial bony pathology to exam  Neck: No tracheal deviation, no JVD, no crepitus, no ecchymosis, no hematoma, s/p anterior cervical discectomy  Chest: No gross rib or sternal pathology or tenderness to exam, no crepitus, no ecchymosis, no hematoma  Resp: CTAB  CVS: S1S2(+)  ABD: bowel sounds (+), soft, nontender, non distended, no rebound, no guarding, no rigidity, no pelvic instability to exam  EXT:  Pt has palpable b/l radial, femoral, dorsalis pedis pulses. All digits are warm and well perfused. No gross long bone pathology or tenderness to exam. Pt demonstrates diminished right upper>left upper ext strength 4/5, left lower ext strength of 4/5. Pt has good capillary refill to digits, no calf edema or tenderness to exam  Skin: no adverse skin changes to exam
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
## 2017-08-09 NOTE — PROGRESS NOTE ADULT - SUBJECTIVE AND OBJECTIVE BOX
WHITLEY CHEEMA    72y     Male     MR#[id number]    HPI:Pt is a 72 year old man with a PMH of Epilepsy, Diabetes, and HTN who presented to  ED after being found on the ground outside the [facility] in [place]. As per patient he got up this AM and didn't feel 100% himself, he went to the eye doctor and had is right eye dilated for an exam, after the appointment he was walking to the [facility] when he became dizzy and fell. Pt cannot recall if he had a seizure, tripped and fell, or his dizziness caused him to pass out and fall. Pt was evaluated by EMS and transported to [facility]. Pt c/o bilateral arm pain and weakness. He also c/o b/ lower ext weakness but not as pronounced as the b/l upper extremities.  Pt denies incontinence or tongue biting. Pt denies nausea or vomiting.     CT of the cervical spine done in the ED shows cord impingement is seen at C3-4 through C7-T1 on a degenerative basis.    8/8- Pt seen and examined in bed, upper extremities still weak with a burning pain although improved from yesterday, cervical collar in place, denies dizziness, headache, neck pain, or back pain, set for OR later today     8/9- POD #1, s/p ACDF C4/5 and C5/6, pt seen and examined with Dr. Alvarez on am rounds, pt c/o mild post-op neck pain, continued burning sensation b/l upper ext, but improved, Hemovac 20cc since surgery,     Vital Signs Last 24 Hrs  T(C): 36.8 (09 Aug 2017 08:29), Max: 36.9 (08 Aug 2017 21:20)  T(F): 98.3 (09 Aug 2017 08:29), Max: 98.4 (08 Aug 2017 21:20)  HR: 74 (09 Aug 2017 08:00) (59 - 83)  BP: 141/64 (09 Aug 2017 08:00) (132/66 - 175/78)  BP(mean): 83 (09 Aug 2017 08:00) (82 - 107)  RR: 12 (09 Aug 2017 08:00) (8 - 20)  SpO2: 97% (09 Aug 2017 08:00) (97% - 100%)    MEDS:  MEDICATIONS  (STANDING):  atorvastatin 20 milliGRAM(s) Oral at bedtime  enalapril 5 milliGRAM(s) Oral daily  gabapentin 100 milliGRAM(s) Oral every 8 hours  insulin lispro (HumaLOG) corrective regimen sliding scale   SubCutaneous every 6 hours  lacosamide 50 milliGRAM(s) Oral two times a day  levETIRAcetam 750 milliGRAM(s) Oral two times a day  levothyroxine 75 MICROGram(s) Oral daily  metoprolol 50 milliGRAM(s) Oral two times a day  multivitamin 1 Tablet(s) Oral daily  OXcarbazepine 300 milliGRAM(s) Oral two times a day  pantoprazole    Tablet 40 milliGRAM(s) Oral before breakfast  ceFAZolin   IVPB 1000 milliGRAM(s) IV Intermittent every 8 hours    MEDICATIONS  (PRN):  dextrose Gel 1 Dose(s) Oral once PRN Blood Glucose LESS THAN 70 milliGRAM(s)/deciliter  glucagon  Injectable 1 milliGRAM(s) IntraMuscular once PRN Glucose LESS THAN 70 milligrams/deciliter  ondansetron Injectable 4 milliGRAM(s) IV Push every 6 hours PRN Nausea  HYDROmorphone  Injectable 1 milliGRAM(s) SubCutaneous every 6 hours PRN Moderate Pain  HYDROmorphone   Tablet 1 milliGRAM(s) Oral every 2 hours PRN Moderate Pain (4 - 6)    PHYSICAL EXAM:  Constitutional: awake and alert.  HEENT: PERRLA, EOMI, multiple facial lacerations  Neck: Cervical collar in place, drain in place, dressing clean and dry   Respiratory: Breath sounds are clear bilaterally  Cardiovascular: S1 and S2, regular rhythm  Gastrointestinal: soft, nontender  Extremities:  no edema  Musculoskeletal: no joint swelling/tenderness, no abnormal movements  Skin: No rashes    Neurological exam:  HF: A x O x 3. Appropriately interactive, normal affect- improved from yesterday . Speech fluent, No Aphasia or paraphasic errors. Naming /repetition intact   CN: PERRL, EOMI, VFF, facial sensation normal, no NLFD, tongue midline   Motor: +weakness b/l upper extremities 4/5, weaker in triceps and decreased  strength, B/L lower extrem 4/5  Sens: continued decreased sensation to pin prick b/l upper ext but improved   Reflexes: Symmetric and brisk   Coord:  difficult to assess due to weakness   Gait/Balance: Not tested       LABS:                        14.6   11.5  )-----------( 193      ( 09 Aug 2017 06:08 )             41.6     08-09    140  |  105  |  19  ----------------------------<  233<H>  4.7   |  27  |  1.31<H>    Ca    8.6      09 Aug 2017 06:08    TPro  6.2  /  Alb  3.1<L>  /  TBili  0.5  /  DBili  x   /  AST  26  /  ALT  26  /  AlkPhos  91  08-07    PT/INR - ( 07 Aug 2017 12:11 )   PT: 11.1 sec;   INR: 1.03 ratio      PTT - ( 07 Aug 2017 12:11 )  PTT:31.0 sec

## 2017-08-09 NOTE — PHYSICAL THERAPY INITIAL EVALUATION ADULT - PLANNED THERAPY INTERVENTIONS, PT EVAL
ADLs/bed mobility training/transfer training/motor coordination training/strengthening/gait training/ROM
transfer training/balance training/gait training/strengthening/bed mobility training

## 2017-08-09 NOTE — PHYSICAL THERAPY INITIAL EVALUATION ADULT - FUNCTIONAL LIMITATIONS, PT EVAL
community/leisure/self-care/work/home management
self-care/patient will not be able to attend to BADLs at home alone./home management

## 2017-08-09 NOTE — PROGRESS NOTE ADULT - ASSESSMENT
This is a 72 y/o M with a hx of PMHx notable for HTN, hyperlipidemia, vertebral artery dissection, DM type II, CKD III, asthma, and seizures on anti-epileptics admitted for fall found to have acute cervical cord compression.    # Fall  # Acute Cervical Neck Pain  # Acute central cord syndrome in the setting of chronic degenerative disc disease S/p Fall  -Management as per Neurosurgery  -s/p ACDF C4/5 and C5/6 (8/8)  -X-ray of c-spine today to evaluate plate placement   -Cervical collar to be worn at all times  -monitor Hemovac output  -Physical Therapy  - Gabapentin increased to 100mg TID by Neurosurgery.   - cont w/ Pain Control.    # Seizure disorder - cont home meds. Was admitted in Feb for seizure activity at which point began treatment with anti-epileptics. FU trileptal levels; TSH WNL    # HTN - normotensive, cont meds including Metoprolol and Enalpril. maintain SBP<150    # Type 2 DM - mildly uncontrolled, A1c 7.8%. Will switch to ISS q6hrs. resume oral antihyperglycemic meds on discharge    # HLD - cont statin.     Thank you for consult, will continue to follow.

## 2017-08-09 NOTE — PHYSICAL THERAPY INITIAL EVALUATION ADULT - MODALITIES TREATMENT COMMENTS
pt with BUE (R>>L) clumbiness,weakness with loss of fine motor dexterity both hands,unable to use small cell phone (has free flip phone) and at risk of "flopping" harish. R hand into face/eye/nose due to tricep weakness,cautioned to keep hands away from face for now ,lacks precision/accuity due to decreased strength,coordination; pt notes he has had trouble picking up small items with R hand (ie-bits of french fries)
patient left out of bed in chair with CBIR, chair alarm in use.  Improved strength and control since physical therapy eval prior to sx.

## 2017-08-09 NOTE — PROGRESS NOTE ADULT - SUBJECTIVE AND OBJECTIVE BOX
Discussion with Neurosurgery PA- deferring anticoagulation to their service as it will not be resumed until drain dc'ed and patient considered "stable." Will sign off.   Please do not hesitate to reconsult.

## 2017-08-09 NOTE — PROGRESS NOTE ADULT - SUBJECTIVE AND OBJECTIVE BOX
CC: fall and arm weakness    HPI: This is a 72 y/o M with a hx of PMHx notable for HTN, hyperlipidemia, vertebral artery dissection, DM type II, CKD III, asthma, and seizures on anti-epileptics admitted for fall found to have acute cervical cord compression. Patient has been suffering from blurred vision from retina issues in the right eye and had been following up with an Opthomologist regularly. On 8/7/17, he had a dilated eye exam and upon ambulating felt dizziness and subsequently fell (unwitnessed w/ + LOC). No reported tongue biting, shaking, headaches or urinary incontinence. Upon evaluation in the ED, patient reported acute bilateral UE pain and weakness. CT and MRI of the CSpine revealed bilateral severe central spinal canal stenosis at C4/5 extending to C6/C7. No acute fractures. Patient has been admitted to the Trauma service with medicine consulted for comanagement and is awaiting neurosurgery decompression today.     Seen c/o bilateral arm pain, tender to touch. Able to lift arms moreso today than yesterday but with pain. No HA / CP or SOB. Complains of mild leg weakness.     8/9:  No overnight events/ S/p Surgery yesterday    ROS: negative unless stated above.     ICU Vital Signs Last 24 Hrs  T(C): 36.8 (09 Aug 2017 08:29), Max: 36.9 (08 Aug 2017 21:20)  T(F): 98.3 (09 Aug 2017 08:29), Max: 98.4 (08 Aug 2017 21:20)  HR: 71 (09 Aug 2017 12:00) (61 - 83)  BP: 128/59 (09 Aug 2017 12:00) (128/59 - 175/78)  BP(mean): 77 (09 Aug 2017 12:00) (77 - 107)  ABP: --  ABP(mean): --  RR: 23 (09 Aug 2017 12:00) (8 - 23)  SpO2: 98% (09 Aug 2017 12:00) (96% - 100%)    PHYSICAL EXAM:  Constitutional: NAD, awake and alert, well-developed middle aged male in hard C - collar.   HEENT: PERRLA however with notable facial brusing along nasal bridge. Mild Right periorbital edema and ecchymosis. Dried blood on face.   Neck: Soft and supple, No LAD, No JVD  Respiratory: Breath sounds are clear bilaterally, No wheezing, rales or rhonchi  Cardiovascular: S1 and S2, regular rate and rhythm, no Murmurs, gallops or rubs  Gastrointestinal: Bowel Sounds present, soft, nontender, nondistended, no guarding, no rebound  Extremities: No peripheral edema  Vascular: 2+ peripheral pulses  Neurological: A/O x 3, deficits of muscle strength.   Musculoskeletal: 4/5 muscle strength throughout both upper and lower extremities.   Skin: bruises      MEDICATIONS  (STANDING):  gabapentin 100 milliGRAM(s) Oral every 8 hours  pantoprazole    Tablet 40 milliGRAM(s) Oral before breakfast  heparin  Injectable 5000 Unit(s) SubCutaneous every 12 hours  sodium chloride 0.9%. 1000 milliLiter(s) (100 mL/Hr) IV Continuous <Continuous>  multivitamin 1 Tablet(s) Oral daily  dextrose 5%. 1000 milliLiter(s) (50 mL/Hr) IV Continuous <Continuous>  dextrose 50% Injectable 12.5 Gram(s) IV Push once  dextrose 50% Injectable 25 Gram(s) IV Push once  dextrose 50% Injectable 25 Gram(s) IV Push once  enalapril 5 milliGRAM(s) Oral daily  levETIRAcetam 750 milliGRAM(s) Oral two times a day  OXcarbazepine 300 milliGRAM(s) Oral two times a day  lacosamide 50 milliGRAM(s) Oral two times a day  atorvastatin 20 milliGRAM(s) Oral at bedtime  metoprolol 50 milliGRAM(s) Oral two times a day  levothyroxine 75 MICROGram(s) Oral daily  insulin lispro (HumaLOG) corrective regimen sliding scale   SubCutaneous every 6 hours      LABS: All Labs Reviewed:                        12.8   11.2  )-----------( 202      ( 08 Aug 2017 05:35 )             35.4     08-08    136  |  104  |  22  ----------------------------<  183<H>  4.1   |  23  |  1.32<H>    Ca    8.4<L>      08 Aug 2017 05:35    TPro  6.2  /  Alb  3.1<L>  /  TBili  0.5  /  DBili  x   /  AST  26  /  ALT  26  /  AlkPhos  91  08-07    PT/INR - ( 07 Aug 2017 12:11 )   PT: 11.1 sec;   INR: 1.03 ratio         PTT - ( 07 Aug 2017 12:11 )  PTT:31.0 sec  CARDIAC MARKERS ( 07 Aug 2017 12:11 )  <0.015 ng/mL / x     / x     / x     / x          MRI Cervical Spine w/o Cont (08.07.17 @ 16:08) IMPRESSION:     Multilevel severe degenerative changes of cervical spine as detailed   above, with severe central spinal canal stenosis at C4/C5 and C5/C6, and   moderate to severe narrowing of the left neural foramen at C3/C4, severe   bilateral foraminal stenosis at C4/C5, severe bilateral foraminal   stenosis at C5/C6, and moderate to severe bilateral foraminal stenosis at   C6/C7. These findings are not significantly changed since prior exam.    Minimal T2 hyperintense signal within the cervical cord at the C5 level,   not definitively present on the prior exam, may reflect a small amount of   cord edema versus gliosis.      CT Head No Cont (08.07.17 @ 12:33) >  IMPRESSION:   Unchanged calcified venous angioma in the LEFT parietal   lobe.  Atrophy of the LEFT hemisphere is again noted. RIGHT periorbital soft tissue swelling.        EKG: NSR HR 65, QTc 434, no acute ST segment changes.

## 2017-08-10 ENCOUNTER — TRANSCRIPTION ENCOUNTER (OUTPATIENT)
Age: 72
End: 2017-08-10

## 2017-08-10 VITALS
OXYGEN SATURATION: 100 % | HEART RATE: 71 BPM | SYSTOLIC BLOOD PRESSURE: 170 MMHG | TEMPERATURE: 98 F | DIASTOLIC BLOOD PRESSURE: 74 MMHG | RESPIRATION RATE: 18 BRPM

## 2017-08-10 LAB
ANION GAP SERPL CALC-SCNC: 8 MMOL/L — SIGNIFICANT CHANGE UP (ref 5–17)
BASOPHILS # BLD AUTO: 0.1 K/UL — SIGNIFICANT CHANGE UP (ref 0–0.2)
BASOPHILS NFR BLD AUTO: 1 % — SIGNIFICANT CHANGE UP (ref 0–2)
BUN SERPL-MCNC: 18 MG/DL — SIGNIFICANT CHANGE UP (ref 7–23)
CALCIUM SERPL-MCNC: 8.2 MG/DL — LOW (ref 8.5–10.1)
CHLORIDE SERPL-SCNC: 106 MMOL/L — SIGNIFICANT CHANGE UP (ref 96–108)
CO2 SERPL-SCNC: 26 MMOL/L — SIGNIFICANT CHANGE UP (ref 22–31)
CREAT SERPL-MCNC: 1.3 MG/DL — SIGNIFICANT CHANGE UP (ref 0.5–1.3)
EOSINOPHIL # BLD AUTO: 0 K/UL — SIGNIFICANT CHANGE UP (ref 0–0.5)
EOSINOPHIL NFR BLD AUTO: 0.3 % — SIGNIFICANT CHANGE UP (ref 0–6)
GLUCOSE SERPL-MCNC: 266 MG/DL — HIGH (ref 70–99)
HCT VFR BLD CALC: 33.8 % — LOW (ref 39–50)
HGB BLD-MCNC: 12 G/DL — LOW (ref 13–17)
LYMPHOCYTES # BLD AUTO: 1.3 K/UL — SIGNIFICANT CHANGE UP (ref 1–3.3)
LYMPHOCYTES # BLD AUTO: 14 % — SIGNIFICANT CHANGE UP (ref 13–44)
MCHC RBC-ENTMCNC: 30.6 PG — SIGNIFICANT CHANGE UP (ref 27–34)
MCHC RBC-ENTMCNC: 35.5 GM/DL — SIGNIFICANT CHANGE UP (ref 32–36)
MCV RBC AUTO: 86.3 FL — SIGNIFICANT CHANGE UP (ref 80–100)
MONOCYTES # BLD AUTO: 0.9 K/UL — SIGNIFICANT CHANGE UP (ref 0–0.9)
MONOCYTES NFR BLD AUTO: 9.8 % — SIGNIFICANT CHANGE UP (ref 2–14)
NEUTROPHILS # BLD AUTO: 6.7 K/UL — SIGNIFICANT CHANGE UP (ref 1.8–7.4)
NEUTROPHILS NFR BLD AUTO: 75 % — SIGNIFICANT CHANGE UP (ref 43–77)
PLATELET # BLD AUTO: 183 K/UL — SIGNIFICANT CHANGE UP (ref 150–400)
POTASSIUM SERPL-MCNC: 4.1 MMOL/L — SIGNIFICANT CHANGE UP (ref 3.5–5.3)
POTASSIUM SERPL-SCNC: 4.1 MMOL/L — SIGNIFICANT CHANGE UP (ref 3.5–5.3)
RBC # BLD: 3.91 M/UL — LOW (ref 4.2–5.8)
RBC # FLD: 11.6 % — SIGNIFICANT CHANGE UP (ref 10.3–14.5)
SODIUM SERPL-SCNC: 140 MMOL/L — SIGNIFICANT CHANGE UP (ref 135–145)
WBC # BLD: 8.9 K/UL — SIGNIFICANT CHANGE UP (ref 3.8–10.5)
WBC # FLD AUTO: 8.9 K/UL — SIGNIFICANT CHANGE UP (ref 3.8–10.5)

## 2017-08-10 RX ORDER — GABAPENTIN 400 MG/1
1 CAPSULE ORAL
Qty: 0 | Refills: 0 | DISCHARGE
Start: 2017-08-10

## 2017-08-10 RX ORDER — OXYCODONE HYDROCHLORIDE 5 MG/1
1 TABLET ORAL
Qty: 0 | Refills: 0 | DISCHARGE
Start: 2017-08-10

## 2017-08-10 RX ORDER — DEXTROSE 50 % IN WATER 50 %
25 SYRINGE (ML) INTRAVENOUS ONCE
Qty: 0 | Refills: 0 | Status: DISCONTINUED | OUTPATIENT
Start: 2017-08-10 | End: 2017-08-10

## 2017-08-10 RX ORDER — LACOSAMIDE 50 MG/1
1 TABLET ORAL
Qty: 0 | Refills: 0 | DISCHARGE
Start: 2017-08-10

## 2017-08-10 RX ORDER — HYDROMORPHONE HYDROCHLORIDE 2 MG/ML
1 INJECTION INTRAMUSCULAR; INTRAVENOUS; SUBCUTANEOUS EVERY 6 HOURS
Qty: 0 | Refills: 0 | Status: DISCONTINUED | OUTPATIENT
Start: 2017-08-10 | End: 2017-08-10

## 2017-08-10 RX ORDER — INSULIN LISPRO 100/ML
VIAL (ML) SUBCUTANEOUS
Qty: 0 | Refills: 0 | Status: DISCONTINUED | OUTPATIENT
Start: 2017-08-10 | End: 2017-08-10

## 2017-08-10 RX ORDER — ACETAMINOPHEN 500 MG
2 TABLET ORAL
Qty: 0 | Refills: 0 | DISCHARGE
Start: 2017-08-10

## 2017-08-10 RX ORDER — DEXTROSE 50 % IN WATER 50 %
1 SYRINGE (ML) INTRAVENOUS ONCE
Qty: 0 | Refills: 0 | Status: DISCONTINUED | OUTPATIENT
Start: 2017-08-10 | End: 2017-08-10

## 2017-08-10 RX ORDER — DEXTROSE 50 % IN WATER 50 %
12.5 SYRINGE (ML) INTRAVENOUS ONCE
Qty: 0 | Refills: 0 | Status: DISCONTINUED | OUTPATIENT
Start: 2017-08-10 | End: 2017-08-10

## 2017-08-10 RX ORDER — OXYCODONE HYDROCHLORIDE 5 MG/1
5 TABLET ORAL EVERY 4 HOURS
Qty: 0 | Refills: 0 | Status: DISCONTINUED | OUTPATIENT
Start: 2017-08-10 | End: 2017-08-10

## 2017-08-10 RX ORDER — GLUCAGON INJECTION, SOLUTION 0.5 MG/.1ML
1 INJECTION, SOLUTION SUBCUTANEOUS ONCE
Qty: 0 | Refills: 0 | Status: DISCONTINUED | OUTPATIENT
Start: 2017-08-10 | End: 2017-08-10

## 2017-08-10 RX ORDER — SODIUM CHLORIDE 9 MG/ML
1000 INJECTION, SOLUTION INTRAVENOUS
Qty: 0 | Refills: 0 | Status: DISCONTINUED | OUTPATIENT
Start: 2017-08-10 | End: 2017-08-10

## 2017-08-10 RX ORDER — LEVOTHYROXINE SODIUM 125 MCG
1 TABLET ORAL
Qty: 0 | Refills: 0 | DISCHARGE
Start: 2017-08-10

## 2017-08-10 RX ORDER — INSULIN LISPRO 100/ML
VIAL (ML) SUBCUTANEOUS AT BEDTIME
Qty: 0 | Refills: 0 | Status: DISCONTINUED | OUTPATIENT
Start: 2017-08-10 | End: 2017-08-10

## 2017-08-10 RX ADMIN — OXCARBAZEPINE 300 MILLIGRAM(S): 300 TABLET, FILM COATED ORAL at 06:12

## 2017-08-10 RX ADMIN — Medication 12: at 11:25

## 2017-08-10 RX ADMIN — GABAPENTIN 100 MILLIGRAM(S): 400 CAPSULE ORAL at 06:12

## 2017-08-10 RX ADMIN — LACOSAMIDE 50 MILLIGRAM(S): 50 TABLET ORAL at 06:12

## 2017-08-10 RX ADMIN — Medication 75 MICROGRAM(S): at 06:12

## 2017-08-10 RX ADMIN — GABAPENTIN 100 MILLIGRAM(S): 400 CAPSULE ORAL at 14:01

## 2017-08-10 RX ADMIN — Medication 100 MILLIGRAM(S): at 00:46

## 2017-08-10 RX ADMIN — LEVETIRACETAM 750 MILLIGRAM(S): 250 TABLET, FILM COATED ORAL at 06:12

## 2017-08-10 RX ADMIN — Medication 1 TABLET(S): at 14:01

## 2017-08-10 RX ADMIN — Medication 3: at 00:46

## 2017-08-10 RX ADMIN — Medication 5 MILLIGRAM(S): at 06:12

## 2017-08-10 RX ADMIN — Medication 50 MILLIGRAM(S): at 06:12

## 2017-08-10 RX ADMIN — PANTOPRAZOLE SODIUM 40 MILLIGRAM(S): 20 TABLET, DELAYED RELEASE ORAL at 06:12

## 2017-08-10 RX ADMIN — Medication 3: at 06:16

## 2017-08-10 NOTE — DISCHARGE NOTE ADULT - MEDICATION SUMMARY - MEDICATIONS TO TAKE
I will START or STAY ON the medications listed below when I get home from the hospital:    acetaminophen 325 mg oral tablet  -- 2 tab(s) by mouth every 6 hours, As needed, Mild Pain (1 - 3)  -- Indication: For mild pain    oxyCODONE 5 mg oral tablet  -- 1 tab(s) by mouth every 4 hours, As needed, Moderate Pain (4 - 6)  -- Indication: For moderate pain    enalapril 5 mg oral tablet  -- 1 tab(s) by mouth once a day  -- Indication: For HTN (hypertension)    levETIRAcetam 750 mg oral tablet  -- 2 tab(s) by mouth 2 times a day  -- Indication: For Epilepsy    Vimpat 50 mg oral tablet  -- 1 tab(s) by mouth 2 times a day  -- Indication: For Epilepsy    Neurontin 100 mg oral capsule  -- 1 cap(s) by mouth every 8 hours  -- Indication: For neuropathic pain    Trileptal 150 mg oral tablet  -- 3 tab(s) by mouth 2 times a day  -- Indication: For Epilepsy    GlipiZIDE XL 5 mg oral tablet, extended release  -- 1 tab(s) by mouth 2 times a day  -- Indication: For Diabetes    Lipitor 20 mg oral tablet  -- 1 tab(s) by mouth once a day (at bedtime)  -- Indication: For Dyslipidemia    metoprolol tartrate 50 mg oral tablet  -- 1 tab(s) by mouth 2 times a day  -- Indication: For HTN (hypertension)    omeprazole 20 mg oral delayed release capsule  -- 1 cap(s) by mouth once a day  -- Indication: For GERD    levothyroxine 75 mcg (0.075 mg) oral tablet  -- 1 tab(s) by mouth once a day  -- Indication: For Hypothyroid    Daily Multiple Vitamins oral tablet  -- 1 tab(s) by mouth once a day  -- Indication: For Supplement

## 2017-08-10 NOTE — DISCHARGE NOTE ADULT - HOSPITAL COURSE
Pt is a 72 year old man with a PMH of Epilepsy, Diabetes, and HTN who presented to  ED after being found on the ground outside the [facility] in [place]. As per patient he got up this AM and didn't feel 100% himself, he went to the eye doctor and had is right eye dilated for an exam, after the appointment he was walking to the [facility] when he became dizzy and fell. Pt cannot recall if he had a seizure, tripped and fell, or his dizziness caused him to pass out and fall. Pt was evaluated by EMS and transported to [facility]. Pt c/o bilateral arm pain and weakness. He also c/o b/ lower ext weakness but not as pronounced as the b/l upper extremities.  Pt denies incontinence or tongue biting. Pt denies nausea or vomiting.     CT of the cervical spine done in the ED shows cord impingement is seen at C3-4 through C7-T1 on a degenerative basis.    8/8- Pt seen and examined in bed, upper extremities still weak with a burning pain although improved from yesterday, cervical collar in place, denies dizziness, headache, neck pain, or back pain, set for OR later today     8/9- POD #1, s/p ACDF C4/5 and C5/6, pt seen and examined with Dr. Alvarez on am rounds, pt c/o mild post-op neck pain, continued burning sensation b/l upper ext, but improved, Hemovac 20cc since surgery,     8/10- POD#2. Pt seen and examined, in NAD. Appears comfortable in bed, b/l UE "burning" pain significantly improved. Drsg flat and dry. Persistent b/l UE weakness unchanged. Stable for discharge to rehab Pt is a 72 year old man with a PMH of Epilepsy, Diabetes, and HTN who presented to  ED after being found on the ground outside the [facility] in [place]. As per patient he got up this AM and didn't feel 100% himself, he went to the eye doctor and had is right eye dilated for an exam, after the appointment he was walking to the [facility] when he became dizzy and fell. Pt cannot recall if he had a seizure, tripped and fell, or his dizziness caused him to pass out and fall. Pt was evaluated by EMS and transported to [facility]. Pt c/o bilateral arm pain and weakness. He also c/o b/ lower ext weakness but not as pronounced as the b/l upper extremities.  Pt denies incontinence or tongue biting. Pt denies nausea or vomiting.     CT of the cervical spine done in the ED showed generalized degenerative changes. A subsequent MRI showed critical stenosis at the levels of C4/5 and C5/6.     8/8- Pt seen and examined in bed, upper extremities still weak with a burning pain although improved from yesterday, cervical collar in place, denies dizziness, headache, neck pain, or back pain, set for OR later today     8/9- POD #1, s/p ACDF C4/5 and C5/6, pt seen and examined with Dr. Alvarez on am rounds, pt c/o mild post-op neck pain, continued burning sensation b/l upper ext, but improved, Hemovac 20cc since surgery,     8/10- POD#2. Pt seen and examined, in NAD. Appears comfortable in bed, b/l UE "burning" pain significantly improved. Drsg flat and dry. Persistent b/l UE weakness unchanged. Stable for discharge to rehab

## 2017-08-10 NOTE — PROGRESS NOTE ADULT - SUBJECTIVE AND OBJECTIVE BOX
CC: fall and arm weakness    HPI: This is a 72 y/o M with a hx of PMHx notable for HTN, hyperlipidemia, vertebral artery dissection, DM type II, CKD III, asthma, and seizures on anti-epileptics admitted for fall found to have acute cervical cord compression. Patient has been suffering from blurred vision from retina issues in the right eye and had been following up with an Opthomologist regularly. On 8/7/17, he had a dilated eye exam and upon ambulating felt dizziness and subsequently fell (unwitnessed w/ + LOC). No reported tongue biting, shaking, headaches or urinary incontinence. Upon evaluation in the ED, patient reported acute bilateral UE pain and weakness. CT and MRI of the CSpine revealed bilateral severe central spinal canal stenosis at C4/5 extending to C6/C7. No acute fractures. Patient has been admitted to the Trauma service with medicine consulted for comanagement and is awaiting neurosurgery decompression today.     Seen c/o bilateral arm pain, tender to touch. Able to lift arms moreso today than yesterday but with pain. No HA / CP or SOB. Complains of mild leg weakness.     8/9:  No overnight events/ S/p Surgery yesterday  8/10: No overnight events. Patient has no complaints. No numbness or tingling, No CP or dyspnea    ROS: negative unless stated above.     ICU Vital Signs Last 24 Hrs  T(C): 36.7 (10 Aug 2017 14:08), Max: 37.2 (09 Aug 2017 21:14)  T(F): 98.1 (10 Aug 2017 14:08), Max: 98.9 (09 Aug 2017 21:14)  HR: 68 (10 Aug 2017 14:08) (68 - 81)  BP: 154/70 (10 Aug 2017 14:08) (136/66 - 169/75)  BP(mean): 83 (10 Aug 2017 09:00) (74 - 100)  ABP: --  ABP(mean): --  RR: 18 (10 Aug 2017 14:08) (12 - 21)  SpO2: 100% (10 Aug 2017 14:08) (95% - 100%)      PHYSICAL EXAM:  Constitutional: NAD, awake and alert, well-developed middle aged male in hard C - collar.   HEENT: PERRLA however with notable facial brusing along nasal bridge. Mild Right periorbital edema and ecchymosis.    Neck: Soft and supple, No LAD, No JVD  Respiratory: Breath sounds are clear bilaterally, No wheezing, rales or rhonchi  Cardiovascular: S1 and S2, regular rate and rhythm, no Murmurs, gallops or rubs  Gastrointestinal: Bowel Sounds present, soft, nontender, nondistended, no guarding, no rebound  Extremities: No peripheral edema  Vascular: 2+ peripheral pulses  Neurological: A/O x 3, deficits of muscle strength.   Musculoskeletal: 4/5 muscle strength throughout both upper and lower extremities.   Skin: bruises      MEDICATIONS  (STANDING):  gabapentin 100 milliGRAM(s) Oral every 8 hours  pantoprazole    Tablet 40 milliGRAM(s) Oral before breakfast  heparin  Injectable 5000 Unit(s) SubCutaneous every 12 hours  sodium chloride 0.9%. 1000 milliLiter(s) (100 mL/Hr) IV Continuous <Continuous>  multivitamin 1 Tablet(s) Oral daily  dextrose 5%. 1000 milliLiter(s) (50 mL/Hr) IV Continuous <Continuous>  dextrose 50% Injectable 12.5 Gram(s) IV Push once  dextrose 50% Injectable 25 Gram(s) IV Push once  dextrose 50% Injectable 25 Gram(s) IV Push once  enalapril 5 milliGRAM(s) Oral daily  levETIRAcetam 750 milliGRAM(s) Oral two times a day  OXcarbazepine 300 milliGRAM(s) Oral two times a day  lacosamide 50 milliGRAM(s) Oral two times a day  atorvastatin 20 milliGRAM(s) Oral at bedtime  metoprolol 50 milliGRAM(s) Oral two times a day  levothyroxine 75 MICROGram(s) Oral daily  insulin lispro (HumaLOG) corrective regimen sliding scale   SubCutaneous every 6 hours      LABS: All Labs Reviewed:                        12.8   11.2  )-----------( 202      ( 08 Aug 2017 05:35 )             35.4     08-08    136  |  104  |  22  ----------------------------<  183<H>  4.1   |  23  |  1.32<H>    Ca    8.4<L>      08 Aug 2017 05:35    TPro  6.2  /  Alb  3.1<L>  /  TBili  0.5  /  DBili  x   /  AST  26  /  ALT  26  /  AlkPhos  91  08-07    PT/INR - ( 07 Aug 2017 12:11 )   PT: 11.1 sec;   INR: 1.03 ratio         PTT - ( 07 Aug 2017 12:11 )  PTT:31.0 sec  CARDIAC MARKERS ( 07 Aug 2017 12:11 )  <0.015 ng/mL / x     / x     / x     / x          MRI Cervical Spine w/o Cont (08.07.17 @ 16:08) IMPRESSION:     Multilevel severe degenerative changes of cervical spine as detailed   above, with severe central spinal canal stenosis at C4/C5 and C5/C6, and   moderate to severe narrowing of the left neural foramen at C3/C4, severe   bilateral foraminal stenosis at C4/C5, severe bilateral foraminal   stenosis at C5/C6, and moderate to severe bilateral foraminal stenosis at   C6/C7. These findings are not significantly changed since prior exam.    Minimal T2 hyperintense signal within the cervical cord at the C5 level,   not definitively present on the prior exam, may reflect a small amount of   cord edema versus gliosis.      CT Head No Cont (08.07.17 @ 12:33) >  IMPRESSION:   Unchanged calcified venous angioma in the LEFT parietal   lobe.  Atrophy of the LEFT hemisphere is again noted. RIGHT periorbital soft tissue swelling.        EKG: NSR HR 65, QTc 434, no acute ST segment changes.

## 2017-08-10 NOTE — PROGRESS NOTE ADULT - PROBLEM SELECTOR PLAN 3
sliding scale insulin while in hospital  will resume oral antihyperglycemic meds on discharge  diabetic diet
sliding scale insulin while in hospital  will resume oral antihyperglycemic meds on discharge  diabetic diet

## 2017-08-10 NOTE — PROGRESS NOTE ADULT - ASSESSMENT
This is a 70 y/o M with a hx of PMHx notable for HTN, hyperlipidemia, vertebral artery dissection, DM type II, CKD III, asthma, and seizures on anti-epileptics admitted for fall found to have acute cervical cord compression.    # Fall  # Acute Cervical Neck Pain  # Acute central cord syndrome in the setting of chronic degenerative disc disease S/p Fall  -Management as per Neurosurgery  -s/p ACDF C4/5 and C5/6 (8/8)  -X-ray of c-spine (8/9) no malalignment  -Cervical collar to be worn at all times  -Physical Therapy  -Gabapentin increased to 100mg TID by Neurosurgery.   -cont w/ Pain Control.    # Seizure disorder - cont home meds. Was admitted in Feb for seizure activity at which point began treatment with anti-epileptics. FU trileptal levels; TSH WNL; FU with Neuro Dr melgar as outpatient    # HTN - cont meds including Metoprolol and Enalpril. maintain SBP<150    # Type 2 DM - mildly uncontrolled, A1c 7.8%. monitor QAC and QHS,, continue ISS. resume oral antihyperglycemic meds on discharge    # HLD - cont statin.     Thank you for consult, will continue to follow.

## 2017-08-10 NOTE — PROGRESS NOTE ADULT - PROBLEM SELECTOR PLAN 1
s/p  ACDF C4-5 and C5-6  Advance diet and activity as tolerated   Cervical collar to be worn at all times  Physical Therapy  Transfer to [address] [place] [address] for discharge planning
s/p  ACDF C4-5 and C5-6  Advance diet and activity as tolerated   X-ray of c-spine today to evaluate plate placement   Cervical collar to be worn at all times  monitor Hemovac output  Physical Therapy  Social work for discharge planning
Pt set for OR later today, ACDF C4-5 and C5-6  NPO except meds  Hospitalist Note appreciated, medical clearance  Adjustable Coller ordered  Continue seizure medications

## 2017-08-10 NOTE — DISCHARGE NOTE ADULT - ADDITIONAL INSTRUCTIONS
You may shower, incision may get wet, do not let water beat directly on incision. You must wear your collar at ALL TIMES. Gradually increase walking and other activities, no straining or heavy lifting. Follow up in the office in ~2 weeks for inspection of incision. Call the office with any issues

## 2017-08-10 NOTE — DISCHARGE NOTE ADULT - CARE PROVIDER_API CALL
Roberto Alvarez; PhD), Neurosurgery Surgery  [address] [place] [address]  Phone: [phone]  Fax: [phone]

## 2017-08-10 NOTE — DISCHARGE NOTE ADULT - MEDICATION SUMMARY - MEDICATIONS TO CHANGE
I will SWITCH the dose or number of times a day I take the medications listed below when I get home from the hospital:  None no anomalies noted

## 2017-08-10 NOTE — DISCHARGE NOTE ADULT - PLAN OF CARE
Return to normal function Monitor incision for signs of infection including redness, swelling, and discharge. Any new pain, change in current pain character or intensity, new numbness / tingling / weakness, or signs of infection - call Dr Alvarez's office or visit the closest ER. Continue current AEDs  Outpatient neurology follow up Continue oral medications  Follow up with your PMD after discharge to have sugars checked Continue current regimen  Follow up with your PMD after discharge to have vitals checked

## 2017-08-10 NOTE — PROGRESS NOTE ADULT - PROBLEM SELECTOR PLAN 4
continue current medications  Trileptal, Keppra and Lamictal   Follow up with Dr. Baptiste as an out patient
continue current medications  Trileptal, Keppra and Lamictal   Follow up with Dr. Baptiste as an out patient

## 2017-08-10 NOTE — DISCHARGE NOTE ADULT - CARE PLAN
Principal Discharge DX:	Central cord syndrome, initial encounter  Goal:	Return to normal function  Instructions for follow-up, activity and diet:	Monitor incision for signs of infection including redness, swelling, and discharge. Any new pain, change in current pain character or intensity, new numbness / tingling / weakness, or signs of infection - call Dr Alvarez's office or visit the closest ER.  Secondary Diagnosis:	Epilepsy  Instructions for follow-up, activity and diet:	Continue current AEDs  Outpatient neurology follow up  Secondary Diagnosis:	Diabetes  Instructions for follow-up, activity and diet:	Continue oral medications  Follow up with your PMD after discharge to have sugars checked  Secondary Diagnosis:	HTN (hypertension)  Instructions for follow-up, activity and diet:	Continue current regimen  Follow up with your PMD after discharge to have vitals checked

## 2017-08-10 NOTE — DISCHARGE NOTE ADULT - PATIENT PORTAL LINK FT
“You can access the Follow[employer] Patient Portal, offered by [facility], by registering with the following website: [email or web address]”

## 2017-08-11 LAB — OXCARBAZEPINE SERPL-MCNC: 30 UG/ML — SIGNIFICANT CHANGE UP (ref 10–35)
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
## 2017-08-14 DIAGNOSIS — Z90.79 ACQUIRED ABSENCE OF OTHER GENITAL ORGAN(S): ICD-10-CM

## 2017-08-14 DIAGNOSIS — I77.74 DISSECTION OF VERTEBRAL ARTERY: ICD-10-CM

## 2017-08-14 DIAGNOSIS — E11.65 TYPE 2 DIABETES MELLITUS WITH HYPERGLYCEMIA: ICD-10-CM

## 2017-08-14 DIAGNOSIS — E03.9 HYPOTHYROIDISM, UNSPECIFIED: ICD-10-CM

## 2017-08-14 DIAGNOSIS — S14.124A CENTRAL CORD SYNDROME AT C4 LEVEL OF CERVICAL SPINAL CORD, INITIAL ENCOUNTER: ICD-10-CM

## 2017-08-14 DIAGNOSIS — I12.9 HYPERTENSIVE CHRONIC KIDNEY DISEASE WITH STAGE 1 THROUGH STAGE 4 CHRONIC KIDNEY DISEASE, OR UNSPECIFIED CHRONIC KIDNEY DISEASE: ICD-10-CM

## 2017-08-14 DIAGNOSIS — Z88.8 ALLERGY STATUS TO OTHER DRUGS, MEDICAMENTS AND BIOLOGICAL SUBSTANCES STATUS: ICD-10-CM

## 2017-08-14 DIAGNOSIS — Z79.84 LONG TERM (CURRENT) USE OF ORAL HYPOGLYCEMIC DRUGS: ICD-10-CM

## 2017-08-14 DIAGNOSIS — E78.5 HYPERLIPIDEMIA, UNSPECIFIED: ICD-10-CM

## 2017-08-14 DIAGNOSIS — J45.909 UNSPECIFIED ASTHMA, UNCOMPLICATED: ICD-10-CM

## 2017-08-14 DIAGNOSIS — Q85.8 OTHER PHAKOMATOSES, NOT ELSEWHERE CLASSIFIED: ICD-10-CM

## 2017-08-14 DIAGNOSIS — M48.02 SPINAL STENOSIS, CERVICAL REGION: ICD-10-CM

## 2017-08-14 DIAGNOSIS — N18.9 CHRONIC KIDNEY DISEASE, UNSPECIFIED: ICD-10-CM
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
## 2017-08-14 DIAGNOSIS — E11.22 TYPE 2 DIABETES MELLITUS WITH DIABETIC CHRONIC KIDNEY DISEASE: ICD-10-CM

## 2017-08-14 DIAGNOSIS — M50.320 OTHER CERVICAL DISC DEGENERATION, MID-CERVICAL REGION, UNSPECIFIED LEVEL: ICD-10-CM
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
## 2017-08-14 DIAGNOSIS — G40.909 EPILEPSY, UNSPECIFIED, NOT INTRACTABLE, WITHOUT STATUS EPILEPTICUS: ICD-10-CM

## 2017-08-14 DIAGNOSIS — R55 SYNCOPE AND COLLAPSE: ICD-10-CM

## 2017-08-14 DIAGNOSIS — Z79.82 LONG TERM (CURRENT) USE OF ASPIRIN: ICD-10-CM

## 2017-08-15 DIAGNOSIS — N18.3 CHRONIC KIDNEY DISEASE, STAGE 3 (MODERATE): ICD-10-CM

## 2017-09-01 ENCOUNTER — APPOINTMENT (OUTPATIENT)
Dept: NEUROLOGY | Facility: CLINIC | Age: 72
End: 2017-09-01

## 2017-09-06 ENCOUNTER — APPOINTMENT (OUTPATIENT)
Dept: NEUROSURGERY | Facility: CLINIC | Age: 72
End: 2017-09-06
Payer: MEDICARE

## 2017-09-06 VITALS
DIASTOLIC BLOOD PRESSURE: 88 MMHG | HEIGHT: 71 IN | RESPIRATION RATE: 16 BRPM | HEART RATE: 66 BPM | SYSTOLIC BLOOD PRESSURE: 154 MMHG | WEIGHT: 198 LBS | TEMPERATURE: 97.7 F | BODY MASS INDEX: 27.72 KG/M2

## 2017-09-06 PROCEDURE — 99213 OFFICE O/P EST LOW 20 MIN: CPT

## 2017-09-06 PROCEDURE — 99024 POSTOP FOLLOW-UP VISIT: CPT

## 2017-09-15 LAB
ALBUMIN SERPL ELPH-MCNC: 3.9 G/DL
ALP BLD-CCNC: 93 U/L
ALT SERPL-CCNC: 16 U/L
ANION GAP SERPL CALC-SCNC: 13 MMOL/L
AST SERPL-CCNC: 11 U/L
BASOPHILS # BLD AUTO: 0.02 K/UL
BASOPHILS NFR BLD AUTO: 0.3 %
BILIRUB SERPL-MCNC: 0.6 MG/DL
BUN SERPL-MCNC: 21 MG/DL
CALCIUM SERPL-MCNC: 9.1 MG/DL
CHLORIDE SERPL-SCNC: 104 MMOL/L
CO2 SERPL-SCNC: 23 MMOL/L
CREAT SERPL-MCNC: 1.33 MG/DL
EOSINOPHIL # BLD AUTO: 0.28 K/UL
EOSINOPHIL NFR BLD AUTO: 4.2 %
GLUCOSE SERPL-MCNC: 255 MG/DL
HCT VFR BLD CALC: 39.5 %
HGB BLD-MCNC: 13.8 G/DL
IMM GRANULOCYTES NFR BLD AUTO: 0.3 %
LEVETIRACETAM SERPL-MCNC: 55.6 MCG/ML
LYMPHOCYTES # BLD AUTO: 1.16 K/UL
LYMPHOCYTES NFR BLD AUTO: 17.5 %
MAN DIFF?: NORMAL
MCHC RBC-ENTMCNC: 30 PG
MCHC RBC-ENTMCNC: 34.9 GM/DL
MCV RBC AUTO: 85.9 FL
MONOCYTES # BLD AUTO: 0.32 K/UL
MONOCYTES NFR BLD AUTO: 4.8 %
NEUTROPHILS # BLD AUTO: 4.84 K/UL
NEUTROPHILS NFR BLD AUTO: 72.9 %
OXCARBAZEPINE SERPL-MCNC: 23 UG/ML
PLATELET # BLD AUTO: 218 K/UL
POTASSIUM SERPL-SCNC: 5.4 MMOL/L
PROT SERPL-MCNC: 6.3 G/DL
RBC # BLD: 4.6 M/UL
RBC # FLD: 13.2 %
SODIUM SERPL-SCNC: 140 MMOL/L
WBC # FLD AUTO: 6.64 K/UL

## 2017-09-17 ENCOUNTER — FORM ENCOUNTER (OUTPATIENT)
Age: 72
End: 2017-09-17

## 2017-09-18 ENCOUNTER — OUTPATIENT (OUTPATIENT)
Dept: OUTPATIENT SERVICES | Facility: HOSPITAL | Age: 72
LOS: 1 days | End: 2017-09-18
Payer: MEDICARE

## 2017-09-18 ENCOUNTER — APPOINTMENT (OUTPATIENT)
Dept: CT IMAGING | Facility: CLINIC | Age: 72
End: 2017-09-18
Payer: MEDICARE

## 2017-09-18 DIAGNOSIS — Z00.8 ENCOUNTER FOR OTHER GENERAL EXAMINATION: ICD-10-CM

## 2017-09-18 DIAGNOSIS — Z98.89 OTHER SPECIFIED POSTPROCEDURAL STATES: Chronic | ICD-10-CM

## 2017-09-18 PROCEDURE — 76376 3D RENDER W/INTRP POSTPROCES: CPT | Mod: 26

## 2017-09-18 PROCEDURE — 72125 CT NECK SPINE W/O DYE: CPT | Mod: 26

## 2017-09-18 PROCEDURE — 76376 3D RENDER W/INTRP POSTPROCES: CPT

## 2017-09-18 PROCEDURE — 72125 CT NECK SPINE W/O DYE: CPT

## 2017-09-20 ENCOUNTER — APPOINTMENT (OUTPATIENT)
Dept: NEUROSURGERY | Facility: CLINIC | Age: 72
End: 2017-09-20
Payer: MEDICARE

## 2017-09-20 VITALS
HEIGHT: 70.7 IN | WEIGHT: 196 LBS | HEART RATE: 66 BPM | SYSTOLIC BLOOD PRESSURE: 135 MMHG | DIASTOLIC BLOOD PRESSURE: 74 MMHG | BODY MASS INDEX: 27.44 KG/M2 | TEMPERATURE: 98.1 F | RESPIRATION RATE: 17 BRPM

## 2017-09-20 PROCEDURE — 99024 POSTOP FOLLOW-UP VISIT: CPT

## 2017-09-20 RX ORDER — OXCARBAZEPINE 150 MG/1
150 TABLET, FILM COATED ORAL
Qty: 180 | Refills: 5 | Status: DISCONTINUED | COMMUNITY
Start: 2017-03-10 | End: 2017-09-20

## 2017-09-20 RX ORDER — ECONAZOLE NITRATE 10 MG/G
1 CREAM TOPICAL TWICE DAILY
Qty: 1 | Refills: 6 | Status: DISCONTINUED | COMMUNITY
Start: 2017-07-21 | End: 2017-09-20

## 2017-09-20 RX ORDER — LEVETIRACETAM 500 MG/1
500 TABLET, FILM COATED ORAL
Qty: 150 | Refills: 5 | Status: DISCONTINUED | COMMUNITY
Start: 2017-03-10 | End: 2017-09-20

## 2017-09-20 RX ORDER — LEVETIRACETAM 500 MG/1
500 TABLET, FILM COATED ORAL
Qty: 150 | Refills: 5 | Status: DISCONTINUED | COMMUNITY
Start: 2017-07-17 | End: 2017-09-20

## 2017-09-20 RX ORDER — ASPIRIN 81 MG
81 TABLET, DELAYED RELEASE (ENTERIC COATED) ORAL
Refills: 0 | Status: DISCONTINUED | COMMUNITY
End: 2017-09-20

## 2017-09-20 RX ORDER — LACOSAMIDE 50 MG/1
50 TABLET, FILM COATED ORAL
Qty: 90 | Refills: 5 | Status: DISCONTINUED | COMMUNITY
Start: 2017-03-10 | End: 2017-09-20

## 2017-10-01 ENCOUNTER — OUTPATIENT (OUTPATIENT)
Dept: OUTPATIENT SERVICES | Facility: HOSPITAL | Age: 72
LOS: 1 days | End: 2017-10-01
Payer: MEDICAID

## 2017-10-01 ENCOUNTER — EMERGENCY (EMERGENCY)
Facility: HOSPITAL | Age: 72
LOS: 0 days | Discharge: HOME | End: 2017-10-01
Attending: EMERGENCY MEDICINE | Admitting: EMERGENCY MEDICINE
Payer: MEDICARE

## 2017-10-01 VITALS
TEMPERATURE: 98 F | SYSTOLIC BLOOD PRESSURE: 161 MMHG | RESPIRATION RATE: 17 BRPM | HEIGHT: 68 IN | DIASTOLIC BLOOD PRESSURE: 91 MMHG | OXYGEN SATURATION: 100 % | HEART RATE: 61 BPM | WEIGHT: 169.98 LBS

## 2017-10-01 VITALS
RESPIRATION RATE: 15 BRPM | OXYGEN SATURATION: 99 % | TEMPERATURE: 98 F | DIASTOLIC BLOOD PRESSURE: 70 MMHG | HEART RATE: 76 BPM | SYSTOLIC BLOOD PRESSURE: 141 MMHG

## 2017-10-01 DIAGNOSIS — Z98.89 OTHER SPECIFIED POSTPROCEDURAL STATES: Chronic | ICD-10-CM

## 2017-10-01 LAB
ALBUMIN SERPL ELPH-MCNC: 3 G/DL — LOW (ref 3.3–5)
ALBUMIN SERPL ELPH-MCNC: 3.4 G/DL — SIGNIFICANT CHANGE UP (ref 3.3–5)
ALP SERPL-CCNC: 88 U/L — SIGNIFICANT CHANGE UP (ref 40–120)
ALP SERPL-CCNC: 99 U/L — SIGNIFICANT CHANGE UP (ref 40–120)
ALT FLD-CCNC: 14 U/L — SIGNIFICANT CHANGE UP (ref 12–78)
ALT FLD-CCNC: 16 U/L — SIGNIFICANT CHANGE UP (ref 12–78)
ANION GAP SERPL CALC-SCNC: 6 MMOL/L — SIGNIFICANT CHANGE UP (ref 5–17)
ANION GAP SERPL CALC-SCNC: 6 MMOL/L — SIGNIFICANT CHANGE UP (ref 5–17)
AST SERPL-CCNC: 13 U/L — LOW (ref 15–37)
AST SERPL-CCNC: 31 U/L — SIGNIFICANT CHANGE UP (ref 15–37)
BASOPHILS # BLD AUTO: 0.1 K/UL — SIGNIFICANT CHANGE UP (ref 0–0.2)
BASOPHILS NFR BLD AUTO: 0.6 % — SIGNIFICANT CHANGE UP (ref 0–2)
BILIRUB SERPL-MCNC: 0.6 MG/DL — SIGNIFICANT CHANGE UP (ref 0.2–1.2)
BILIRUB SERPL-MCNC: 0.8 MG/DL — SIGNIFICANT CHANGE UP (ref 0.2–1.2)
BUN SERPL-MCNC: 20 MG/DL — SIGNIFICANT CHANGE UP (ref 7–23)
BUN SERPL-MCNC: 23 MG/DL — SIGNIFICANT CHANGE UP (ref 7–23)
CALCIUM SERPL-MCNC: 8.1 MG/DL — LOW (ref 8.5–10.1)
CALCIUM SERPL-MCNC: 8.8 MG/DL — SIGNIFICANT CHANGE UP (ref 8.5–10.1)
CHLORIDE SERPL-SCNC: 94 MMOL/L — LOW (ref 96–108)
CHLORIDE SERPL-SCNC: 97 MMOL/L — SIGNIFICANT CHANGE UP (ref 96–108)
CO2 SERPL-SCNC: 26 MMOL/L — SIGNIFICANT CHANGE UP (ref 22–31)
CO2 SERPL-SCNC: 26 MMOL/L — SIGNIFICANT CHANGE UP (ref 22–31)
CREAT SERPL-MCNC: 1.09 MG/DL — SIGNIFICANT CHANGE UP (ref 0.5–1.3)
CREAT SERPL-MCNC: 1.16 MG/DL — SIGNIFICANT CHANGE UP (ref 0.5–1.3)
EOSINOPHIL # BLD AUTO: 0.1 K/UL — SIGNIFICANT CHANGE UP (ref 0–0.5)
EOSINOPHIL NFR BLD AUTO: 0.8 % — SIGNIFICANT CHANGE UP (ref 0–6)
GLUCOSE SERPL-MCNC: 243 MG/DL — HIGH (ref 70–99)
GLUCOSE SERPL-MCNC: 270 MG/DL — HIGH (ref 70–99)
HCT VFR BLD CALC: 37.3 % — LOW (ref 39–50)
HGB BLD-MCNC: 13.4 G/DL — SIGNIFICANT CHANGE UP (ref 13–17)
INR BLD: 1.08 RATIO — SIGNIFICANT CHANGE UP (ref 0.88–1.16)
LYMPHOCYTES # BLD AUTO: 0.7 K/UL — LOW (ref 1–3.3)
LYMPHOCYTES # BLD AUTO: 8.4 % — LOW (ref 13–44)
MAGNESIUM SERPL-MCNC: 2 MG/DL — SIGNIFICANT CHANGE UP (ref 1.6–2.6)
MCHC RBC-ENTMCNC: 31.3 PG — SIGNIFICANT CHANGE UP (ref 27–34)
MCHC RBC-ENTMCNC: 36 GM/DL — SIGNIFICANT CHANGE UP (ref 32–36)
MCV RBC AUTO: 87 FL — SIGNIFICANT CHANGE UP (ref 80–100)
MONOCYTES # BLD AUTO: 0.4 K/UL — SIGNIFICANT CHANGE UP (ref 0–0.9)
MONOCYTES NFR BLD AUTO: 4.7 % — SIGNIFICANT CHANGE UP (ref 2–14)
NEUTROPHILS # BLD AUTO: 6.8 K/UL — SIGNIFICANT CHANGE UP (ref 1.8–7.4)
NEUTROPHILS NFR BLD AUTO: 85.4 % — HIGH (ref 43–77)
PLATELET # BLD AUTO: 253 K/UL — SIGNIFICANT CHANGE UP (ref 150–400)
POTASSIUM SERPL-MCNC: 4.7 MMOL/L — SIGNIFICANT CHANGE UP (ref 3.5–5.3)
POTASSIUM SERPL-MCNC: 5.7 MMOL/L — HIGH (ref 3.5–5.3)
POTASSIUM SERPL-SCNC: 4.7 MMOL/L — SIGNIFICANT CHANGE UP (ref 3.5–5.3)
POTASSIUM SERPL-SCNC: 5.7 MMOL/L — HIGH (ref 3.5–5.3)
PROT SERPL-MCNC: 5.7 GM/DL — LOW (ref 6–8.3)
PROT SERPL-MCNC: 6.8 GM/DL — SIGNIFICANT CHANGE UP (ref 6–8.3)
PROTHROM AB SERPL-ACNC: 11.7 SEC — SIGNIFICANT CHANGE UP (ref 9.8–12.7)
RBC # BLD: 4.28 M/UL — SIGNIFICANT CHANGE UP (ref 4.2–5.8)
RBC # FLD: 11.2 % — SIGNIFICANT CHANGE UP (ref 10.3–14.5)
SODIUM SERPL-SCNC: 126 MMOL/L — LOW (ref 135–145)
SODIUM SERPL-SCNC: 129 MMOL/L — LOW (ref 135–145)
TROPONIN I SERPL-MCNC: <0.015 NG/ML — SIGNIFICANT CHANGE UP (ref 0.01–0.04)
TROPONIN I SERPL-MCNC: <0.015 NG/ML — SIGNIFICANT CHANGE UP (ref 0.01–0.04)
WBC # BLD: 8 K/UL — SIGNIFICANT CHANGE UP (ref 3.8–10.5)
WBC # FLD AUTO: 8 K/UL — SIGNIFICANT CHANGE UP (ref 3.8–10.5)

## 2017-10-01 PROCEDURE — 93010 ELECTROCARDIOGRAM REPORT: CPT

## 2017-10-01 PROCEDURE — 70450 CT HEAD/BRAIN W/O DYE: CPT | Mod: 26

## 2017-10-01 PROCEDURE — 71010: CPT | Mod: 26

## 2017-10-01 PROCEDURE — 99285 EMERGENCY DEPT VISIT HI MDM: CPT

## 2017-10-01 RX ORDER — INSULIN LISPRO 100/ML
5 VIAL (ML) SUBCUTANEOUS ONCE
Qty: 0 | Refills: 0 | Status: COMPLETED | OUTPATIENT
Start: 2017-10-01 | End: 2017-10-01

## 2017-10-01 RX ORDER — SODIUM CHLORIDE 9 MG/ML
500 INJECTION INTRAMUSCULAR; INTRAVENOUS; SUBCUTANEOUS ONCE
Qty: 0 | Refills: 0 | Status: COMPLETED | OUTPATIENT
Start: 2017-10-01 | End: 2017-10-01

## 2017-10-01 RX ORDER — SODIUM CHLORIDE 9 MG/ML
1000 INJECTION INTRAMUSCULAR; INTRAVENOUS; SUBCUTANEOUS ONCE
Qty: 0 | Refills: 0 | Status: COMPLETED | OUTPATIENT
Start: 2017-10-01 | End: 2017-10-01

## 2017-10-01 RX ADMIN — SODIUM CHLORIDE 1000 MILLILITER(S): 9 INJECTION INTRAMUSCULAR; INTRAVENOUS; SUBCUTANEOUS at 16:30

## 2017-10-01 RX ADMIN — Medication 5 UNIT(S): at 22:30

## 2017-10-01 RX ADMIN — SODIUM CHLORIDE 500 MILLILITER(S): 9 INJECTION INTRAMUSCULAR; INTRAVENOUS; SUBCUTANEOUS at 15:58

## 2017-10-01 NOTE — ED ADULT NURSE REASSESSMENT NOTE - NS ED NURSE REASSESS COMMENT FT1
Diabetes education provided at bedside. Glucose testing provided at bedside. Insulin administration teaching provided at bedside. patient verbalized understanding. patient is alert and oriented. teach vback method was implemented.  patient to be discharged to home. patient verbalized understanding to follow up with outside providers. Diabetes education provided at bedside. Glucose testing provided at bedside. Insulin administration teaching provided at bedside. patient verbalized understanding. patient is alert and oriented. teach black method was implemented.  patient to be discharged to home. patient verbalized understanding to follow up with outside providers.

## 2017-10-01 NOTE — ED ADULT NURSE NOTE - OBJECTIVE STATEMENT
Patient woke up feeling lightheaded, returned to bed, dizziness improved slightly.  States he Fell 8 wks ago, causing "spine problems" had surgical repair with neck brace x 7 wks. D/c from [facility] on friday.  Original cause of fall was dizziness.  Patient was recently placed on insulin unable to use insulin needle and so returned to [facility].  Vomited when he came to ER.  Dizziness resolved.

## 2017-10-01 NOTE — ED ADULT NURSE REASSESSMENT NOTE - NS ED NURSE REASSESS COMMENT FT1
Care of pt received at 1930 from Mindy Choudhury RN. Pt given sandwich as per request, pt updated as to plan of care, pt remains on bedside monitor. Awaiting additional lab results at this time. Will continue to monitor.

## 2017-10-01 NOTE — ED PROVIDER NOTE - MEDICAL DECISION MAKING DETAILS
EKG nonischemic, no arrhythmia.  Troponin undetectable.  Hyperglycemic without evidence of DKA.  Hyponatremia slight, improved with fluids (corrected sodium 132).  CT head and CXR WNL.  No concern for acute cardiac, infectious, or neurologic cause of patient's symptoms.  Pt feeling better on reexam.  Okay for d/c home.  Given insulin 5 U subcutaneously.  Has nursing aides visiting house tomorrow 2 PM who will help with education on his medications.  Return precautions given.

## 2017-10-01 NOTE — ED PROVIDER NOTE - OBJECTIVE STATEMENT
71 y/o with hx of DM, epilepsy presents to the ED c/o lightheadedness starting this morning, he went back to bed hoping it would resolve but when it didn't he called EMS. Pt states he was nauseas in the ambulance and vomited as he arrived to the ED. Pt states he is not light-headed in the ED. Pt had a fall 8 weeks ago causing him to land on his face and have spinal surgery with a neck brace that was removed a week ago. Pt states fall was due to him feeling lightheaded. Pt denies SOB, CP, heart palpitation, diarrhea. Pt was admitted to [facility] [place] until 09/29 where they changed his DM medication but today he couldn't figure out how to use the insulin injection so he took his old pill form medication. Pt did take his seizure medication today. PCP Odalis

## 2017-10-03 DIAGNOSIS — R11.2 NAUSEA WITH VOMITING, UNSPECIFIED: ICD-10-CM

## 2017-10-03 DIAGNOSIS — E11.65 TYPE 2 DIABETES MELLITUS WITH HYPERGLYCEMIA: ICD-10-CM

## 2017-10-03 DIAGNOSIS — G40.909 EPILEPSY, UNSPECIFIED, NOT INTRACTABLE, WITHOUT STATUS EPILEPTICUS: ICD-10-CM

## 2017-10-03 DIAGNOSIS — R42 DIZZINESS AND GIDDINESS: ICD-10-CM

## 2017-10-03 DIAGNOSIS — R53.1 WEAKNESS: ICD-10-CM

## 2017-10-13 DIAGNOSIS — R69 ILLNESS, UNSPECIFIED: ICD-10-CM

## 2017-11-10 ENCOUNTER — APPOINTMENT (OUTPATIENT)
Dept: NEUROLOGY | Facility: CLINIC | Age: 72
End: 2017-11-10
Payer: MEDICARE

## 2017-11-10 VITALS
DIASTOLIC BLOOD PRESSURE: 66 MMHG | BODY MASS INDEX: 26.92 KG/M2 | SYSTOLIC BLOOD PRESSURE: 108 MMHG | HEIGHT: 70 IN | WEIGHT: 188 LBS | HEART RATE: 68 BPM

## 2017-11-10 DIAGNOSIS — Z86.79 PERSONAL HISTORY OF OTHER DISEASES OF THE CIRCULATORY SYSTEM: ICD-10-CM

## 2017-11-10 PROCEDURE — 99214 OFFICE O/P EST MOD 30 MIN: CPT

## 2017-11-10 RX ORDER — BLOOD-GLUCOSE METER
W/DEVICE EACH MISCELLANEOUS
Qty: 1 | Refills: 0 | Status: ACTIVE | COMMUNITY
Start: 2017-09-28

## 2017-11-10 RX ORDER — INSULIN LISPRO 100 [IU]/ML
100 INJECTION, SOLUTION INTRAVENOUS; SUBCUTANEOUS
Refills: 0 | Status: ACTIVE | COMMUNITY

## 2017-11-10 RX ORDER — BLOOD SUGAR DIAGNOSTIC
STRIP MISCELLANEOUS
Qty: 100 | Refills: 0 | Status: ACTIVE | COMMUNITY
Start: 2017-10-23

## 2017-11-10 RX ORDER — PEN NEEDLE, DIABETIC 29 G X1/2"
32G X 4 MM NEEDLE, DISPOSABLE MISCELLANEOUS
Qty: 100 | Refills: 0 | Status: ACTIVE | COMMUNITY
Start: 2017-09-28

## 2017-11-10 RX ORDER — PANTOPRAZOLE 40 MG/1
40 TABLET, DELAYED RELEASE ORAL
Qty: 30 | Refills: 0 | Status: ACTIVE | COMMUNITY
Start: 2017-09-28

## 2017-11-10 RX ORDER — INSULIN GLARGINE 100 [IU]/ML
100 INJECTION, SOLUTION SUBCUTANEOUS
Qty: 3 | Refills: 0 | Status: ACTIVE | COMMUNITY
Start: 2017-09-28

## 2017-11-10 RX ORDER — MULTIVITAMIN
TABLET ORAL
Refills: 0 | Status: ACTIVE | COMMUNITY

## 2017-11-10 RX ORDER — INSULIN GLARGINE 100 [IU]/ML
100 INJECTION, SOLUTION SUBCUTANEOUS
Refills: 0 | Status: ACTIVE | COMMUNITY

## 2018-01-30 ENCOUNTER — MEDICATION RENEWAL (OUTPATIENT)
Age: 73
End: 2018-01-30

## 2018-02-08 ENCOUNTER — APPOINTMENT (OUTPATIENT)
Dept: NEUROSURGERY | Facility: CLINIC | Age: 73
End: 2018-02-08
Payer: MEDICARE

## 2018-02-08 VITALS
SYSTOLIC BLOOD PRESSURE: 151 MMHG | DIASTOLIC BLOOD PRESSURE: 80 MMHG | TEMPERATURE: 98 F | HEART RATE: 89 BPM | HEIGHT: 70 IN | BODY MASS INDEX: 27.92 KG/M2 | WEIGHT: 195 LBS | RESPIRATION RATE: 17 BRPM

## 2018-02-08 DIAGNOSIS — M25.561 PAIN IN RIGHT KNEE: ICD-10-CM

## 2018-02-08 PROCEDURE — 99024 POSTOP FOLLOW-UP VISIT: CPT

## 2018-02-10 ENCOUNTER — RX RENEWAL (OUTPATIENT)
Age: 73
End: 2018-02-10

## 2018-02-21 ENCOUNTER — FORM ENCOUNTER (OUTPATIENT)
Age: 73
End: 2018-02-21

## 2018-02-22 ENCOUNTER — APPOINTMENT (OUTPATIENT)
Dept: RADIOLOGY | Facility: CLINIC | Age: 73
End: 2018-02-22
Payer: MEDICARE

## 2018-02-22 ENCOUNTER — OUTPATIENT (OUTPATIENT)
Dept: OUTPATIENT SERVICES | Facility: HOSPITAL | Age: 73
LOS: 1 days | End: 2018-02-22
Payer: MEDICARE

## 2018-02-22 ENCOUNTER — APPOINTMENT (OUTPATIENT)
Dept: MRI IMAGING | Facility: CLINIC | Age: 73
End: 2018-02-22
Payer: MEDICARE

## 2018-02-22 DIAGNOSIS — Z00.8 ENCOUNTER FOR OTHER GENERAL EXAMINATION: ICD-10-CM

## 2018-02-22 DIAGNOSIS — Z98.89 OTHER SPECIFIED POSTPROCEDURAL STATES: Chronic | ICD-10-CM

## 2018-02-22 PROCEDURE — 73564 X-RAY EXAM KNEE 4 OR MORE: CPT

## 2018-02-22 PROCEDURE — 73564 X-RAY EXAM KNEE 4 OR MORE: CPT | Mod: 26,RT

## 2018-02-22 PROCEDURE — 72141 MRI NECK SPINE W/O DYE: CPT

## 2018-02-22 PROCEDURE — 72141 MRI NECK SPINE W/O DYE: CPT | Mod: 26

## 2018-03-01 LAB
ALBUMIN SERPL ELPH-MCNC: 4.2 G/DL
ALP BLD-CCNC: 89 U/L
ALT SERPL-CCNC: 12 U/L
ANION GAP SERPL CALC-SCNC: 14 MMOL/L
AST SERPL-CCNC: 16 U/L
BASOPHILS # BLD AUTO: 0.03 K/UL
BASOPHILS NFR BLD AUTO: 0.4 %
BILIRUB SERPL-MCNC: 0.5 MG/DL
BUN SERPL-MCNC: 32 MG/DL
CALCIUM SERPL-MCNC: 9.4 MG/DL
CHLORIDE SERPL-SCNC: 102 MMOL/L
CO2 SERPL-SCNC: 24 MMOL/L
CREAT SERPL-MCNC: 1.49 MG/DL
EOSINOPHIL # BLD AUTO: 0.47 K/UL
EOSINOPHIL NFR BLD AUTO: 6.9 %
GLUCOSE SERPL-MCNC: 167 MG/DL
HCT VFR BLD CALC: 39.7 %
HGB BLD-MCNC: 13.8 G/DL
IMM GRANULOCYTES NFR BLD AUTO: 0.3 %
LEVETIRACETAM SERPL-MCNC: 62 MCG/ML
LYMPHOCYTES # BLD AUTO: 1.08 K/UL
LYMPHOCYTES NFR BLD AUTO: 15.8 %
MAN DIFF?: NORMAL
MCHC RBC-ENTMCNC: 30.6 PG
MCHC RBC-ENTMCNC: 34.8 GM/DL
MCV RBC AUTO: 88 FL
MONOCYTES # BLD AUTO: 0.48 K/UL
MONOCYTES NFR BLD AUTO: 7 %
NEUTROPHILS # BLD AUTO: 4.74 K/UL
NEUTROPHILS NFR BLD AUTO: 69.6 %
OXCARBAZEPINE SERPL-MCNC: 22 UG/ML
PLATELET # BLD AUTO: 254 K/UL
POTASSIUM SERPL-SCNC: 5.3 MMOL/L
PROT SERPL-MCNC: 6.3 G/DL
RBC # BLD: 4.51 M/UL
RBC # FLD: 13.5 %
SODIUM SERPL-SCNC: 140 MMOL/L
WBC # FLD AUTO: 6.82 K/UL

## 2018-03-08 ENCOUNTER — APPOINTMENT (OUTPATIENT)
Dept: NEUROSURGERY | Facility: CLINIC | Age: 73
End: 2018-03-08
Payer: MEDICARE

## 2018-03-08 VITALS
BODY MASS INDEX: 27.92 KG/M2 | WEIGHT: 195 LBS | DIASTOLIC BLOOD PRESSURE: 76 MMHG | HEIGHT: 70 IN | TEMPERATURE: 97.4 F | SYSTOLIC BLOOD PRESSURE: 144 MMHG | RESPIRATION RATE: 15 BRPM | HEART RATE: 95 BPM

## 2018-03-08 PROCEDURE — 99024 POSTOP FOLLOW-UP VISIT: CPT

## 2018-03-12 ENCOUNTER — RX RENEWAL (OUTPATIENT)
Age: 73
End: 2018-03-12

## 2018-03-13 ENCOUNTER — RX RENEWAL (OUTPATIENT)
Age: 73
End: 2018-03-13

## 2018-03-17 ENCOUNTER — EMERGENCY (EMERGENCY)
Facility: HOSPITAL | Age: 73
LOS: 0 days | Discharge: HOME | End: 2018-03-17
Attending: EMERGENCY MEDICINE | Admitting: EMERGENCY MEDICINE
Payer: MEDICARE

## 2018-03-17 VITALS — HEIGHT: 71 IN | WEIGHT: 195.11 LBS

## 2018-03-17 VITALS
HEART RATE: 57 BPM | OXYGEN SATURATION: 100 % | TEMPERATURE: 97 F | DIASTOLIC BLOOD PRESSURE: 78 MMHG | RESPIRATION RATE: 16 BRPM | SYSTOLIC BLOOD PRESSURE: 163 MMHG

## 2018-03-17 DIAGNOSIS — N18.6 END STAGE RENAL DISEASE: ICD-10-CM

## 2018-03-17 DIAGNOSIS — Z79.4 LONG TERM (CURRENT) USE OF INSULIN: ICD-10-CM

## 2018-03-17 DIAGNOSIS — Q85.8 OTHER PHAKOMATOSES, NOT ELSEWHERE CLASSIFIED: ICD-10-CM

## 2018-03-17 DIAGNOSIS — I12.0 HYPERTENSIVE CHRONIC KIDNEY DISEASE WITH STAGE 5 CHRONIC KIDNEY DISEASE OR END STAGE RENAL DISEASE: ICD-10-CM

## 2018-03-17 DIAGNOSIS — I77.74 DISSECTION OF VERTEBRAL ARTERY: ICD-10-CM

## 2018-03-17 DIAGNOSIS — E11.9 TYPE 2 DIABETES MELLITUS WITHOUT COMPLICATIONS: ICD-10-CM

## 2018-03-17 DIAGNOSIS — E78.5 HYPERLIPIDEMIA, UNSPECIFIED: ICD-10-CM

## 2018-03-17 DIAGNOSIS — Z76.0 ENCOUNTER FOR ISSUE OF REPEAT PRESCRIPTION: ICD-10-CM

## 2018-03-17 DIAGNOSIS — G40.909 EPILEPSY, UNSPECIFIED, NOT INTRACTABLE, WITHOUT STATUS EPILEPTICUS: ICD-10-CM

## 2018-03-17 DIAGNOSIS — Z98.89 OTHER SPECIFIED POSTPROCEDURAL STATES: Chronic | ICD-10-CM

## 2018-03-17 DIAGNOSIS — J45.909 UNSPECIFIED ASTHMA, UNCOMPLICATED: ICD-10-CM

## 2018-03-17 PROCEDURE — 99284 EMERGENCY DEPT VISIT MOD MDM: CPT

## 2018-03-17 NOTE — ED ADULT TRIAGE NOTE - CHIEF COMPLAINT QUOTE
Pt reports that his nighttime diabetic insulin dose was increased by MD and as a result, he has run out three days short of his next refill.  Pt thinks that the medication might be lantus, but he is sure that it starts with an "L"

## 2018-03-17 NOTE — ED STATDOCS - MEDICAL DECISION MAKING DETAILS
Pt with h/o DM ran out of lantus but has short acting insulin at home. Asymptomatic. . Will advise covering with sliding scale x 2 days until he can  next prescription. Pt understands reasons to come back, comfortable with discharge plan, understands return instructions.

## 2018-03-17 NOTE — ED STATDOCS - OBJECTIVE STATEMENT
71 y/o M with Hx IDDM presents to ED for medication refill. Pt reports his insulin dose was increased by his PCP and as a result he ran out 3 days early. He states he is here because his refill is on Monday (3 days) and he is concerned about not having his insulin. Pt has no complaints at this time. He reports he takes 30 units of lantis at night. 71 y/o M with Hx IDDM presents to ED for medication refill. Pt reports his insulin dose was increased by his PCP and as a result he ran out 3 days early. He states he is here because his refill is on Monday (3 days) and he is concerned about not having his insulin. Pt has no complaints at this time. He reports he takes 30 units of lantis at night. He uses "daytime" insulin per sliding scale and has plenty of that at home, wants to know if he can take that in lieu of lantus for the next 2 days.

## 2018-03-17 NOTE — ED STATDOCS - PROGRESS NOTE DETAILS
.  Will cover with short acting at home.   Rx in two days.  Lily Manriquez PA-C .  Will cover with short acting insulin at home.   Rx in two days.  Lily Manriquez PA-C

## 2018-03-17 NOTE — ED STATDOCS - ATTENDING CONTRIBUTION TO CARE
I, Cele Sauer MD, personally saw the patient with ACP.  I have personally performed a face to face diagnostic evaluation on this patient.  I have reviewed the ACP note and agree with the history, exam, and plan of care, except as noted. Pt with DM ran out of lantus, normal physical exam, no symptoms,  will d/c with instructions to take "daytime" insulin for 2 days and close pmd follow up

## 2018-04-13 ENCOUNTER — RX RENEWAL (OUTPATIENT)
Age: 73
End: 2018-04-13

## 2018-04-30 ENCOUNTER — APPOINTMENT (OUTPATIENT)
Dept: NEUROLOGY | Facility: CLINIC | Age: 73
End: 2018-04-30
Payer: MEDICARE

## 2018-04-30 VITALS
BODY MASS INDEX: 27.2 KG/M2 | RESPIRATION RATE: 16 BRPM | SYSTOLIC BLOOD PRESSURE: 140 MMHG | WEIGHT: 190 LBS | HEART RATE: 78 BPM | HEIGHT: 70 IN | DIASTOLIC BLOOD PRESSURE: 78 MMHG

## 2018-04-30 PROCEDURE — 99214 OFFICE O/P EST MOD 30 MIN: CPT

## 2018-05-03 ENCOUNTER — APPOINTMENT (OUTPATIENT)
Dept: NEUROSURGERY | Facility: CLINIC | Age: 73
End: 2018-05-03
Payer: MEDICARE

## 2018-05-03 ENCOUNTER — APPOINTMENT (OUTPATIENT)
Dept: NEUROLOGY | Facility: CLINIC | Age: 73
End: 2018-05-03
Payer: MEDICARE

## 2018-05-03 VITALS
HEART RATE: 60 BPM | BODY MASS INDEX: 28.63 KG/M2 | RESPIRATION RATE: 16 BRPM | SYSTOLIC BLOOD PRESSURE: 155 MMHG | WEIGHT: 200 LBS | HEIGHT: 70 IN | DIASTOLIC BLOOD PRESSURE: 88 MMHG | TEMPERATURE: 98.3 F

## 2018-05-03 PROCEDURE — 99024 POSTOP FOLLOW-UP VISIT: CPT

## 2018-05-03 PROCEDURE — 95816 EEG AWAKE AND DROWSY: CPT

## 2018-05-11 ENCOUNTER — RX RENEWAL (OUTPATIENT)
Age: 73
End: 2018-05-11

## 2018-05-22 ENCOUNTER — LABORATORY RESULT (OUTPATIENT)
Age: 73
End: 2018-05-22

## 2018-05-23 ENCOUNTER — EMERGENCY (EMERGENCY)
Facility: HOSPITAL | Age: 73
LOS: 0 days | Discharge: HOME | End: 2018-05-23
Attending: EMERGENCY MEDICINE | Admitting: EMERGENCY MEDICINE
Payer: MEDICARE

## 2018-05-23 VITALS
SYSTOLIC BLOOD PRESSURE: 185 MMHG | TEMPERATURE: 98 F | RESPIRATION RATE: 17 BRPM | HEIGHT: 71 IN | OXYGEN SATURATION: 99 % | HEART RATE: 66 BPM | WEIGHT: 199.96 LBS | DIASTOLIC BLOOD PRESSURE: 85 MMHG

## 2018-05-23 DIAGNOSIS — K21.9 GASTRO-ESOPHAGEAL REFLUX DISEASE WITHOUT ESOPHAGITIS: ICD-10-CM

## 2018-05-23 DIAGNOSIS — Z98.89 OTHER SPECIFIED POSTPROCEDURAL STATES: Chronic | ICD-10-CM

## 2018-05-23 DIAGNOSIS — Z90.79 ACQUIRED ABSENCE OF OTHER GENITAL ORGAN(S): ICD-10-CM

## 2018-05-23 DIAGNOSIS — N18.9 CHRONIC KIDNEY DISEASE, UNSPECIFIED: ICD-10-CM

## 2018-05-23 DIAGNOSIS — E78.5 HYPERLIPIDEMIA, UNSPECIFIED: ICD-10-CM

## 2018-05-23 DIAGNOSIS — Z79.899 OTHER LONG TERM (CURRENT) DRUG THERAPY: ICD-10-CM

## 2018-05-23 DIAGNOSIS — I12.9 HYPERTENSIVE CHRONIC KIDNEY DISEASE WITH STAGE 1 THROUGH STAGE 4 CHRONIC KIDNEY DISEASE, OR UNSPECIFIED CHRONIC KIDNEY DISEASE: ICD-10-CM

## 2018-05-23 DIAGNOSIS — Z01.89 ENCOUNTER FOR OTHER SPECIFIED SPECIAL EXAMINATIONS: ICD-10-CM

## 2018-05-23 DIAGNOSIS — E11.9 TYPE 2 DIABETES MELLITUS WITHOUT COMPLICATIONS: ICD-10-CM

## 2018-05-23 DIAGNOSIS — G40.909 EPILEPSY, UNSPECIFIED, NOT INTRACTABLE, WITHOUT STATUS EPILEPTICUS: ICD-10-CM

## 2018-05-23 DIAGNOSIS — R79.89 OTHER SPECIFIED ABNORMAL FINDINGS OF BLOOD CHEMISTRY: ICD-10-CM

## 2018-05-23 DIAGNOSIS — Q85.8 OTHER PHAKOMATOSES, NOT ELSEWHERE CLASSIFIED: ICD-10-CM

## 2018-05-23 DIAGNOSIS — Z98.890 OTHER SPECIFIED POSTPROCEDURAL STATES: ICD-10-CM

## 2018-05-23 LAB
ALBUMIN SERPL ELPH-MCNC: 3.3 G/DL — SIGNIFICANT CHANGE UP (ref 3.3–5)
ALP SERPL-CCNC: 82 U/L — SIGNIFICANT CHANGE UP (ref 40–120)
ALT FLD-CCNC: 19 U/L — SIGNIFICANT CHANGE UP (ref 12–78)
ANION GAP SERPL CALC-SCNC: 5 MMOL/L — SIGNIFICANT CHANGE UP (ref 5–17)
AST SERPL-CCNC: 20 U/L — SIGNIFICANT CHANGE UP (ref 15–37)
BASOPHILS # BLD AUTO: 0.03 K/UL — SIGNIFICANT CHANGE UP (ref 0–0.2)
BASOPHILS NFR BLD AUTO: 0.5 % — SIGNIFICANT CHANGE UP (ref 0–2)
BILIRUB SERPL-MCNC: 0.3 MG/DL — SIGNIFICANT CHANGE UP (ref 0.2–1.2)
BUN SERPL-MCNC: 31 MG/DL — HIGH (ref 7–23)
CALCIUM SERPL-MCNC: 8.3 MG/DL — LOW (ref 8.5–10.1)
CHLORIDE SERPL-SCNC: 103 MMOL/L — SIGNIFICANT CHANGE UP (ref 96–108)
CO2 SERPL-SCNC: 28 MMOL/L — SIGNIFICANT CHANGE UP (ref 22–31)
CREAT SERPL-MCNC: 1.51 MG/DL — HIGH (ref 0.5–1.3)
EOSINOPHIL # BLD AUTO: 0.38 K/UL — SIGNIFICANT CHANGE UP (ref 0–0.5)
EOSINOPHIL NFR BLD AUTO: 6.3 % — HIGH (ref 0–6)
GLUCOSE SERPL-MCNC: 237 MG/DL — HIGH (ref 70–99)
HCT VFR BLD CALC: 35.5 % — LOW (ref 39–50)
HGB BLD-MCNC: 13 G/DL — SIGNIFICANT CHANGE UP (ref 13–17)
IMM GRANULOCYTES NFR BLD AUTO: 0.7 % — SIGNIFICANT CHANGE UP (ref 0–1.5)
LYMPHOCYTES # BLD AUTO: 1.17 K/UL — SIGNIFICANT CHANGE UP (ref 1–3.3)
LYMPHOCYTES # BLD AUTO: 19.4 % — SIGNIFICANT CHANGE UP (ref 13–44)
MAGNESIUM SERPL-MCNC: 2 MG/DL — SIGNIFICANT CHANGE UP (ref 1.6–2.6)
MCHC RBC-ENTMCNC: 31 PG — SIGNIFICANT CHANGE UP (ref 27–34)
MCHC RBC-ENTMCNC: 36.6 GM/DL — HIGH (ref 32–36)
MCV RBC AUTO: 84.5 FL — SIGNIFICANT CHANGE UP (ref 80–100)
MONOCYTES # BLD AUTO: 0.68 K/UL — SIGNIFICANT CHANGE UP (ref 0–0.9)
MONOCYTES NFR BLD AUTO: 11.3 % — SIGNIFICANT CHANGE UP (ref 2–14)
NEUTROPHILS # BLD AUTO: 3.73 K/UL — SIGNIFICANT CHANGE UP (ref 1.8–7.4)
NEUTROPHILS NFR BLD AUTO: 61.8 % — SIGNIFICANT CHANGE UP (ref 43–77)
NRBC # BLD: 0 /100 WBCS — SIGNIFICANT CHANGE UP (ref 0–0)
PLATELET # BLD AUTO: 185 K/UL — SIGNIFICANT CHANGE UP (ref 150–400)
POTASSIUM SERPL-MCNC: 5 MMOL/L — SIGNIFICANT CHANGE UP (ref 3.5–5.3)
POTASSIUM SERPL-SCNC: 5 MMOL/L — SIGNIFICANT CHANGE UP (ref 3.5–5.3)
PROT SERPL-MCNC: 6.3 GM/DL — SIGNIFICANT CHANGE UP (ref 6–8.3)
RBC # BLD: 4.2 M/UL — SIGNIFICANT CHANGE UP (ref 4.2–5.8)
RBC # FLD: 13 % — SIGNIFICANT CHANGE UP (ref 10.3–14.5)
SODIUM SERPL-SCNC: 136 MMOL/L — SIGNIFICANT CHANGE UP (ref 135–145)
WBC # BLD: 6.03 K/UL — SIGNIFICANT CHANGE UP (ref 3.8–10.5)
WBC # FLD AUTO: 6.03 K/UL — SIGNIFICANT CHANGE UP (ref 3.8–10.5)

## 2018-05-23 PROCEDURE — 99283 EMERGENCY DEPT VISIT LOW MDM: CPT

## 2018-05-23 PROCEDURE — 93010 ELECTROCARDIOGRAM REPORT: CPT

## 2018-05-23 NOTE — ED PROVIDER NOTE - OBJECTIVE STATEMENT
72 yo male with h/o HTN, DM, GERD, seizure d/o who had routine blood work today around 1pm.  Called by MD and told to come to ED for K+ 6.2.  Pt feels well and has no complaints.

## 2018-05-24 ENCOUNTER — CLINICAL ADVICE (OUTPATIENT)
Age: 73
End: 2018-05-24

## 2018-05-24 LAB
ALBUMIN SERPL ELPH-MCNC: 3.7 G/DL
ALP BLD-CCNC: 81 U/L
ALT SERPL-CCNC: 12 U/L
ANION GAP SERPL CALC-SCNC: 12 MMOL/L
AST SERPL-CCNC: 20 U/L
BASOPHILS # BLD AUTO: 0.03 K/UL
BASOPHILS NFR BLD AUTO: 0.4 %
BILIRUB SERPL-MCNC: 0.5 MG/DL
BUN SERPL-MCNC: 28 MG/DL
CALCIUM SERPL-MCNC: 9.3 MG/DL
CHLORIDE SERPL-SCNC: 100 MMOL/L
CO2 SERPL-SCNC: 23 MMOL/L
CREAT SERPL-MCNC: 1.57 MG/DL
EOSINOPHIL # BLD AUTO: 0.28 K/UL
EOSINOPHIL NFR BLD AUTO: 3.9 %
GLUCOSE SERPL-MCNC: 142 MG/DL
HCT VFR BLD CALC: 39.4 %
HGB BLD-MCNC: 13.9 G/DL
IMM GRANULOCYTES NFR BLD AUTO: 0.4 %
LEVETIRACETAM SERPL-MCNC: 68.6 MCG/ML
LYMPHOCYTES # BLD AUTO: 1.2 K/UL
LYMPHOCYTES NFR BLD AUTO: 16.8 %
MAN DIFF?: NORMAL
MCHC RBC-ENTMCNC: 30.3 PG
MCHC RBC-ENTMCNC: 35.3 GM/DL
MCV RBC AUTO: 85.8 FL
MONOCYTES # BLD AUTO: 0.6 K/UL
MONOCYTES NFR BLD AUTO: 8.4 %
NEUTROPHILS # BLD AUTO: 5.02 K/UL
NEUTROPHILS NFR BLD AUTO: 70.1 %
OXCARBAZEPINE SERPL-MCNC: 38 UG/ML
PLATELET # BLD AUTO: 246 K/UL
POTASSIUM SERPL-SCNC: 6.2 MMOL/L
PROT SERPL-MCNC: 6.5 G/DL
RBC # BLD: 4.59 M/UL
RBC # FLD: 13.4 %
SODIUM SERPL-SCNC: 135 MMOL/L
WBC # FLD AUTO: 7.16 K/UL

## 2018-06-01 PROCEDURE — G9005: CPT

## 2018-06-07 ENCOUNTER — APPOINTMENT (OUTPATIENT)
Dept: NEUROLOGY | Facility: CLINIC | Age: 73
End: 2018-06-07
Payer: MEDICARE

## 2018-06-07 VITALS
HEIGHT: 70 IN | SYSTOLIC BLOOD PRESSURE: 144 MMHG | HEART RATE: 66 BPM | DIASTOLIC BLOOD PRESSURE: 78 MMHG | WEIGHT: 200 LBS | BODY MASS INDEX: 28.63 KG/M2

## 2018-06-07 PROCEDURE — 99214 OFFICE O/P EST MOD 30 MIN: CPT

## 2018-06-21 ENCOUNTER — MEDICATION RENEWAL (OUTPATIENT)
Age: 73
End: 2018-06-21

## 2018-07-01 ENCOUNTER — OUTPATIENT (OUTPATIENT)
Dept: OUTPATIENT SERVICES | Facility: HOSPITAL | Age: 73
LOS: 1 days | End: 2018-07-01
Payer: MEDICARE

## 2018-07-01 DIAGNOSIS — Z98.89 OTHER SPECIFIED POSTPROCEDURAL STATES: Chronic | ICD-10-CM

## 2018-07-13 DIAGNOSIS — Z71.89 OTHER SPECIFIED COUNSELING: ICD-10-CM

## 2018-07-19 LAB — LEVETIRACETAM SERPL-MCNC: 44.1 MCG/ML

## 2018-07-20 LAB — OXCARBAZEPINE SERPL-MCNC: 20 UG/ML

## 2018-07-30 ENCOUNTER — RX RENEWAL (OUTPATIENT)
Age: 73
End: 2018-07-30

## 2018-08-29 PROBLEM — E78.5 HYPERLIPIDEMIA, UNSPECIFIED: Chronic | Status: ACTIVE | Noted: 2017-02-22

## 2018-08-29 PROBLEM — J45.909 UNSPECIFIED ASTHMA, UNCOMPLICATED: Chronic | Status: ACTIVE | Noted: 2017-02-21

## 2018-08-29 PROBLEM — I77.74 DISSECTION OF VERTEBRAL ARTERY: Chronic | Status: ACTIVE | Noted: 2017-02-21

## 2018-08-29 PROBLEM — Q85.8 OTHER PHAKOMATOSES, NOT ELSEWHERE CLASSIFIED: Chronic | Status: ACTIVE | Noted: 2017-02-21

## 2018-09-10 ENCOUNTER — APPOINTMENT (OUTPATIENT)
Dept: UROLOGY | Facility: CLINIC | Age: 73
End: 2018-09-10
Payer: MEDICARE

## 2018-09-10 ENCOUNTER — RX RENEWAL (OUTPATIENT)
Age: 73
End: 2018-09-10

## 2018-09-10 VITALS
DIASTOLIC BLOOD PRESSURE: 73 MMHG | BODY MASS INDEX: 28.31 KG/M2 | HEART RATE: 57 BPM | SYSTOLIC BLOOD PRESSURE: 142 MMHG | TEMPERATURE: 98.2 F | OXYGEN SATURATION: 98 % | HEIGHT: 70.5 IN | WEIGHT: 200 LBS

## 2018-09-10 PROCEDURE — 99204 OFFICE O/P NEW MOD 45 MIN: CPT

## 2018-09-20 ENCOUNTER — APPOINTMENT (OUTPATIENT)
Dept: NEUROLOGY | Facility: CLINIC | Age: 73
End: 2018-09-20
Payer: MEDICARE

## 2018-09-20 VITALS
BODY MASS INDEX: 26.9 KG/M2 | HEART RATE: 54 BPM | SYSTOLIC BLOOD PRESSURE: 132 MMHG | WEIGHT: 190 LBS | DIASTOLIC BLOOD PRESSURE: 66 MMHG | HEIGHT: 70.5 IN

## 2018-09-20 DIAGNOSIS — Z87.898 PERSONAL HISTORY OF OTHER SPECIFIED CONDITIONS: ICD-10-CM

## 2018-09-20 DIAGNOSIS — Z78.9 OTHER SPECIFIED HEALTH STATUS: ICD-10-CM

## 2018-09-20 DIAGNOSIS — Z86.39 PERSONAL HISTORY OF OTHER ENDOCRINE, NUTRITIONAL AND METABOLIC DISEASE: ICD-10-CM

## 2018-09-20 PROCEDURE — 99214 OFFICE O/P EST MOD 30 MIN: CPT

## 2018-09-24 ENCOUNTER — APPOINTMENT (OUTPATIENT)
Dept: UROLOGY | Facility: CLINIC | Age: 73
End: 2018-09-24
Payer: MEDICARE

## 2018-09-24 VITALS — SYSTOLIC BLOOD PRESSURE: 149 MMHG | HEART RATE: 63 BPM | DIASTOLIC BLOOD PRESSURE: 69 MMHG

## 2018-09-24 PROCEDURE — 51797 INTRAABDOMINAL PRESSURE TEST: CPT

## 2018-09-24 PROCEDURE — 51784 ANAL/URINARY MUSCLE STUDY: CPT

## 2018-09-24 PROCEDURE — 51798 US URINE CAPACITY MEASURE: CPT | Mod: 59

## 2018-09-24 PROCEDURE — 51728 CYSTOMETROGRAM W/VP: CPT

## 2018-09-24 PROCEDURE — 51741 ELECTRO-UROFLOWMETRY FIRST: CPT

## 2018-09-27 ENCOUNTER — LABORATORY RESULT (OUTPATIENT)
Age: 73
End: 2018-09-27

## 2018-09-28 LAB
ALBUMIN SERPL ELPH-MCNC: 3.6 G/DL
ALP BLD-CCNC: 78 U/L
ALT SERPL-CCNC: 13 U/L
ANION GAP SERPL CALC-SCNC: 10 MMOL/L
AST SERPL-CCNC: 18 U/L
BASOPHILS # BLD AUTO: 0.04 K/UL
BASOPHILS NFR BLD AUTO: 0.5 %
BILIRUB SERPL-MCNC: 0.6 MG/DL
BUN SERPL-MCNC: 25 MG/DL
CALCIUM SERPL-MCNC: 8.9 MG/DL
CHLORIDE SERPL-SCNC: 97 MMOL/L
CO2 SERPL-SCNC: 25 MMOL/L
CREAT SERPL-MCNC: 1.47 MG/DL
EOSINOPHIL # BLD AUTO: 0.48 K/UL
EOSINOPHIL NFR BLD AUTO: 5.9 %
GLUCOSE SERPL-MCNC: 208 MG/DL
HCT VFR BLD CALC: 37.4 %
HGB BLD-MCNC: 13.1 G/DL
IMM GRANULOCYTES NFR BLD AUTO: 1.6 %
LYMPHOCYTES # BLD AUTO: 1.04 K/UL
LYMPHOCYTES NFR BLD AUTO: 12.7 %
MAN DIFF?: NORMAL
MCHC RBC-ENTMCNC: 30 PG
MCHC RBC-ENTMCNC: 35 GM/DL
MCV RBC AUTO: 85.8 FL
MONOCYTES # BLD AUTO: 0.86 K/UL
MONOCYTES NFR BLD AUTO: 10.5 %
NEUTROPHILS # BLD AUTO: 5.65 K/UL
NEUTROPHILS NFR BLD AUTO: 68.8 %
PLATELET # BLD AUTO: 248 K/UL
POTASSIUM SERPL-SCNC: 5.7 MMOL/L
PROT SERPL-MCNC: 6.3 G/DL
RBC # BLD: 4.36 M/UL
RBC # FLD: 13.8 %
SODIUM SERPL-SCNC: 132 MMOL/L
WBC # FLD AUTO: 8.2 K/UL

## 2018-09-29 LAB
ANION GAP SERPL CALC-SCNC: 11 MMOL/L
BUN SERPL-MCNC: 24 MG/DL
CALCIUM SERPL-MCNC: 8.8 MG/DL
CHLORIDE SERPL-SCNC: 97 MMOL/L
CO2 SERPL-SCNC: 25 MMOL/L
CREAT SERPL-MCNC: 1.42 MG/DL
POTASSIUM SERPL-SCNC: 5.5 MMOL/L
SODIUM SERPL-SCNC: 133 MMOL/L

## 2018-10-01 PROCEDURE — G9005: CPT

## 2018-10-02 LAB
LEVETIRACETAM SERPL-MCNC: 59 MCG/ML
OXCARBAZEPINE SERPL-MCNC: 31 UG/ML

## 2018-10-23 ENCOUNTER — APPOINTMENT (OUTPATIENT)
Dept: UROLOGY | Facility: CLINIC | Age: 73
End: 2018-10-23
Payer: MEDICARE

## 2018-10-23 DIAGNOSIS — N31.2 FLACCID NEUROPATHIC BLADDER, NOT ELSEWHERE CLASSIFIED: ICD-10-CM

## 2018-10-23 PROCEDURE — 99213 OFFICE O/P EST LOW 20 MIN: CPT | Mod: 25

## 2018-10-23 PROCEDURE — 81003 URINALYSIS AUTO W/O SCOPE: CPT | Mod: QW

## 2018-10-23 PROCEDURE — 51741 ELECTRO-UROFLOWMETRY FIRST: CPT

## 2018-10-23 PROCEDURE — 51798 US URINE CAPACITY MEASURE: CPT | Mod: 59

## 2018-11-12 ENCOUNTER — APPOINTMENT (OUTPATIENT)
Dept: UROLOGY | Facility: CLINIC | Age: 73
End: 2018-11-12

## 2018-11-13 ENCOUNTER — APPOINTMENT (OUTPATIENT)
Dept: UROLOGY | Facility: CLINIC | Age: 73
End: 2018-11-13
Payer: MEDICARE

## 2018-11-13 PROCEDURE — 99213 OFFICE O/P EST LOW 20 MIN: CPT

## 2018-11-20 ENCOUNTER — APPOINTMENT (OUTPATIENT)
Dept: NEUROLOGY | Facility: CLINIC | Age: 73
End: 2018-11-20
Payer: MEDICARE

## 2018-11-20 ENCOUNTER — APPOINTMENT (OUTPATIENT)
Dept: NEUROLOGY | Facility: CLINIC | Age: 73
End: 2018-11-20

## 2018-11-20 PROCEDURE — 95816 EEG AWAKE AND DROWSY: CPT

## 2018-11-20 PROCEDURE — 99214 OFFICE O/P EST MOD 30 MIN: CPT

## 2018-12-03 NOTE — PHYSICAL THERAPY INITIAL EVALUATION ADULT - AMBULATION SKILLS, REHAB EVAL
Nursing Note by Ibeth Buenrostro CMA at 10/22/18 08:22 AM     Author:  Ibeth Buenrostro CMA Service:  (none) Author Type:  Certified Medical Assistant     Filed:  10/22/18 08:22 AM Encounter Date:  10/22/2018 Status:  Signed     :  Ibeth Buenrostro CMA (Certified Medical Assistant)            If provider orders tests at today's visit, patient would like to be contacted via ([employer] or by telephone). If to contact patient by phone, patient's preferred phone # is [phone] (cell) and it is ok to leave message on voice mail or with family member. If medications are ordered at today's visit, the pharmacy name/location patient would like them to be sent to is [facility] [address][EL1.1T]      Revision History        User Key Date/Time User Provider Type Action    > EL1.1 10/22/18 08:22 AM Ibeth Buenrostro CMA Certified Medical Assistant Sign    T - Template            
independent
independent

## 2018-12-14 LAB
LEVETIRACETAM SERPL-MCNC: 45.9 MCG/ML
OXCARBAZEPINE SERPL-MCNC: 26 UG/ML

## 2019-01-08 ENCOUNTER — RX RENEWAL (OUTPATIENT)
Age: 74
End: 2019-01-08

## 2019-02-14 ENCOUNTER — APPOINTMENT (OUTPATIENT)
Dept: NEUROLOGY | Facility: CLINIC | Age: 74
End: 2019-02-14
Payer: MEDICARE

## 2019-02-14 VITALS
WEIGHT: 205 LBS | HEIGHT: 70.5 IN | DIASTOLIC BLOOD PRESSURE: 70 MMHG | HEART RATE: 68 BPM | BODY MASS INDEX: 29.02 KG/M2 | SYSTOLIC BLOOD PRESSURE: 140 MMHG

## 2019-02-14 DIAGNOSIS — R51 HEADACHE: ICD-10-CM

## 2019-02-14 PROCEDURE — 99214 OFFICE O/P EST MOD 30 MIN: CPT

## 2019-02-14 NOTE — HISTORY OF PRESENT ILLNESS
[FreeTextEntry1] : He is 73-year-old patient with history of partial complex seizures with secondary generalization and breakthrough seizures currently on multiple medications including Keppra, Trileptal,vimpat, and seizures controlled well. No new complaints currently. But complaining about tiredness and fatigue. Also vivid dreams at night time patient also takes beta blockers but not sure if he takes them at night time. No other focal symptoms. Last EEG did not show any significant changes.\par \par No headaches, no dizziness. Forgetfulness persisting. Last levels were in therapeutic range.

## 2019-02-14 NOTE — DISCUSSION/SUMMARY
[FreeTextEntry1] : Patient with partial complex seizures with secondary generalization stable without any breakthrough seizures.\par \par Continue current medications including the Vimpat, Keppra and Trileptal.\par \par Repeat labwork including levels.\par \par Repeat EEG did not show significant improvement or changes.\par \par Recommend patient to continue same medications and adjust the doses.\par \par Followup evaluation 3 months or as needed.\par \par Followup with you for other medical problems.\par \par Patient education provided regarding seizures and compliance.

## 2019-02-14 NOTE — REASON FOR VISIT
[Follow-Up: _____] : a [unfilled] follow-up visit [FreeTextEntry1] : Follow up for Seizure disorder with Break through seizures

## 2019-02-14 NOTE — PHYSICAL EXAM
[General Appearance - Alert] : alert [General Appearance - In No Acute Distress] : in no acute distress [General Appearance - Well Nourished] : well nourished [General Appearance - Well-Appearing] : healthy appearing [Affect] : the affect was normal [Person] : oriented to person [Place] : oriented to place [Time] : oriented to time [Short Term Intact] : short term memory intact [Remote Intact] : remote memory impaired [Registration Intact] : recent registration memory intact [Concentration Intact] : normal concentrating ability [Visual Intact] : visual attention was ~T not ~L decreased [Naming Objects] : no difficulty naming common objects [Repeating Phrases] : no difficulty repeating a phrase [Writing A Sentence] : no difficulty writing a sentence [Fluency] : fluency intact [Comprehension] : comprehension intact [Reading] : reading intact [Past History] : adequate knowledge of personal past history [Cranial Nerves Optic (II)] : visual acuity intact bilaterally,  visual fields full to confrontation, pupils equal round and reactive to light [Cranial Nerves Oculomotor (III)] : extraocular motion intact [Cranial Nerves Trigeminal (V)] : facial sensation intact symmetrically [Cranial Nerves Facial (VII)] : face symmetrical [Cranial Nerves Vestibulocochlear (VIII)] : hearing was intact bilaterally [Cranial Nerves Glossopharyngeal (IX)] : tongue and palate midline [Cranial Nerves Accessory (XI - Cranial And Spinal)] : head turning and shoulder shrug symmetric [Cranial Nerves Hypoglossal (XII)] : there was no tongue deviation with protrusion [Motor Strength] : muscle strength was normal in all four extremities [No Muscle Atrophy] : normal bulk in all four extremities [Motor Handedness Right-Handed] : the patient is right hand dominant [Sensation Tactile Decrease] : light touch was intact [Romberg's Sign] : a positive Romberg's sign was present [Limited Balance] : the patient's balance was impaired [Past-pointing] : there was no past-pointing [Tremor] : no tremor present [1+] : Patella left 1+ [0] : Ankle jerk left 0 [Plantar Reflex Right Only] : normal on the right [Plantar Reflex Left Only] : normal on the left [FreeTextEntry4] : verbose, recall 3/3 today. names 4/5 [FreeTextEntry8] : unsteady [Sclera] : the sclera and conjunctiva were normal [Outer Ear] : the ears and nose were normal in appearance [Hearing Threshold Finger Rub Not [place]] : hearing was normal [Oropharynx] : the oropharynx was normal [Neck Appearance] : the appearance of the neck was normal [Auscultation Breath Sounds / Voice Sounds] : lungs were clear to auscultation bilaterally [Heart Rate And Rhythm] : heart rate was normal and rhythm regular [Heart Sounds] : normal S1 and S2 [Heart Sounds Gallop] : no gallops [Murmurs] : no murmurs [Heart Sounds Pericardial Friction Rub] : no pericardial rub [Full Pulse] : the pedal pulses are present [Edema] : there was no peripheral edema [Bowel Sounds] : normal bowel sounds [Abdomen Soft] : soft [Abdomen Tenderness] : non-tender [Abdomen Mass (___ Cm)] : no abdominal mass palpated [No CVA Tenderness] : no ~M costovertebral angle tenderness [No Spinal Tenderness] : no spinal tenderness [Nail Clubbing] : no clubbing  or cyanosis of the fingernails [Musculoskeletal - Swelling] : no joint swelling seen [Motor Tone] : muscle strength and tone were normal [Skin Color & Pigmentation] : normal skin color and pigmentation [Skin Turgor] : normal skin turgor [] : no rash [FreeTextEntry1] : left flank port wine stain, large. some irritations on abd, arms, excoriation.

## 2019-02-14 NOTE — REVIEW OF SYSTEMS
[Memory Lapses or Loss] : no memory loss [Decr. Concentrating Ability] : decreased concentrating ability [Difficulty with Language] : no ~M difficulty with language [Changed Thought Patterns] : changed thought patterns [Numbness] : numbness [Tingling] : tingling [Seizures] : convulsions [As Noted in HPI] : as noted in HPI [Negative] : Heme/Lymph [FreeTextEntry4] : tinnitus

## 2019-02-20 ENCOUNTER — MEDICATION RENEWAL (OUTPATIENT)
Age: 74
End: 2019-02-20

## 2019-02-26 LAB
LEVETIRACETAM SERPL-MCNC: 52.2 MCG/ML
OXCARBAZEPINE SERPL-MCNC: 25 UG/ML

## 2019-03-12 ENCOUNTER — RESULT REVIEW (OUTPATIENT)
Age: 74
End: 2019-03-12

## 2019-03-12 ENCOUNTER — RX RENEWAL (OUTPATIENT)
Age: 74
End: 2019-03-12

## 2019-05-10 ENCOUNTER — MEDICATION RENEWAL (OUTPATIENT)
Age: 74
End: 2019-05-10

## 2019-05-14 ENCOUNTER — MEDICATION RENEWAL (OUTPATIENT)
Age: 74
End: 2019-05-14

## 2019-05-14 RX ORDER — OXCARBAZEPINE 150 MG/1
150 TABLET, FILM COATED ORAL
Refills: 0 | Status: DISCONTINUED | COMMUNITY
End: 2019-05-14

## 2019-05-16 ENCOUNTER — APPOINTMENT (OUTPATIENT)
Dept: NEUROLOGY | Facility: CLINIC | Age: 74
End: 2019-05-16
Payer: MEDICARE

## 2019-05-16 VITALS
DIASTOLIC BLOOD PRESSURE: 74 MMHG | HEART RATE: 74 BPM | HEIGHT: 70 IN | SYSTOLIC BLOOD PRESSURE: 142 MMHG | BODY MASS INDEX: 29.35 KG/M2 | WEIGHT: 205 LBS

## 2019-05-16 PROCEDURE — 99214 OFFICE O/P EST MOD 30 MIN: CPT

## 2019-05-16 RX ORDER — LEVETIRACETAM 750 MG/1
750 TABLET, FILM COATED ORAL
Refills: 0 | Status: DISCONTINUED | COMMUNITY
End: 2019-05-16

## 2019-05-16 NOTE — PHYSICAL EXAM
[General Appearance - Alert] : alert [General Appearance - In No Acute Distress] : in no acute distress [General Appearance - Well Nourished] : well nourished [General Appearance - Well-Appearing] : healthy appearing [Affect] : the affect was normal [Person] : oriented to person [Place] : oriented to place [Time] : oriented to time [Short Term Intact] : short term memory intact [Remote Intact] : remote memory impaired [Registration Intact] : recent registration memory intact [Concentration Intact] : normal concentrating ability [Visual Intact] : visual attention was ~T not ~L decreased [Naming Objects] : no difficulty naming common objects [Repeating Phrases] : no difficulty repeating a phrase [Writing A Sentence] : no difficulty writing a sentence [Fluency] : fluency intact [Comprehension] : comprehension intact [Reading] : reading intact [Past History] : adequate knowledge of personal past history [Cranial Nerves Optic (II)] : visual acuity intact bilaterally,  visual fields full to confrontation, pupils equal round and reactive to light [Cranial Nerves Oculomotor (III)] : extraocular motion intact [Cranial Nerves Trigeminal (V)] : facial sensation intact symmetrically [Cranial Nerves Facial (VII)] : face symmetrical [Cranial Nerves Vestibulocochlear (VIII)] : hearing was intact bilaterally [Cranial Nerves Glossopharyngeal (IX)] : tongue and palate midline [Cranial Nerves Accessory (XI - Cranial And Spinal)] : head turning and shoulder shrug symmetric [Cranial Nerves Hypoglossal (XII)] : there was no tongue deviation with protrusion [Motor Strength] : muscle strength was normal in all four extremities [No Muscle Atrophy] : normal bulk in all four extremities [Motor Handedness Right-Handed] : the patient is right hand dominant [Sensation Tactile Decrease] : light touch was intact [Romberg's Sign] : a positive Romberg's sign was present [Limited Balance] : the patient's balance was impaired [Past-pointing] : there was no past-pointing [Tremor] : no tremor present [1+] : Patella left 1+ [0] : Ankle jerk left 0 [Plantar Reflex Right Only] : normal on the right [Plantar Reflex Left Only] : normal on the left [FreeTextEntry4] : verbose, recall 3/3 today. names 4/5 [FreeTextEntry8] : unsteady [Sclera] : the sclera and conjunctiva were normal [Outer Ear] : the ears and nose were normal in appearance [Hearing Threshold Finger Rub Not [place]] : hearing was normal [Oropharynx] : the oropharynx was normal [Neck Appearance] : the appearance of the neck was normal [Auscultation Breath Sounds / Voice Sounds] : lungs were clear to auscultation bilaterally [Heart Rate And Rhythm] : heart rate was normal and rhythm regular [Heart Sounds] : normal S1 and S2 [Heart Sounds Gallop] : no gallops [Murmurs] : no murmurs [Heart Sounds Pericardial Friction Rub] : no pericardial rub [Full Pulse] : the pedal pulses are present [Edema] : there was no peripheral edema [Bowel Sounds] : normal bowel sounds [Abdomen Soft] : soft [Abdomen Tenderness] : non-tender [Abdomen Mass (___ Cm)] : no abdominal mass palpated [No CVA Tenderness] : no ~M costovertebral angle tenderness [No Spinal Tenderness] : no spinal tenderness [Nail Clubbing] : no clubbing  or cyanosis of the fingernails [Musculoskeletal - Swelling] : no joint swelling seen [Motor Tone] : muscle strength and tone were normal [Skin Color & Pigmentation] : normal skin color and pigmentation [Skin Turgor] : normal skin turgor [] : no rash [FreeTextEntry1] : left flank port wine stain, large. some irritations on abd, arms, excoriation.

## 2019-05-16 NOTE — DISCUSSION/SUMMARY
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 Including the levels.\par \par Recommend patient to get reports from your office.\par \par Continue Keppra, Trileptal and vimpat.\par \par Recommend COG testing.\par \par Patient needs full-time supervision along with medical care with his underlying metabolic causes with his ongoing medical problems.\par \par Followup evaluation in one month after workup is completed.\par \par Follow up with you for other medical problems.\par \par

## 2019-05-16 NOTE — HISTORY OF PRESENT ILLNESS
[FreeTextEntry1] : He is 74-year-old patient with a history of partial complex seizures with secondary generalizingtion with breakthrough seizures currently on Keppra, Trileptal, vimpat offers no complaints except for short-term memory loss and tiredness. No new complaints except forgetfulness. Also upset that  he received later from [employer] that is visits to cut from visitation from physicians. Wanted to know what he can do.\par \par Last lab work is upper normal Keppra levels and normal Trileptal levels. No side effects from the medications. No breakthrough  seizures. EEG did not reveal any seizure activity except bilateral slow activity.

## 2019-05-16 NOTE — REASON FOR VISIT
[Follow-Up: _____] : a [unfilled] follow-up visit [FreeTextEntry1] : Follow up for Pt with seizure disorder and break through seizures c/o short term memory loss

## 2019-05-16 NOTE — REVIEW OF SYSTEMS
[Memory Lapses or Loss] : memory loss [Decr. Concentrating Ability] : decreased concentrating ability [Difficulty with Language] : no ~M difficulty with language [Changed Thought Patterns] : no change in thought patterns [Numbness] : numbness [Tingling] : no tingling [Seizures] : convulsions [As Noted in HPI] : as noted in HPI [Negative] : Heme/Lymph [FreeTextEntry4] : tinnitus

## 2019-05-23 ENCOUNTER — FORM ENCOUNTER (OUTPATIENT)
Age: 74
End: 2019-05-23

## 2019-05-24 ENCOUNTER — APPOINTMENT (OUTPATIENT)
Dept: MRI IMAGING | Facility: CLINIC | Age: 74
End: 2019-05-24
Payer: MEDICARE

## 2019-05-24 ENCOUNTER — OUTPATIENT (OUTPATIENT)
Dept: OUTPATIENT SERVICES | Facility: HOSPITAL | Age: 74
LOS: 1 days | End: 2019-05-24
Payer: MEDICARE

## 2019-05-24 DIAGNOSIS — G31.84 MILD COGNITIVE IMPAIRMENT OF UNCERTAIN OR UNKNOWN ETIOLOGY: ICD-10-CM

## 2019-05-24 DIAGNOSIS — Z98.89 OTHER SPECIFIED POSTPROCEDURAL STATES: Chronic | ICD-10-CM

## 2019-05-24 PROCEDURE — 70551 MRI BRAIN STEM W/O DYE: CPT | Mod: 26

## 2019-05-24 PROCEDURE — 70551 MRI BRAIN STEM W/O DYE: CPT

## 2019-05-31 ENCOUNTER — OUTPATIENT (OUTPATIENT)
Dept: OUTPATIENT SERVICES | Facility: HOSPITAL | Age: 74
LOS: 1 days | End: 2019-05-31
Payer: MEDICARE

## 2019-05-31 ENCOUNTER — APPOINTMENT (OUTPATIENT)
Dept: CT IMAGING | Facility: CLINIC | Age: 74
End: 2019-05-31
Payer: MEDICARE

## 2019-05-31 DIAGNOSIS — Z00.8 ENCOUNTER FOR OTHER GENERAL EXAMINATION: ICD-10-CM

## 2019-05-31 DIAGNOSIS — Z98.89 OTHER SPECIFIED POSTPROCEDURAL STATES: Chronic | ICD-10-CM

## 2019-05-31 PROCEDURE — 70486 CT MAXILLOFACIAL W/O DYE: CPT | Mod: 26

## 2019-05-31 PROCEDURE — 70486 CT MAXILLOFACIAL W/O DYE: CPT

## 2019-06-12 LAB
CARBAMAZEPINE SERPL-MCNC: <2 UG/ML
LEVETIRACETAM SERPL-MCNC: 32.2 MCG/ML

## 2019-06-13 LAB — OXCARBAZEPINE SERPL-MCNC: 20 UG/ML

## 2019-06-20 ENCOUNTER — APPOINTMENT (OUTPATIENT)
Dept: NEUROLOGY | Facility: CLINIC | Age: 74
End: 2019-06-20
Payer: MEDICARE

## 2019-06-20 VITALS
WEIGHT: 205 LBS | DIASTOLIC BLOOD PRESSURE: 75 MMHG | SYSTOLIC BLOOD PRESSURE: 151 MMHG | BODY MASS INDEX: 29.35 KG/M2 | HEIGHT: 70 IN

## 2019-06-20 PROCEDURE — 99214 OFFICE O/P EST MOD 30 MIN: CPT

## 2019-06-20 PROCEDURE — 96132 NRPSYC TST EVAL PHYS/QHP 1ST: CPT | Mod: 26,59

## 2019-06-20 NOTE — DISCUSSION/SUMMARY
[FreeTextEntry1] : Patient with partial complex seizures with secondary generalization with progressive short-term memory loss.\par \par MRI did not reveal any acute pathology.\par \par Patient had  COG testing done.\par \par The global_cognitive score computed as average of the available index scores.\par \par When statistically compared to a group of cognitively healthy individuals of similar age and education, performance on the current testing session was:\par More than one standard deviation below average in the following cognitive domains: none\par Below-average in the following cognitive domains: none\par Above average in the following cognitive domains: Memory, executive function, attention, visual spatial.\par Global cognitive score: Above average 104.4.\par Memory:101\par Executive function: Are 101.1\par Attention:103.8\par Visual spatial:111.9\par \par Patient's course over normal or  above normal and overall did well.\par \par No other new complaints noted since last visit. Often slow compliant since last visit.\par \par Would not recommend any change of medications at this time. Would not add any new  medications.\par \par Followup evaluation recommended in 3 months.\par \par Followup with you for other medical problems.\par \par

## 2019-06-20 NOTE — REVIEW OF SYSTEMS
[Memory Lapses or Loss] : memory loss [Decr. Concentrating Ability] : decreased concentrating ability [Difficulty with Language] : no ~M difficulty with language [Changed Thought Patterns] : no change in thought patterns [Numbness] : numbness [Tingling] : no tingling [Seizures] : convulsions [As Noted in HPI] : as noted in HPI [FreeTextEntry4] : tinnitus [Negative] : Heme/Lymph

## 2019-06-20 NOTE — PHYSICAL EXAM
[General Appearance - Alert] : alert [General Appearance - Well Nourished] : well nourished [General Appearance - In No Acute Distress] : in no acute distress [Affect] : the affect was normal [General Appearance - Well-Appearing] : healthy appearing [Person] : oriented to person [Time] : oriented to time [Place] : oriented to place [Remote Intact] : remote memory impaired [Short Term Intact] : short term memory intact [Registration Intact] : recent registration memory intact [Visual Intact] : visual attention was ~T not ~L decreased [Concentration Intact] : normal concentrating ability [Repeating Phrases] : no difficulty repeating a phrase [Naming Objects] : no difficulty naming common objects [Writing A Sentence] : no difficulty writing a sentence [Comprehension] : comprehension intact [Fluency] : fluency intact [Reading] : reading intact [Cranial Nerves Optic (II)] : visual acuity intact bilaterally,  visual fields full to confrontation, pupils equal round and reactive to light [Past History] : adequate knowledge of personal past history [Cranial Nerves Trigeminal (V)] : facial sensation intact symmetrically [Cranial Nerves Oculomotor (III)] : extraocular motion intact [Cranial Nerves Facial (VII)] : face symmetrical [Cranial Nerves Glossopharyngeal (IX)] : tongue and palate midline [Cranial Nerves Vestibulocochlear (VIII)] : hearing was intact bilaterally [Cranial Nerves Accessory (XI - Cranial And Spinal)] : head turning and shoulder shrug symmetric [Cranial Nerves Hypoglossal (XII)] : there was no tongue deviation with protrusion [Motor Handedness Right-Handed] : the patient is right hand dominant [Motor Strength] : muscle strength was normal in all four extremities [No Muscle Atrophy] : normal bulk in all four extremities [Romberg's Sign] : a positive Romberg's sign was present [Sensation Tactile Decrease] : light touch was intact [Limited Balance] : the patient's balance was impaired [Past-pointing] : there was no past-pointing [Tremor] : no tremor present [0] : Ankle jerk left 0 [1+] : Patella left 1+ [Plantar Reflex Left Only] : normal on the left [Plantar Reflex Right Only] : normal on the right [FreeTextEntry8] : unsteady [FreeTextEntry4] : verbose, recall 3/3 today. names 4/5 [Sclera] : the sclera and conjunctiva were normal [Outer Ear] : the ears and nose were normal in appearance [Oropharynx] : the oropharynx was normal [Hearing Threshold Finger Rub Not [place]] : hearing was normal [Neck Appearance] : the appearance of the neck was normal [Heart Rate And Rhythm] : heart rate was normal and rhythm regular [Auscultation Breath Sounds / Voice Sounds] : lungs were clear to auscultation bilaterally [Heart Sounds Gallop] : no gallops [Murmurs] : no murmurs [Heart Sounds] : normal S1 and S2 [Full Pulse] : the pedal pulses are present [Heart Sounds Pericardial Friction Rub] : no pericardial rub [Bowel Sounds] : normal bowel sounds [Edema] : there was no peripheral edema [Abdomen Soft] : soft [Abdomen Tenderness] : non-tender [Abdomen Mass (___ Cm)] : no abdominal mass palpated [No CVA Tenderness] : no ~M costovertebral angle tenderness [No Spinal Tenderness] : no spinal tenderness [Musculoskeletal - Swelling] : no joint swelling seen [Nail Clubbing] : no clubbing  or cyanosis of the fingernails [Motor Tone] : muscle strength and tone were normal [Skin Turgor] : normal skin turgor [Skin Color & Pigmentation] : normal skin color and pigmentation [FreeTextEntry1] : left flank port wine stain, large. some irritations on abd, arms, excoriation. [] : no rash

## 2019-06-20 NOTE — HISTORY OF PRESENT ILLNESS
[FreeTextEntry1] : He is 74-year-old patient coming here for followup evaluation for memory loss and  COG testing today with history of's partial complex seizures with breakthrough seizures on multiple medications.\par \par Patient complained about forgetfulness and progressive memory loss for which she had memory testing today.\par \par  No new complaint since last visit completed test without any problems.

## 2019-06-20 NOTE — REASON FOR VISIT
[Follow-Up: _____] : a [unfilled] follow-up visit [FreeTextEntry1] : Follow up after COG testing  for memory loss

## 2019-07-11 ENCOUNTER — RX RENEWAL (OUTPATIENT)
Age: 74
End: 2019-07-11

## 2019-08-12 ENCOUNTER — MEDICATION RENEWAL (OUTPATIENT)
Age: 74
End: 2019-08-12

## 2019-08-20 ENCOUNTER — MEDICATION RENEWAL (OUTPATIENT)
Age: 74
End: 2019-08-20

## 2019-08-27 ENCOUNTER — APPOINTMENT (OUTPATIENT)
Dept: UROLOGY | Facility: CLINIC | Age: 74
End: 2019-08-27

## 2019-09-09 ENCOUNTER — RX RENEWAL (OUTPATIENT)
Age: 74
End: 2019-09-09

## 2019-09-20 ENCOUNTER — APPOINTMENT (OUTPATIENT)
Dept: NEUROLOGY | Facility: CLINIC | Age: 74
End: 2019-09-20
Payer: MEDICARE

## 2019-09-20 VITALS
BODY MASS INDEX: 28.63 KG/M2 | HEIGHT: 70 IN | SYSTOLIC BLOOD PRESSURE: 155 MMHG | HEART RATE: 60 BPM | WEIGHT: 200 LBS | DIASTOLIC BLOOD PRESSURE: 87 MMHG

## 2019-09-20 PROCEDURE — 99214 OFFICE O/P EST MOD 30 MIN: CPT

## 2019-09-20 NOTE — REASON FOR VISIT
[Follow-Up: _____] : a [unfilled] follow-up visit [FreeTextEntry1] : Follow up for pt with Seizure disorder

## 2019-09-20 NOTE — PHYSICAL EXAM
[General Appearance - Alert] : alert [General Appearance - In No Acute Distress] : in no acute distress [General Appearance - Well Nourished] : well nourished [General Appearance - Well-Appearing] : healthy appearing [Affect] : the affect was normal [Person] : oriented to person [Place] : oriented to place [Time] : oriented to time [Short Term Intact] : short term memory intact [Remote Intact] : remote memory impaired [Registration Intact] : recent registration memory intact [Concentration Intact] : normal concentrating ability [Visual Intact] : visual attention was ~T not ~L decreased [Naming Objects] : no difficulty naming common objects [Repeating Phrases] : no difficulty repeating a phrase [Writing A Sentence] : no difficulty writing a sentence [Fluency] : fluency intact [Comprehension] : comprehension intact [Reading] : reading intact [Past History] : adequate knowledge of personal past history [Cranial Nerves Optic (II)] : visual acuity intact bilaterally,  visual fields full to confrontation, pupils equal round and reactive to light [Cranial Nerves Oculomotor (III)] : extraocular motion intact [Cranial Nerves Trigeminal (V)] : facial sensation intact symmetrically [Cranial Nerves Facial (VII)] : face symmetrical [Cranial Nerves Vestibulocochlear (VIII)] : hearing was intact bilaterally [Cranial Nerves Glossopharyngeal (IX)] : tongue and palate midline [Cranial Nerves Accessory (XI - Cranial And Spinal)] : head turning and shoulder shrug symmetric [Cranial Nerves Hypoglossal (XII)] : there was no tongue deviation with protrusion [Motor Strength] : muscle strength was normal in all four extremities [No Muscle Atrophy] : normal bulk in all four extremities [Motor Handedness Right-Handed] : the patient is right hand dominant [Sensation Tactile Decrease] : light touch was intact [Romberg's Sign] : a positive Romberg's sign was present [Limited Balance] : the patient's balance was impaired [Past-pointing] : there was no past-pointing [Tremor] : no tremor present [1+] : Patella left 1+ [0] : Ankle jerk left 0 [Plantar Reflex Right Only] : normal on the right [Plantar Reflex Left Only] : normal on the left [FreeTextEntry4] : verbose, recall 3/3 today. names 4/5 [FreeTextEntry8] : unsteady [Sclera] : the sclera and conjunctiva were normal [Outer Ear] : the ears and nose were normal in appearance [Hearing Threshold Finger Rub Not [place]] : hearing was normal [Oropharynx] : the oropharynx was normal [Neck Appearance] : the appearance of the neck was normal [Auscultation Breath Sounds / Voice Sounds] : lungs were clear to auscultation bilaterally [Heart Rate And Rhythm] : heart rate was normal and rhythm regular [Heart Sounds] : normal S1 and S2 [Heart Sounds Gallop] : no gallops [Murmurs] : no murmurs [Heart Sounds Pericardial Friction Rub] : no pericardial rub [Full Pulse] : the pedal pulses are present [Edema] : there was no peripheral edema [Bowel Sounds] : normal bowel sounds [Abdomen Soft] : soft [Abdomen Tenderness] : non-tender [Abdomen Mass (___ Cm)] : no abdominal mass palpated [No CVA Tenderness] : no ~M costovertebral angle tenderness [No Spinal Tenderness] : no spinal tenderness [Nail Clubbing] : no clubbing  or cyanosis of the fingernails [Musculoskeletal - Swelling] : no joint swelling seen [Motor Tone] : muscle strength and tone were normal [Skin Color & Pigmentation] : normal skin color and pigmentation [Skin Turgor] : normal skin turgor [] : no rash [FreeTextEntry1] : left flank port wine stain, large. some irritations on abd, arms, excoriation.

## 2019-09-20 NOTE — HISTORY OF PRESENT ILLNESS
[FreeTextEntry1] : A 74-year-old patient coming here for followup evaluation seizure disorder and mild cognitive impairment with short-term memory loss with a negative workup . Denies of a new complaint since last visit. Currently on Trileptal, vimpat, Keppra tolerated medications well. Last levels were adequate in therapeutic range.

## 2019-09-20 NOTE — DISCUSSION/SUMMARY
[FreeTextEntry1] : Patient with partial complex seizures recent generalization with mild cognitive impairment .\par MRI scan and cog testing did not reveal any pathology.\par Recommend continue present medications.\par Recommend repeat lab work including levels.\par Recommend patient to continue exercises as tolerated..\par Followup with you for other medical problems.\par Return for reevaluation in 3 months or as needed.\par \par

## 2019-09-25 ENCOUNTER — APPOINTMENT (OUTPATIENT)
Dept: NEUROLOGY | Facility: CLINIC | Age: 74
End: 2019-09-25
Payer: MEDICARE

## 2019-09-25 PROCEDURE — 95816 EEG AWAKE AND DROWSY: CPT

## 2019-10-10 ENCOUNTER — MEDICATION RENEWAL (OUTPATIENT)
Age: 74
End: 2019-10-10

## 2019-11-04 ENCOUNTER — MEDICATION RENEWAL (OUTPATIENT)
Age: 74
End: 2019-11-04

## 2019-11-20 ENCOUNTER — APPOINTMENT (OUTPATIENT)
Dept: UROLOGY | Facility: CLINIC | Age: 74
End: 2019-11-20
Payer: MEDICARE

## 2019-11-20 VITALS
OXYGEN SATURATION: 97 % | BODY MASS INDEX: 28 KG/M2 | SYSTOLIC BLOOD PRESSURE: 145 MMHG | HEIGHT: 71 IN | DIASTOLIC BLOOD PRESSURE: 82 MMHG | WEIGHT: 200 LBS | HEART RATE: 58 BPM

## 2019-11-20 PROCEDURE — 51798 US URINE CAPACITY MEASURE: CPT

## 2019-11-20 PROCEDURE — 99213 OFFICE O/P EST LOW 20 MIN: CPT | Mod: 25

## 2019-11-20 NOTE — ASSESSMENT
[FreeTextEntry1] : This patient is doing well. He will stop Bethanechol and continue his Oxybutynin. He will return to the office for uroflow in 1 month.

## 2019-11-20 NOTE — HISTORY OF PRESENT ILLNESS
[FreeTextEntry1] : This patient is here for a follow up, he has no voiding or urinary complaints. He states he is taking Bethanechol and Oxybutynin with good effect. His post void residual is 59cc after urinating 1 hour ago.

## 2019-11-20 NOTE — PHYSICAL EXAM
[General Appearance - Well Developed] : well developed [General Appearance - Well Nourished] : well nourished [Normal Appearance] : normal appearance [Well Groomed] : well groomed [General Appearance - In No Acute Distress] : no acute distress [Abdomen Soft] : soft [Abdomen Tenderness] : non-tender [Costovertebral Angle Tenderness] : no ~M costovertebral angle tenderness [Urinary Bladder Findings] : the bladder was normal on palpation [Prostate Absent] : was absent [No Prostate Nodules] : no prostate nodules [Edema] : no peripheral edema [Respiration, Rhythm And Depth] : normal respiratory rhythm and effort [] : no respiratory distress [Oriented To Time, Place, And Person] : oriented to person, place, and time [Affect] : the affect was normal [Exaggerated Use Of Accessory Muscles For Inspiration] : no accessory muscle use [Not Anxious] : not anxious [Mood] : the mood was normal [Normal Station and Gait] : the gait and station were normal for the patient's age [No Focal Deficits] : no focal deficits [No Palpable Adenopathy] : no palpable adenopathy

## 2019-11-25 ENCOUNTER — APPOINTMENT (OUTPATIENT)
Dept: NEUROLOGY | Facility: CLINIC | Age: 74
End: 2019-11-25
Payer: MEDICARE

## 2019-11-25 VITALS
BODY MASS INDEX: 28.84 KG/M2 | WEIGHT: 206 LBS | HEIGHT: 71 IN | HEART RATE: 62 BPM | SYSTOLIC BLOOD PRESSURE: 147 MMHG | DIASTOLIC BLOOD PRESSURE: 77 MMHG

## 2019-11-25 PROCEDURE — 99214 OFFICE O/P EST MOD 30 MIN: CPT

## 2019-11-25 NOTE — HISTORY OF PRESENT ILLNESS
[FreeTextEntry1] : He is 74-year-old patient coming here for followup evaluation for seizure disorder with breakthrough seizures but short-term memory loss currently taking oxcarbazepine 300 mg one half tablets twice a day and Keppra 500 mg 2 tablets twice a day and the Vimpat 50 mg in the morning 100 mg at bedtime stable neurologically without any breakthrough seizures.\par Still complains about short-term memory loss without any new complaints. Mild dizziness intermittently. Hard of hearing does not very hearing aids. No focal symptoms.

## 2019-11-25 NOTE — PHYSICAL EXAM
[General Appearance - Alert] : alert [General Appearance - In No Acute Distress] : in no acute distress [General Appearance - Well Nourished] : well nourished [Affect] : the affect was normal [General Appearance - Well-Appearing] : healthy appearing [Time] : oriented to time [Person] : oriented to person [Place] : oriented to place [Remote Intact] : remote memory impaired [Short Term Intact] : short term memory intact [Concentration Intact] : normal concentrating ability [Registration Intact] : recent registration memory intact [Visual Intact] : visual attention was ~T not ~L decreased [Naming Objects] : no difficulty naming common objects [Repeating Phrases] : no difficulty repeating a phrase [Writing A Sentence] : no difficulty writing a sentence [Fluency] : fluency intact [Past History] : adequate knowledge of personal past history [Comprehension] : comprehension intact [Reading] : reading intact [Cranial Nerves Trigeminal (V)] : facial sensation intact symmetrically [Cranial Nerves Oculomotor (III)] : extraocular motion intact [Cranial Nerves Optic (II)] : visual acuity intact bilaterally,  visual fields full to confrontation, pupils equal round and reactive to light [Cranial Nerves Accessory (XI - Cranial And Spinal)] : head turning and shoulder shrug symmetric [Cranial Nerves Vestibulocochlear (VIII)] : hearing was intact bilaterally [Cranial Nerves Glossopharyngeal (IX)] : tongue and palate midline [Cranial Nerves Facial (VII)] : face symmetrical [Cranial Nerves Hypoglossal (XII)] : there was no tongue deviation with protrusion [Motor Strength] : muscle strength was normal in all four extremities [No Muscle Atrophy] : normal bulk in all four extremities [Motor Handedness Right-Handed] : the patient is right hand dominant [Sensation Tactile Decrease] : light touch was intact [Limited Balance] : the patient's balance was impaired [Romberg's Sign] : a positive Romberg's sign was present [Tremor] : no tremor present [Past-pointing] : there was no past-pointing [1+] : Patella left 1+ [0] : Ankle jerk left 0 [Plantar Reflex Left Only] : normal on the left [Plantar Reflex Right Only] : normal on the right [FreeTextEntry4] : verbose, recall 3/3 today. names 4/5 [FreeTextEntry8] : unsteady [Sclera] : the sclera and conjunctiva were normal [Outer Ear] : the ears and nose were normal in appearance [Oropharynx] : the oropharynx was normal [Neck Appearance] : the appearance of the neck was normal [Auscultation Breath Sounds / Voice Sounds] : lungs were clear to auscultation bilaterally [Heart Sounds] : normal S1 and S2 [Heart Rate And Rhythm] : heart rate was normal and rhythm regular [Murmurs] : no murmurs [Heart Sounds Gallop] : no gallops [Heart Sounds Pericardial Friction Rub] : no pericardial rub [Full Pulse] : the pedal pulses are present [Edema] : there was no peripheral edema [Bowel Sounds] : normal bowel sounds [Abdomen Soft] : soft [Abdomen Tenderness] : non-tender [No CVA Tenderness] : no ~M costovertebral angle tenderness [Abdomen Mass (___ Cm)] : no abdominal mass palpated [No Spinal Tenderness] : no spinal tenderness [Musculoskeletal - Swelling] : no joint swelling seen [Nail Clubbing] : no clubbing  or cyanosis of the fingernails [Skin Color & Pigmentation] : normal skin color and pigmentation [Motor Tone] : muscle strength and tone were normal [] : no rash [FreeTextEntry1] : left flank port wine stain, large. some irritations on abd, arms, excoriation. [Skin Turgor] : normal skin turgor

## 2019-11-25 NOTE — REASON FOR VISIT
[FreeTextEntry1] : Follow up for pt with Seizure disorder on meds stable [Follow-Up: _____] : a [unfilled] follow-up visit

## 2019-11-25 NOTE — DISCUSSION/SUMMARY
[FreeTextEntry1] : Patient partial complex seizures with breakthrough seizure stable last visit stable currently on the medications.\par \par Continue current medications same doses.\par \par Recommend lab work to check the levels.\par \par Follow up with you for other medical problems.\par \par Adequate control of blood sugars.\par \par Exercises tolerated.\par \par Return for reevaluation in 3-4 months or as needed.\par \par Patient education provided regarding medications and side effects.\par

## 2019-11-25 NOTE — REVIEW OF SYSTEMS
[Memory Lapses or Loss] : memory loss [Decr. Concentrating Ability] : decreased concentrating ability [Changed Thought Patterns] : no change in thought patterns [Numbness] : numbness [Difficulty with Language] : no ~M difficulty with language [Tingling] : no tingling [As Noted in HPI] : as noted in HPI [Seizures] : convulsions [Loss Of Hearing] : hearing loss [Negative] : Heme/Lymph [FreeTextEntry4] : tinnitus/[place]

## 2019-12-04 ENCOUNTER — LABORATORY RESULT (OUTPATIENT)
Age: 74
End: 2019-12-04

## 2019-12-05 NOTE — INPATIENT CERTIFICATION FOR MEDICARE PATIENTS - PHYSICIAN CONCUR
I concur with the Admission Order and I certify that services are provided in accordance with Section 42 CFR § 412.3
spouse

## 2019-12-07 LAB
APPEARANCE: CLEAR
BACTERIA UR CULT: NORMAL
BACTERIA: NEGATIVE
BILIRUBIN URINE: NEGATIVE
BLOOD URINE: NEGATIVE
CALCIUM OXALATE CRYSTALS: ABNORMAL
COLOR: ABNORMAL
GLUCOSE QUALITATIVE U: NEGATIVE
HYALINE CASTS: 5 /LPF
KETONES URINE: NORMAL
LEUKOCYTE ESTERASE URINE: NEGATIVE
MICROSCOPIC-UA: NORMAL
NITRITE URINE: NEGATIVE
PH URINE: 6.5
PROTEIN URINE: ABNORMAL
PSA SERPL-MCNC: 0.9 NG/ML
RED BLOOD CELLS URINE: 3 /HPF
SPECIFIC GRAVITY URINE: >=1.03
SQUAMOUS EPITHELIAL CELLS: 0 /HPF
URINE CYTOLOGY: NORMAL
UROBILINOGEN URINE: NORMAL
WHITE BLOOD CELLS URINE: 2 /HPF

## 2019-12-10 LAB
BASOPHILS # BLD AUTO: 0.05 K/UL
BASOPHILS NFR BLD AUTO: 0.7 %
EOSINOPHIL # BLD AUTO: 0.4 K/UL
EOSINOPHIL NFR BLD AUTO: 5.7 %
HCT VFR BLD CALC: 40.1 %
HGB BLD-MCNC: 14 G/DL
IMM GRANULOCYTES NFR BLD AUTO: 0.6 %
LEVETIRACETAM SERPL-MCNC: 52.5 MCG/ML
LYMPHOCYTES # BLD AUTO: 1.16 K/UL
LYMPHOCYTES NFR BLD AUTO: 16.6 %
MAN DIFF?: NORMAL
MCHC RBC-ENTMCNC: 30.7 PG
MCHC RBC-ENTMCNC: 34.9 GM/DL
MCV RBC AUTO: 87.9 FL
MONOCYTES # BLD AUTO: 0.58 K/UL
MONOCYTES NFR BLD AUTO: 8.3 %
NEUTROPHILS # BLD AUTO: 4.76 K/UL
NEUTROPHILS NFR BLD AUTO: 68.1 %
OXCARBAZEPINE SERPL-MCNC: 21 UG/ML
PLATELET # BLD AUTO: 283 K/UL
RBC # BLD: 4.56 M/UL
RBC # FLD: 14.1 %
WBC # FLD AUTO: 6.99 K/UL

## 2019-12-18 ENCOUNTER — APPOINTMENT (OUTPATIENT)
Dept: UROLOGY | Facility: CLINIC | Age: 74
End: 2019-12-18
Payer: MEDICARE

## 2019-12-18 PROCEDURE — 51798 US URINE CAPACITY MEASURE: CPT | Mod: 59

## 2019-12-18 PROCEDURE — 99213 OFFICE O/P EST LOW 20 MIN: CPT | Mod: 25

## 2019-12-18 PROCEDURE — 51741 ELECTRO-UROFLOWMETRY FIRST: CPT

## 2019-12-19 NOTE — HISTORY OF PRESENT ILLNESS
[FreeTextEntry1] : This patient presents for evaluation of the results of his medical therapy. His residual urine has dropped down to zero at this time the flow study shows good improvement

## 2019-12-19 NOTE — PHYSICAL EXAM
[General Appearance - Well Developed] : well developed [General Appearance - Well Nourished] : well nourished [Normal Appearance] : normal appearance [Well Groomed] : well groomed [General Appearance - In No Acute Distress] : no acute distress [Abdomen Soft] : soft [Abdomen Tenderness] : non-tender [Costovertebral Angle Tenderness] : no ~M costovertebral angle tenderness [No Prostate Nodules] : no prostate nodules [Urinary Bladder Findings] : the bladder was normal on palpation [Prostate Absent] : was absent [Edema] : no peripheral edema [] : no respiratory distress [Respiration, Rhythm And Depth] : normal respiratory rhythm and effort [Exaggerated Use Of Accessory Muscles For Inspiration] : no accessory muscle use [Oriented To Time, Place, And Person] : oriented to person, place, and time [Affect] : the affect was normal [Mood] : the mood was normal [Not Anxious] : not anxious [Normal Station and Gait] : the gait and station were normal for the patient's age [No Focal Deficits] : no focal deficits [No Palpable Adenopathy] : no palpable adenopathy

## 2020-02-10 ENCOUNTER — APPOINTMENT (OUTPATIENT)
Dept: NEUROLOGY | Facility: CLINIC | Age: 75
End: 2020-02-10
Payer: MEDICARE

## 2020-02-10 VITALS
HEART RATE: 60 BPM | SYSTOLIC BLOOD PRESSURE: 150 MMHG | BODY MASS INDEX: 28 KG/M2 | HEIGHT: 71 IN | DIASTOLIC BLOOD PRESSURE: 80 MMHG | WEIGHT: 200 LBS

## 2020-02-10 PROCEDURE — 99214 OFFICE O/P EST MOD 30 MIN: CPT

## 2020-02-10 NOTE — PHYSICAL EXAM
[General Appearance - Alert] : alert [General Appearance - In No Acute Distress] : in no acute distress [General Appearance - Well-Appearing] : healthy appearing [General Appearance - Well Nourished] : well nourished [Affect] : the affect was normal [Place] : oriented to place [Person] : oriented to person [Time] : oriented to time [Short Term Intact] : short term memory intact [Registration Intact] : recent registration memory intact [Remote Intact] : remote memory impaired [Concentration Intact] : normal concentrating ability [Visual Intact] : visual attention was ~T not ~L decreased [Naming Objects] : no difficulty naming common objects [Repeating Phrases] : no difficulty repeating a phrase [Writing A Sentence] : no difficulty writing a sentence [Fluency] : fluency intact [Past History] : adequate knowledge of personal past history [Comprehension] : comprehension intact [Reading] : reading intact [Cranial Nerves Optic (II)] : visual acuity intact bilaterally,  visual fields full to confrontation, pupils equal round and reactive to light [Cranial Nerves Oculomotor (III)] : extraocular motion intact [Cranial Nerves Trigeminal (V)] : facial sensation intact symmetrically [Cranial Nerves Vestibulocochlear (VIII)] : hearing was intact bilaterally [Cranial Nerves Facial (VII)] : face symmetrical [Cranial Nerves Glossopharyngeal (IX)] : tongue and palate midline [Cranial Nerves Hypoglossal (XII)] : there was no tongue deviation with protrusion [Cranial Nerves Accessory (XI - Cranial And Spinal)] : head turning and shoulder shrug symmetric [Motor Strength] : muscle strength was normal in all four extremities [No Muscle Atrophy] : normal bulk in all four extremities [Motor Handedness Right-Handed] : the patient is right hand dominant [Romberg's Sign] : a positive Romberg's sign was present [Limited Balance] : the patient's balance was impaired [Sensation Tactile Decrease] : light touch was intact [Past-pointing] : there was no past-pointing [Tremor] : no tremor present [1+] : Patella left 1+ [Plantar Reflex Right Only] : normal on the right [0] : Ankle jerk left 0 [FreeTextEntry4] : verbose, recall 3/3 today. names 4/5 [Plantar Reflex Left Only] : normal on the left [Sclera] : the sclera and conjunctiva were normal [FreeTextEntry8] : unsteady [Outer Ear] : the ears and nose were normal in appearance [Neck Appearance] : the appearance of the neck was normal [Oropharynx] : the oropharynx was normal [Heart Rate And Rhythm] : heart rate was normal and rhythm regular [Auscultation Breath Sounds / Voice Sounds] : lungs were clear to auscultation bilaterally [Heart Sounds] : normal S1 and S2 [Murmurs] : no murmurs [Heart Sounds Gallop] : no gallops [Edema] : there was no peripheral edema [Full Pulse] : the pedal pulses are present [Heart Sounds Pericardial Friction Rub] : no pericardial rub [Bowel Sounds] : normal bowel sounds [Abdomen Soft] : soft [Abdomen Tenderness] : non-tender [Abdomen Mass (___ Cm)] : no abdominal mass palpated [No CVA Tenderness] : no ~M costovertebral angle tenderness [No Spinal Tenderness] : no spinal tenderness [Nail Clubbing] : no clubbing  or cyanosis of the fingernails [Musculoskeletal - Swelling] : no joint swelling seen [Motor Tone] : muscle strength and tone were normal [Skin Color & Pigmentation] : normal skin color and pigmentation [Skin Turgor] : normal skin turgor [] : no rash [FreeTextEntry1] : left flank port wine stain, large. some irritations on abd, arms, excoriation.

## 2020-02-10 NOTE — REASON FOR VISIT
[Follow-Up: _____] : a [unfilled] follow-up visit [FreeTextEntry1] : Follow up for pt with  Known seizure disorder coming for follow up

## 2020-02-10 NOTE — HISTORY OF PRESENT ILLNESS
[FreeTextEntry1] : He is 74-year-old patient coming here for followup evaluation for partial complex seizures with breakthrough seizures complaining about increasing short-term memory loss currently on multiple medications including oxcarbazepine for 50 mg twice a day and Keppra thousand milligrams twice a day milk and 50 mg in the morning and 100 mg at bedtime stable neurologically complaining about increasing memory loss and also forgetfulness and nighttime confusion and memory loss. Also complaining about disturbance sleep and talking to people that were not there even though he is wide awake during that time not hallucinating. No focal weakness associated with it sometimes he feels like his night walking. Unsteady gait at times. No headaches no dizziness.\par \par Recently had tooth extraction or work done at [facility] last week and complaining about pain focally in the left jaw.

## 2020-02-10 NOTE — DISCUSSION/SUMMARY
[FreeTextEntry1] : Patient with partial complex seizures with breakthrough seizures stable until recently complained about increasing forgetfulness and memory loss and nighttime confusion rule out focal seizures.\par \par Recommend patient to have lab work.\par \par Followup with dental evaluation for underlying infection.\par \par Consider EMU admission for video EEG monitoring for evaluation of recurrent seizures. An adequate evaluation and treatment plan.\par \par Discuss with Dr. Varghese.\par \par Followup evaluation in 2-3 months after above evaluation is done.\par \par Continue present medications at this time.

## 2020-02-10 NOTE — REVIEW OF SYSTEMS
[Memory Lapses or Loss] : memory loss [Decr. Concentrating Ability] : decreased concentrating ability [Changed Thought Patterns] : no change in thought patterns [Numbness] : numbness [Difficulty with Language] : no ~M difficulty with language [Seizures] : convulsions [Tingling] : no tingling [As Noted in HPI] : as noted in HPI [Loss Of Hearing] : hearing loss [Negative] : Heme/Lymph [FreeTextEntry4] : tinnitus/[place]

## 2020-02-24 RX ORDER — AMOXICILLIN 250 MG/5ML
1 SUSPENSION, RECONSTITUTED, ORAL (ML) ORAL
Qty: 0 | Refills: 0 | DISCHARGE
Start: 2020-02-24

## 2020-03-01 ENCOUNTER — OUTPATIENT (OUTPATIENT)
Dept: OUTPATIENT SERVICES | Facility: HOSPITAL | Age: 75
LOS: 1 days | End: 2020-03-01
Payer: MEDICARE

## 2020-03-01 DIAGNOSIS — Z98.89 OTHER SPECIFIED POSTPROCEDURAL STATES: Chronic | ICD-10-CM

## 2020-03-02 ENCOUNTER — INPATIENT (INPATIENT)
Facility: HOSPITAL | Age: 75
LOS: 2 days | Discharge: HOME | DRG: 101 | End: 2020-03-05
Attending: PSYCHIATRY & NEUROLOGY | Admitting: PSYCHIATRY & NEUROLOGY
Payer: MEDICARE

## 2020-03-02 VITALS
TEMPERATURE: 98 F | DIASTOLIC BLOOD PRESSURE: 69 MMHG | WEIGHT: 226.19 LBS | HEART RATE: 59 BPM | RESPIRATION RATE: 16 BRPM | SYSTOLIC BLOOD PRESSURE: 152 MMHG

## 2020-03-02 DIAGNOSIS — G40.919 EPILEPSY, UNSPECIFIED, INTRACTABLE, WITHOUT STATUS EPILEPTICUS: ICD-10-CM

## 2020-03-02 DIAGNOSIS — G40.209 LOCALIZATION-RELATED (FOCAL) (PARTIAL) SYMPTOMATIC EPILEPSY AND EPILEPTIC SYNDROMES WITH COMPLEX PARTIAL SEIZURES, NOT INTRACTABLE, WITHOUT STATUS EPILEPTICUS: ICD-10-CM

## 2020-03-02 DIAGNOSIS — Z98.89 OTHER SPECIFIED POSTPROCEDURAL STATES: Chronic | ICD-10-CM

## 2020-03-02 LAB
ALBUMIN SERPL ELPH-MCNC: 3.2 G/DL — LOW (ref 3.3–5)
ALP SERPL-CCNC: 81 U/L — SIGNIFICANT CHANGE UP (ref 40–120)
ALT FLD-CCNC: 17 U/L — SIGNIFICANT CHANGE UP (ref 12–78)
ANION GAP SERPL CALC-SCNC: 4 MMOL/L — LOW (ref 5–17)
AST SERPL-CCNC: 18 U/L — SIGNIFICANT CHANGE UP (ref 15–37)
BILIRUB SERPL-MCNC: 0.3 MG/DL — SIGNIFICANT CHANGE UP (ref 0.2–1.2)
BUN SERPL-MCNC: 33 MG/DL — HIGH (ref 7–23)
CALCIUM SERPL-MCNC: 8.4 MG/DL — LOW (ref 8.5–10.1)
CHLORIDE SERPL-SCNC: 109 MMOL/L — HIGH (ref 96–108)
CO2 SERPL-SCNC: 28 MMOL/L — SIGNIFICANT CHANGE UP (ref 22–31)
CREAT SERPL-MCNC: 1.63 MG/DL — HIGH (ref 0.5–1.3)
GLUCOSE SERPL-MCNC: 133 MG/DL — HIGH (ref 70–99)
HCT VFR BLD CALC: 34.5 % — LOW (ref 39–50)
HGB BLD-MCNC: 12 G/DL — LOW (ref 13–17)
MCHC RBC-ENTMCNC: 30.4 PG — SIGNIFICANT CHANGE UP (ref 27–34)
MCHC RBC-ENTMCNC: 34.8 GM/DL — SIGNIFICANT CHANGE UP (ref 32–36)
MCV RBC AUTO: 87.3 FL — SIGNIFICANT CHANGE UP (ref 80–100)
PLATELET # BLD AUTO: 199 K/UL — SIGNIFICANT CHANGE UP (ref 150–400)
POTASSIUM SERPL-MCNC: 4.8 MMOL/L — SIGNIFICANT CHANGE UP (ref 3.5–5.3)
POTASSIUM SERPL-SCNC: 4.8 MMOL/L — SIGNIFICANT CHANGE UP (ref 3.5–5.3)
PROT SERPL-MCNC: 6.3 GM/DL — SIGNIFICANT CHANGE UP (ref 6–8.3)
RBC # BLD: 3.95 M/UL — LOW (ref 4.2–5.8)
RBC # FLD: 13.9 % — SIGNIFICANT CHANGE UP (ref 10.3–14.5)
SODIUM SERPL-SCNC: 141 MMOL/L — SIGNIFICANT CHANGE UP (ref 135–145)
WBC # BLD: 7.5 K/UL — SIGNIFICANT CHANGE UP (ref 3.8–10.5)
WBC # FLD AUTO: 7.5 K/UL — SIGNIFICANT CHANGE UP (ref 3.8–10.5)

## 2020-03-02 PROCEDURE — 95700 EEG CONT REC W/VID EEG TECH: CPT

## 2020-03-02 PROCEDURE — 95816 EEG AWAKE AND DROWSY: CPT

## 2020-03-02 PROCEDURE — 80061 LIPID PANEL: CPT

## 2020-03-02 PROCEDURE — 80048 BASIC METABOLIC PNL TOTAL CA: CPT

## 2020-03-02 PROCEDURE — 82962 GLUCOSE BLOOD TEST: CPT

## 2020-03-02 PROCEDURE — 95816 EEG AWAKE AND DROWSY: CPT | Mod: 26

## 2020-03-02 PROCEDURE — 80053 COMPREHEN METABOLIC PANEL: CPT

## 2020-03-02 PROCEDURE — 95714 VEEG EA 12-26 HR UNMNTR: CPT

## 2020-03-02 PROCEDURE — 83036 HEMOGLOBIN GLYCOSYLATED A1C: CPT

## 2020-03-02 PROCEDURE — 99222 1ST HOSP IP/OBS MODERATE 55: CPT

## 2020-03-02 PROCEDURE — 36415 COLL VENOUS BLD VENIPUNCTURE: CPT

## 2020-03-02 PROCEDURE — 85027 COMPLETE CBC AUTOMATED: CPT

## 2020-03-02 PROCEDURE — 99223 1ST HOSP IP/OBS HIGH 75: CPT

## 2020-03-02 PROCEDURE — 93005 ELECTROCARDIOGRAM TRACING: CPT

## 2020-03-02 RX ORDER — LEVOTHYROXINE SODIUM 125 MCG
75 TABLET ORAL DAILY
Refills: 0 | Status: DISCONTINUED | OUTPATIENT
Start: 2020-03-02 | End: 2020-03-05

## 2020-03-02 RX ORDER — OXYBUTYNIN CHLORIDE 5 MG
5 TABLET ORAL AT BEDTIME
Refills: 0 | Status: DISCONTINUED | OUTPATIENT
Start: 2020-03-02 | End: 2020-03-05

## 2020-03-02 RX ORDER — ASPIRIN/CALCIUM CARB/MAGNESIUM 324 MG
1 TABLET ORAL
Qty: 0 | Refills: 0 | DISCHARGE

## 2020-03-02 RX ORDER — LACOSAMIDE 50 MG/1
50 TABLET ORAL
Refills: 0 | Status: DISCONTINUED | OUTPATIENT
Start: 2020-03-02 | End: 2020-03-03

## 2020-03-02 RX ORDER — INSULIN LISPRO 100/ML
VIAL (ML) SUBCUTANEOUS
Refills: 0 | Status: DISCONTINUED | OUTPATIENT
Start: 2020-03-02 | End: 2020-03-05

## 2020-03-02 RX ORDER — METOPROLOL TARTRATE 50 MG
50 TABLET ORAL
Refills: 0 | Status: DISCONTINUED | OUTPATIENT
Start: 2020-03-02 | End: 2020-03-05

## 2020-03-02 RX ORDER — ONDANSETRON 8 MG/1
4 TABLET, FILM COATED ORAL EVERY 6 HOURS
Refills: 0 | Status: DISCONTINUED | OUTPATIENT
Start: 2020-03-02 | End: 2020-03-05

## 2020-03-02 RX ORDER — KETOCONAZOLE 20 MG/G
1 AEROSOL, FOAM TOPICAL
Qty: 0 | Refills: 0 | DISCHARGE

## 2020-03-02 RX ORDER — INSULIN GLARGINE 100 [IU]/ML
30 INJECTION, SOLUTION SUBCUTANEOUS AT BEDTIME
Refills: 0 | Status: DISCONTINUED | OUTPATIENT
Start: 2020-03-02 | End: 2020-03-03

## 2020-03-02 RX ORDER — ROSUVASTATIN CALCIUM 5 MG/1
1 TABLET ORAL
Qty: 0 | Refills: 0 | DISCHARGE

## 2020-03-02 RX ORDER — DEXTROSE 50 % IN WATER 50 %
25 SYRINGE (ML) INTRAVENOUS ONCE
Refills: 0 | Status: DISCONTINUED | OUTPATIENT
Start: 2020-03-02 | End: 2020-03-05

## 2020-03-02 RX ORDER — INSULIN LISPRO 100/ML
0 VIAL (ML) SUBCUTANEOUS
Qty: 0 | Refills: 0 | DISCHARGE

## 2020-03-02 RX ORDER — DEXTROSE 50 % IN WATER 50 %
15 SYRINGE (ML) INTRAVENOUS ONCE
Refills: 0 | Status: DISCONTINUED | OUTPATIENT
Start: 2020-03-02 | End: 2020-03-05

## 2020-03-02 RX ORDER — ATORVASTATIN CALCIUM 80 MG/1
40 TABLET, FILM COATED ORAL AT BEDTIME
Refills: 0 | Status: DISCONTINUED | OUTPATIENT
Start: 2020-03-02 | End: 2020-03-05

## 2020-03-02 RX ORDER — LACOSAMIDE 50 MG/1
75 TABLET ORAL
Refills: 0 | Status: DISCONTINUED | OUTPATIENT
Start: 2020-03-02 | End: 2020-03-04

## 2020-03-02 RX ORDER — GLUCAGON INJECTION, SOLUTION 0.5 MG/.1ML
1 INJECTION, SOLUTION SUBCUTANEOUS ONCE
Refills: 0 | Status: DISCONTINUED | OUTPATIENT
Start: 2020-03-02 | End: 2020-03-05

## 2020-03-02 RX ORDER — ACETAMINOPHEN 500 MG
650 TABLET ORAL EVERY 4 HOURS
Refills: 0 | Status: DISCONTINUED | OUTPATIENT
Start: 2020-03-02 | End: 2020-03-05

## 2020-03-02 RX ORDER — LEVETIRACETAM 250 MG/1
1000 TABLET, FILM COATED ORAL
Refills: 0 | Status: DISCONTINUED | OUTPATIENT
Start: 2020-03-02 | End: 2020-03-05

## 2020-03-02 RX ORDER — SODIUM CHLORIDE 9 MG/ML
1000 INJECTION, SOLUTION INTRAVENOUS
Refills: 0 | Status: DISCONTINUED | OUTPATIENT
Start: 2020-03-02 | End: 2020-03-05

## 2020-03-02 RX ORDER — LEVETIRACETAM 250 MG/1
2 TABLET, FILM COATED ORAL
Qty: 0 | Refills: 0 | DISCHARGE

## 2020-03-02 RX ORDER — LACOSAMIDE 50 MG/1
150 TABLET ORAL
Refills: 0 | Status: DISCONTINUED | OUTPATIENT
Start: 2020-03-02 | End: 2020-03-02

## 2020-03-02 RX ORDER — PANTOPRAZOLE SODIUM 20 MG/1
40 TABLET, DELAYED RELEASE ORAL
Refills: 0 | Status: DISCONTINUED | OUTPATIENT
Start: 2020-03-02 | End: 2020-03-05

## 2020-03-02 RX ORDER — LACOSAMIDE 50 MG/1
100 TABLET ORAL
Refills: 0 | Status: DISCONTINUED | OUTPATIENT
Start: 2020-03-02 | End: 2020-03-02

## 2020-03-02 RX ORDER — INSULIN LISPRO 100/ML
VIAL (ML) SUBCUTANEOUS AT BEDTIME
Refills: 0 | Status: DISCONTINUED | OUTPATIENT
Start: 2020-03-02 | End: 2020-03-05

## 2020-03-02 RX ORDER — ENOXAPARIN SODIUM 100 MG/ML
30 INJECTION SUBCUTANEOUS
Qty: 0 | Refills: 0 | DISCHARGE

## 2020-03-02 RX ORDER — DEXTROSE 50 % IN WATER 50 %
12.5 SYRINGE (ML) INTRAVENOUS ONCE
Refills: 0 | Status: DISCONTINUED | OUTPATIENT
Start: 2020-03-02 | End: 2020-03-05

## 2020-03-02 RX ORDER — FUROSEMIDE 40 MG
1 TABLET ORAL
Qty: 0 | Refills: 0 | DISCHARGE

## 2020-03-02 RX ORDER — HEPARIN SODIUM 5000 [USP'U]/ML
5000 INJECTION INTRAVENOUS; SUBCUTANEOUS EVERY 8 HOURS
Refills: 0 | Status: DISCONTINUED | OUTPATIENT
Start: 2020-03-02 | End: 2020-03-05

## 2020-03-02 RX ORDER — SODIUM CHLORIDE 9 MG/ML
1000 INJECTION INTRAMUSCULAR; INTRAVENOUS; SUBCUTANEOUS
Refills: 0 | Status: DISCONTINUED | OUTPATIENT
Start: 2020-03-02 | End: 2020-03-05

## 2020-03-02 RX ORDER — ASPIRIN/CALCIUM CARB/MAGNESIUM 324 MG
81 TABLET ORAL DAILY
Refills: 0 | Status: DISCONTINUED | OUTPATIENT
Start: 2020-03-02 | End: 2020-03-05

## 2020-03-02 RX ORDER — AMLODIPINE BESYLATE 2.5 MG/1
5 TABLET ORAL DAILY
Refills: 0 | Status: DISCONTINUED | OUTPATIENT
Start: 2020-03-02 | End: 2020-03-03

## 2020-03-02 RX ORDER — OXCARBAZEPINE 300 MG/1
450 TABLET, FILM COATED ORAL
Refills: 0 | Status: DISCONTINUED | OUTPATIENT
Start: 2020-03-02 | End: 2020-03-03

## 2020-03-02 RX ADMIN — LEVETIRACETAM 1000 MILLIGRAM(S): 250 TABLET, FILM COATED ORAL at 17:54

## 2020-03-02 RX ADMIN — LACOSAMIDE 75 MILLIGRAM(S): 50 TABLET ORAL at 21:25

## 2020-03-02 RX ADMIN — Medication 1: at 18:28

## 2020-03-02 RX ADMIN — INSULIN GLARGINE 30 UNIT(S): 100 INJECTION, SOLUTION SUBCUTANEOUS at 21:25

## 2020-03-02 RX ADMIN — AMLODIPINE BESYLATE 5 MILLIGRAM(S): 2.5 TABLET ORAL at 17:53

## 2020-03-02 RX ADMIN — Medication 81 MILLIGRAM(S): at 17:53

## 2020-03-02 RX ADMIN — OXCARBAZEPINE 450 MILLIGRAM(S): 300 TABLET, FILM COATED ORAL at 17:54

## 2020-03-02 RX ADMIN — Medication 50 MILLIGRAM(S): at 17:54

## 2020-03-02 RX ADMIN — ATORVASTATIN CALCIUM 40 MILLIGRAM(S): 80 TABLET, FILM COATED ORAL at 21:37

## 2020-03-02 RX ADMIN — HEPARIN SODIUM 5000 UNIT(S): 5000 INJECTION INTRAVENOUS; SUBCUTANEOUS at 21:37

## 2020-03-02 RX ADMIN — Medication 5 MILLIGRAM(S): at 21:24

## 2020-03-02 RX ADMIN — PANTOPRAZOLE SODIUM 40 MILLIGRAM(S): 20 TABLET, DELAYED RELEASE ORAL at 17:54

## 2020-03-02 NOTE — PATIENT PROFILE ADULT - VISION (WITH CORRECTIVE LENSES IF THE PATIENT USUALLY WEARS THEM):
Partially impaired: cannot see medication labels or newsprint, but can see obstacles in path, and the surrounding layout; can count fingers at arm's length/patient has two sets of glasses with him

## 2020-03-02 NOTE — H&P ADULT - NSHPREVIEWOFSYSTEMS_GEN_ALL_CORE
REVIEW OF SYSTEMS:    CONSTITUTIONAL: + weakness, no fevers or chills  EYES/ENT: + blurry vision   NECK: No pain or stiffness  RESPIRATORY: No cough, wheezing, hemoptysis; No shortness of breath  CARDIOVASCULAR: No chest pain or palpitations  GASTROINTESTINAL: No abdominal or epigastric pain. No nausea, vomiting, or hematemesis; No diarrhea or constipation. No melena or hematochezia.  GENITOURINARY: incontinent  NEUROLOGICAL: numbness on LE- due to neuropathy  SKIN: No itching, burning, rashes, or lesions   All other review of systems is negative unless indicated above.

## 2020-03-02 NOTE — H&P ADULT - ASSESSMENT
74 year old man with a history of focal epilepsy with impaired awareness.  He now presents for video EEG monitoring to determine if there are ongoing seizure given new memory disturbance and possible parasomnias vs nocturnal seizures.    *Seizure disorder  - Neurology consult  - EMU admission  - EEG as per Neuro  - AED as per Neuro  - Vimpat 50 mg in the AM and 100 mg at night  - Keppra 1000 mg BID  - Oxcarb 450 mg BID.  - monitor  - seizure precautions    *DM- ISS  - monitor  -as per humberto A1c 5.9 checked 2 weeks ago with PCP Dr UMANZOR  - Diabetes diet    *HTN- monitor  - resume BP meds    *history of Stent- on aspirin    *hypothyroid- on synthroid    *HLD- on statin  check Lipid panel        Case d/w team and MD on IDR. Plan explained to patient and all of their concerns were addressed. 74 year old man with a history of focal epilepsy with impaired awareness.  He now presents for video EEG monitoring to determine if there are ongoing seizure given new memory disturbance and possible parasomnias vs nocturnal seizures.    *Seizure disorder  - Neurology consult appreciated  - EMU admission  - video EEG as per Neuro x 48-72 hrs  - AED as per Neuro  - Vimpat 50 mg in the AM and 100 mg at night  - Keppra 1000 mg BID  - Oxcarb 450 mg BID.  - monitor  - seizure precautions    *DM- ISS  - monitor  -as per humberto A1c 5.9 checked 2 weeks ago with PCP Dr UMANZOR  - Diabetes diet    *HTN- monitor  - resume BP meds    *history of Stent- on aspirin    *hypothyroid- on synthroid    *HLD- on statin  check Lipid panel    CKD 3: low dose iv fluids  recheck BMP in am    DVT PPX: heparin s/q    Case d/w team and MD on IDR. Plan explained to patient and all of their concerns were addressed.

## 2020-03-02 NOTE — PATIENT PROFILE ADULT - FALLEN IN THE PAST
no/patient states he notices that he is not able to walk as well and needs to stop often to catch his breath

## 2020-03-02 NOTE — CONSULT NOTE ADULT - ASSESSMENT
"Subjective:  The history is provided by the patient.     Oswestry Score: 24 %                 Objective:  System    Physical Exam    General     ROS    Assessment/Plan:    DISCHARGE REPORT    Progress reporting period is from 2/05/19 to 3/11/19.       SUBJECTIVE  Patient reports improvement in his LBP since the start of therapy.  States his low back has been feeling \"good.\"  No longer getting pain into posterior thighs.  Notes less discomfort when bending to perform ADLs.   He admits to being \"lazy\" and not doing exercises.    Current pain level is 0/10  .     Previous pain level was 4/10  .   Changes in function:  Yes (See Goal flowsheet attached for changes in current functional level)  Adverse reaction to treatment or activity: None    OBJECTIVE  Lumbar AROM: Flexion WNL, Ext WNL (slight LBP) , SG WFL and symmetrical.    Core Strength: very poor.  Patient has difficulty engaging TrAbdominis without global compensation.  Performance on this improved with repetition and significant cueing.     Lumbar Myotomes:    T12-L3 (Hip Flex):   Left: 3+     Right: 3  L2-4 (Quads):          Left:  5      Right:  5  L4 (Ankle DF):         Left:  5      Right:  3+  L5 (Great Toe Ext):  Left: 5      NA due to amputation    Posture: poor.  Demonstrates decreased proprioceptive awareness of neutral spine mechanics with bending / lifting in the clinic, requiring significant cueing.  Exhibits functional quad weakness with partial squat.    ASSESSMENT/PLAN  STG/LTGs have been met or progress has been made towards goals:  Yes (See Goal flow sheet completed today.)  Assessment of Progress: The patient has met all of his long term goals.  Self Management Plans:  Patient has been instructed in a home exercise program. He admits, however, to not doing exercises because he sees \"no point\" in doing them.  Patient further states that his daughter has been giving him massages and this feels better to him than doing exercises.  Educated patient " regarding evidence based treatment of LBP.  [place] and this was treated with massage, injections, and cold compresses.        Recommendations:  Given patient's unwillingness to follow-through on exercise instruction, I do not believe any additional physical therapy is warranted at this time. Encouraged patient to focus on home exercises he has already been given.         Please refer to the daily flowsheet for treatment today, total treatment time and time spent performing 1:1 timed codes.           Mr. Amato is a 74 year old man with a history of focal epilepsy with impaired awareness.  He now presents for video EEG monitoring to determine if there are ongoing seizure given new memory disturbance and possible parasomnias vs nocturnal seizures.    -Video EEG  -Continue current AEDs including:  Vimpat 50 mg in the AM and 100 mg at night  Keppra 1000 mg BID  Oxcarb 450 mg BID.    Which check cbc, cmp    Continue other meds.    Patient reports right peripheral vision loss, but he seems to have more of a right field cut.  Imaging (in outpatient chart) shows left occipital-parietal angioma which may explain this finding.    Will follow.

## 2020-03-02 NOTE — H&P ADULT - NSHPLABSRESULTS_GEN_ALL_CORE
Lab Results:  CBC  CBC Full  -  ( 02 Mar 2020 12:57 )  WBC Count : 7.50 K/uL  RBC Count : 3.95 M/uL  Hemoglobin : 12.0 g/dL  Hematocrit : 34.5 %  Platelet Count - Automated : 199 K/uL  Mean Cell Volume : 87.3 fl  Mean Cell Hemoglobin : 30.4 pg  Mean Cell Hemoglobin Concentration : 34.8 gm/dL  Auto Neutrophil # : x  Auto Lymphocyte # : x  Auto Monocyte # : x  Auto Eosinophil # : x  Auto Basophil # : x  Auto Neutrophil % : x  Auto Lymphocyte % : x  Auto Monocyte % : x  Auto Eosinophil % : x  Auto Basophil % : x    .		Differential:	[] Automated		[] Manual  Chemistry                        12.0   7.50  )-----------( 199      ( 02 Mar 2020 12:57 )             34.5     03-02    141  |  109<H>  |  33<H>  ----------------------------<  133<H>  4.8   |  28  |  1.63<H>    Ca    8.4<L>      02 Mar 2020 12:57    TPro  6.3  /  Alb  3.2<L>  /  TBili  0.3  /  DBili  x   /  AST  18  /  ALT  17  /  AlkPhos  81  03-02    LIVER FUNCTIONS - ( 02 Mar 2020 12:57 )  Alb: 3.2 g/dL / Pro: 6.3 gm/dL / ALK PHOS: 81 U/L / ALT: 17 U/L / AST: 18 U/L / GGT: x                     MICROBIOLOGY/CULTURES:      RADIOLOGY RESULTS:

## 2020-03-02 NOTE — PATIENT PROFILE ADULT - FALL HARM RISK
Patient admits that he has had increased confusion, difficulty time finding words, has b/l le edema and chronic numbness to lower extremities and intermittent numbness to b/l upper extremities/other
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
## 2020-03-02 NOTE — H&P ADULT - NSHPPHYSICALEXAM_GEN_ALL_CORE
Vital Signs Last 24 Hrs  T(C): 36.7 (02 Mar 2020 10:40), Max: 36.7 (02 Mar 2020 10:40)  T(F): 98 (02 Mar 2020 10:40), Max: 98 (02 Mar 2020 10:40)  HR: 59 (02 Mar 2020 10:40) (59 - 59)  BP: 152/69 (02 Mar 2020 10:40) (152/69 - 152/69)  BP(mean): --  RR: 16 (02 Mar 2020 10:40) (16 - 16)  SpO2: --    PE:  Constitutional: NAD, laying in bed  HEENT: NC/AT  Back: no tenderness  Respiratory: respirations even and non labored, LCTA  Cardiovascular: S1S2 regular, no murmurs  Abdomen: soft, not tender, not distended, positive BS  Genitourinary: voiding  Rectal: deferred  Musculoskeletal: no muscle tenderness, no joint swelling or tenderness  Extremities: no pedal edema   Neurological: no focal deficits

## 2020-03-02 NOTE — PATIENT PROFILE ADULT - FUNCTIONAL SCREEN CURRENT LEVEL: COMMUNICATION, MLM
2 = difficulty speaking (not related to language barrier)/patient verbalizes that he has noticed that at times he has become increasingly more forgetful as of late and has difficulty finding his words

## 2020-03-02 NOTE — H&P ADULT - ATTENDING COMMENTS
Patient seen and examined with the NP. Agree with above plan of care which was discussed with the patient, family, team and NP.
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
## 2020-03-03 DIAGNOSIS — Z71.89 OTHER SPECIFIED COUNSELING: ICD-10-CM

## 2020-03-03 LAB
ANION GAP SERPL CALC-SCNC: 4 MMOL/L — LOW (ref 5–17)
BUN SERPL-MCNC: 28 MG/DL — HIGH (ref 7–23)
CALCIUM SERPL-MCNC: 8.4 MG/DL — LOW (ref 8.5–10.1)
CHLORIDE SERPL-SCNC: 108 MMOL/L — SIGNIFICANT CHANGE UP (ref 96–108)
CO2 SERPL-SCNC: 26 MMOL/L — SIGNIFICANT CHANGE UP (ref 22–31)
CREAT SERPL-MCNC: 1.54 MG/DL — HIGH (ref 0.5–1.3)
GLUCOSE SERPL-MCNC: 114 MG/DL — HIGH (ref 70–99)
HBA1C BLD-MCNC: 5.6 % — SIGNIFICANT CHANGE UP (ref 4–5.6)
POTASSIUM SERPL-MCNC: 4.1 MMOL/L — SIGNIFICANT CHANGE UP (ref 3.5–5.3)
POTASSIUM SERPL-SCNC: 4.1 MMOL/L — SIGNIFICANT CHANGE UP (ref 3.5–5.3)
SODIUM SERPL-SCNC: 138 MMOL/L — SIGNIFICANT CHANGE UP (ref 135–145)

## 2020-03-03 PROCEDURE — 99231 SBSQ HOSP IP/OBS SF/LOW 25: CPT

## 2020-03-03 PROCEDURE — 99232 SBSQ HOSP IP/OBS MODERATE 35: CPT

## 2020-03-03 PROCEDURE — 93010 ELECTROCARDIOGRAM REPORT: CPT

## 2020-03-03 PROCEDURE — 95720 EEG PHY/QHP EA INCR W/VEEG: CPT

## 2020-03-03 RX ORDER — OXCARBAZEPINE 300 MG/1
450 TABLET, FILM COATED ORAL
Refills: 0 | Status: DISCONTINUED | OUTPATIENT
Start: 2020-03-03 | End: 2020-03-03

## 2020-03-03 RX ORDER — INSULIN GLARGINE 100 [IU]/ML
25 INJECTION, SOLUTION SUBCUTANEOUS AT BEDTIME
Refills: 0 | Status: DISCONTINUED | OUTPATIENT
Start: 2020-03-03 | End: 2020-03-05

## 2020-03-03 RX ORDER — LACOSAMIDE 50 MG/1
100 TABLET ORAL
Refills: 0 | Status: DISCONTINUED | OUTPATIENT
Start: 2020-03-04 | End: 2020-03-04

## 2020-03-03 RX ORDER — AMLODIPINE BESYLATE 2.5 MG/1
10 TABLET ORAL DAILY
Refills: 0 | Status: DISCONTINUED | OUTPATIENT
Start: 2020-03-03 | End: 2020-03-05

## 2020-03-03 RX ORDER — OXCARBAZEPINE 300 MG/1
450 TABLET, FILM COATED ORAL
Refills: 0 | Status: DISCONTINUED | OUTPATIENT
Start: 2020-03-03 | End: 2020-03-04

## 2020-03-03 RX ORDER — OXCARBAZEPINE 300 MG/1
300 TABLET, FILM COATED ORAL
Refills: 0 | Status: DISCONTINUED | OUTPATIENT
Start: 2020-03-03 | End: 2020-03-04

## 2020-03-03 RX ORDER — AMLODIPINE BESYLATE 2.5 MG/1
5 TABLET ORAL ONCE
Refills: 0 | Status: COMPLETED | OUTPATIENT
Start: 2020-03-03 | End: 2020-03-03

## 2020-03-03 RX ADMIN — Medication 75 MICROGRAM(S): at 05:28

## 2020-03-03 RX ADMIN — Medication 5 MILLIGRAM(S): at 21:38

## 2020-03-03 RX ADMIN — OXCARBAZEPINE 450 MILLIGRAM(S): 300 TABLET, FILM COATED ORAL at 21:37

## 2020-03-03 RX ADMIN — Medication 1: at 17:29

## 2020-03-03 RX ADMIN — HEPARIN SODIUM 5000 UNIT(S): 5000 INJECTION INTRAVENOUS; SUBCUTANEOUS at 21:37

## 2020-03-03 RX ADMIN — PANTOPRAZOLE SODIUM 40 MILLIGRAM(S): 20 TABLET, DELAYED RELEASE ORAL at 05:31

## 2020-03-03 RX ADMIN — ATORVASTATIN CALCIUM 40 MILLIGRAM(S): 80 TABLET, FILM COATED ORAL at 21:36

## 2020-03-03 RX ADMIN — HEPARIN SODIUM 5000 UNIT(S): 5000 INJECTION INTRAVENOUS; SUBCUTANEOUS at 13:23

## 2020-03-03 RX ADMIN — OXCARBAZEPINE 300 MILLIGRAM(S): 300 TABLET, FILM COATED ORAL at 18:23

## 2020-03-03 RX ADMIN — Medication 50 MILLIGRAM(S): at 18:23

## 2020-03-03 RX ADMIN — LACOSAMIDE 50 MILLIGRAM(S): 50 TABLET ORAL at 07:48

## 2020-03-03 RX ADMIN — OXCARBAZEPINE 450 MILLIGRAM(S): 300 TABLET, FILM COATED ORAL at 05:27

## 2020-03-03 RX ADMIN — LEVETIRACETAM 1000 MILLIGRAM(S): 250 TABLET, FILM COATED ORAL at 05:27

## 2020-03-03 RX ADMIN — LEVETIRACETAM 1000 MILLIGRAM(S): 250 TABLET, FILM COATED ORAL at 18:23

## 2020-03-03 RX ADMIN — AMLODIPINE BESYLATE 5 MILLIGRAM(S): 2.5 TABLET ORAL at 05:27

## 2020-03-03 RX ADMIN — Medication 81 MILLIGRAM(S): at 13:23

## 2020-03-03 RX ADMIN — LACOSAMIDE 75 MILLIGRAM(S): 50 TABLET ORAL at 21:39

## 2020-03-03 RX ADMIN — Medication 50 MILLIGRAM(S): at 05:28

## 2020-03-03 RX ADMIN — HEPARIN SODIUM 5000 UNIT(S): 5000 INJECTION INTRAVENOUS; SUBCUTANEOUS at 05:31

## 2020-03-03 RX ADMIN — INSULIN GLARGINE 25 UNIT(S): 100 INJECTION, SOLUTION SUBCUTANEOUS at 21:36

## 2020-03-03 RX ADMIN — AMLODIPINE BESYLATE 5 MILLIGRAM(S): 2.5 TABLET ORAL at 13:23

## 2020-03-03 RX ADMIN — SODIUM CHLORIDE 75 MILLILITER(S): 9 INJECTION INTRAMUSCULAR; INTRAVENOUS; SUBCUTANEOUS at 01:02

## 2020-03-03 NOTE — PROGRESS NOTE ADULT - SUBJECTIVE AND OBJECTIVE BOX
74 year old man with a history of  DM, HTN, Hypothyroid, Stents in 2019- diabetic neuropathy, CKD 3, childhood epilepsy until age 11 and recurrence of seizures at age 68.  Recently he has had possible breakthrough complex partial seizures.  He reports to increasing word finding difficulty and increasing memory loss.  He reports that he was having more frequent seizures until about one year ago when Vimpat was added.    3/3    Vital Signs Last 24 Hrs  T(C): 36.8 (03 Mar 2020 05:28), Max: 36.8 (03 Mar 2020 05:28)  T(F): 98.3 (03 Mar 2020 05:28), Max: 98.3 (03 Mar 2020 05:28)  HR: 59 (03 Mar 2020 06:59) (58 - 59)  BP: 169/78 (03 Mar 2020 05:28) (152/69 - 169/78)  BP(mean): --  RR: 16 (03 Mar 2020 05:28) (16 - 16)  SpO2: 96% (03 Mar 2020 05:28) (96% - 97%)    REVIEW OF SYSTEM  CONSTITUTIONAL: + weakness, no fevers or chills  EYES/ENT: + blurry vision   NECK: No pain or stiffness  RESPIRATORY: No cough, wheezing, hemoptysis; No shortness of breath  CARDIOVASCULAR: No chest pain or palpitations  GASTROINTESTINAL: No abdominal or epigastric pain. No nausea, vomiting, or hematemesis; No diarrhea or constipation. No melena or hematochezia.  GENITOURINARY: incontinent  NEUROLOGICAL: numbness on LE- due to neuropathy  SKIN: No itching, burning, rashes, or lesions   All other review of systems is negative unless indicated above.    PE:  Constitutional: NAD, laying in bed  HEENT: NC/AT  Back: no tenderness  Respiratory: respirations even and non labored, LCTA  Cardiovascular: S1S2 regular, no murmurs  Abdomen: soft, not tender, not distended, positive BS  Genitourinary: voiding  Musculoskeletal: no muscle tenderness, no joint swelling or tenderness  Extremities: no pedal edema   Neurological: no focal deficits    03-03    138  |  108  |  28<H>  ----------------------------<  114<H>  4.1   |  26  |  1.54<H>    Ca    8.4<L>      03 Mar 2020 08:08    TPro  6.3  /  Alb  3.2<L>  /  TBili  0.3  /  DBili  x   /  AST  18  /  ALT  17  /  AlkPhos  81  03-02                        12.0   7.50  )-----------( 199      ( 02 Mar 2020 12:57 )             34.5       MEDICATIONS  (STANDING):  amLODIPine   Tablet 5 milliGRAM(s) Oral daily  aspirin  chewable 81 milliGRAM(s) Oral daily  atorvastatin 40 milliGRAM(s) Oral at bedtime  dextrose 5%. 1000 milliLiter(s) (50 mL/Hr) IV Continuous <Continuous>  dextrose 50% Injectable 12.5 Gram(s) IV Push once  dextrose 50% Injectable 25 Gram(s) IV Push once  dextrose 50% Injectable 25 Gram(s) IV Push once  heparin  Injectable 5000 Unit(s) SubCutaneous every 8 hours  insulin glargine Injectable (LANTUS) 30 Unit(s) SubCutaneous at bedtime  insulin lispro (HumaLOG) corrective regimen sliding scale   SubCutaneous three times a day before meals  insulin lispro (HumaLOG) corrective regimen sliding scale   SubCutaneous at bedtime  lacosamide 50 milliGRAM(s) Oral <User Schedule>  lacosamide 75 milliGRAM(s) Oral <User Schedule>  levETIRAcetam 1000 milliGRAM(s) Oral two times a day  levothyroxine 75 MICROGram(s) Oral daily  metoprolol tartrate 50 milliGRAM(s) Oral two times a day  OXcarbazepine 450 milliGRAM(s) Oral two times a day  oxybutynin 5 milliGRAM(s) Oral at bedtime  pantoprazole    Tablet 40 milliGRAM(s) Oral before breakfast  sodium chloride 0.9%. 1000 milliLiter(s) (75 mL/Hr) IV Continuous <Continuous>    MEDICATIONS  (PRN):  acetaminophen   Tablet .. 650 milliGRAM(s) Oral every 4 hours PRN Mild Pain (1 - 3)  dextrose 40% Gel 15 Gram(s) Oral once PRN Blood Glucose LESS THAN 70 milliGRAM(s)/deciliter  glucagon  Injectable 1 milliGRAM(s) IntraMuscular once PRN Glucose LESS THAN 70 milligrams/deciliter  ondansetron Injectable 4 milliGRAM(s) IV Push every 6 hours PRN Nausea 74 year old man with a history of  DM, HTN, Hypothyroid, Stents in 2019- diabetic neuropathy, CKD 3, childhood epilepsy until age 11 and recurrence of seizures at age 68.  Recently he has had possible breakthrough complex partial seizures.  He reports to increasing word finding difficulty and increasing memory loss.  He reports that he was having more frequent seizures until about one year ago when Vimpat was added.    3/3- patient was seen and examined. No acute overnight events. no seizure noted overnight.     Vital Signs Last 24 Hrs  T(C): 36.8 (03 Mar 2020 05:28), Max: 36.8 (03 Mar 2020 05:28)  T(F): 98.3 (03 Mar 2020 05:28), Max: 98.3 (03 Mar 2020 05:28)  HR: 59 (03 Mar 2020 06:59) (58 - 59)  BP: 169/78 (03 Mar 2020 05:28) (152/69 - 169/78)  BP(mean): --  RR: 16 (03 Mar 2020 05:28) (16 - 16)  SpO2: 96% (03 Mar 2020 05:28) (96% - 97%)    REVIEW OF SYSTEM  CONSTITUTIONAL: + weakness, no fevers or chills  EYES/ENT: + blurry vision   NECK: No pain or stiffness  RESPIRATORY: No cough, wheezing, hemoptysis; No shortness of breath  CARDIOVASCULAR: No chest pain or palpitations  GASTROINTESTINAL: No abdominal or epigastric pain. No nausea, vomiting, or hematemesis; No diarrhea or constipation. No melena or hematochezia.  GENITOURINARY: incontinent  NEUROLOGICAL: numbness on LE- due to neuropathy  SKIN: No itching, burning, rashes, or lesions   All other review of systems is negative unless indicated above.    PE:  Constitutional: NAD, laying in bed  HEENT: NC/AT  Back: no tenderness  Respiratory: respirations even and non labored, LCTA  Cardiovascular: S1S2 regular, no murmurs  Abdomen: soft, not tender, not distended, positive BS  Genitourinary: voiding  Musculoskeletal: no muscle tenderness, no joint swelling or tenderness  Extremities: no pedal edema   Neurological: no focal deficits    03-03    138  |  108  |  28<H>  ----------------------------<  114<H>  4.1   |  26  |  1.54<H>    Ca    8.4<L>      03 Mar 2020 08:08    TPro  6.3  /  Alb  3.2<L>  /  TBili  0.3  /  DBili  x   /  AST  18  /  ALT  17  /  AlkPhos  81  03-02                        12.0   7.50  )-----------( 199      ( 02 Mar 2020 12:57 )             34.5       MEDICATIONS  (STANDING):  amLODIPine   Tablet 5 milliGRAM(s) Oral daily  aspirin  chewable 81 milliGRAM(s) Oral daily  atorvastatin 40 milliGRAM(s) Oral at bedtime  dextrose 5%. 1000 milliLiter(s) (50 mL/Hr) IV Continuous <Continuous>  dextrose 50% Injectable 12.5 Gram(s) IV Push once  dextrose 50% Injectable 25 Gram(s) IV Push once  dextrose 50% Injectable 25 Gram(s) IV Push once  heparin  Injectable 5000 Unit(s) SubCutaneous every 8 hours  insulin glargine Injectable (LANTUS) 30 Unit(s) SubCutaneous at bedtime  insulin lispro (HumaLOG) corrective regimen sliding scale   SubCutaneous three times a day before meals  insulin lispro (HumaLOG) corrective regimen sliding scale   SubCutaneous at bedtime  lacosamide 50 milliGRAM(s) Oral <User Schedule>  lacosamide 75 milliGRAM(s) Oral <User Schedule>  levETIRAcetam 1000 milliGRAM(s) Oral two times a day  levothyroxine 75 MICROGram(s) Oral daily  metoprolol tartrate 50 milliGRAM(s) Oral two times a day  OXcarbazepine 450 milliGRAM(s) Oral two times a day  oxybutynin 5 milliGRAM(s) Oral at bedtime  pantoprazole    Tablet 40 milliGRAM(s) Oral before breakfast  sodium chloride 0.9%. 1000 milliLiter(s) (75 mL/Hr) IV Continuous <Continuous>    MEDICATIONS  (PRN):  acetaminophen   Tablet .. 650 milliGRAM(s) Oral every 4 hours PRN Mild Pain (1 - 3)  dextrose 40% Gel 15 Gram(s) Oral once PRN Blood Glucose LESS THAN 70 milliGRAM(s)/deciliter  glucagon  Injectable 1 milliGRAM(s) IntraMuscular once PRN Glucose LESS THAN 70 milligrams/deciliter  ondansetron Injectable 4 milliGRAM(s) IV Push every 6 hours PRN Nausea 74 year old man with a history of  DM, HTN, Hypothyroid, Stents in 2019- diabetic neuropathy, CKD 3, childhood epilepsy until age 11 and recurrence of seizures at age 68.  Recently he has had possible breakthrough complex partial seizures.  He reports to increasing word finding difficulty and increasing memory loss.  He reports that he was having more frequent seizures until about one year ago when Vimpat was added.    3/3- patient was seen and examined. No acute overnight events. no seizure noted overnight. Care discussed with Neurology. Currently undergoing EEG.     Vital Signs Last 24 Hrs  T(C): 36.8 (03 Mar 2020 05:28), Max: 36.8 (03 Mar 2020 05:28)  T(F): 98.3 (03 Mar 2020 05:28), Max: 98.3 (03 Mar 2020 05:28)  HR: 59 (03 Mar 2020 06:59) (58 - 59)  BP: 169/78 (03 Mar 2020 05:28) (152/69 - 169/78)  BP(mean): --  RR: 16 (03 Mar 2020 05:28) (16 - 16)  SpO2: 96% (03 Mar 2020 05:28) (96% - 97%)    REVIEW OF SYSTEM  CONSTITUTIONAL: + weakness, no fevers or chills  EYES/ENT: + blurry vision   NECK: No pain or stiffness  RESPIRATORY: No cough, wheezing, hemoptysis; No shortness of breath  CARDIOVASCULAR: No chest pain or palpitations  GASTROINTESTINAL: No abdominal or epigastric pain. No nausea, vomiting, or hematemesis; No diarrhea or constipation. No melena or hematochezia.  GENITOURINARY: incontinent  NEUROLOGICAL: numbness on LE- due to neuropathy  SKIN: No itching, burning, rashes, or lesions   All other review of systems is negative unless indicated above.    PE:  Constitutional: NAD, laying in bed  HEENT: NC/AT  Back: no tenderness  Respiratory: respirations even and non labored, LCTA  Cardiovascular: S1S2 regular, no murmurs  Abdomen: soft, not tender, not distended, positive BS  Genitourinary: voiding  Musculoskeletal: no muscle tenderness, no joint swelling or tenderness  Extremities: no pedal edema   Neurological: no focal deficits    03-03    138  |  108  |  28<H>  ----------------------------<  114<H>  4.1   |  26  |  1.54<H>    Ca    8.4<L>      03 Mar 2020 08:08    TPro  6.3  /  Alb  3.2<L>  /  TBili  0.3  /  DBili  x   /  AST  18  /  ALT  17  /  AlkPhos  81  03-02                        12.0   7.50  )-----------( 199      ( 02 Mar 2020 12:57 )             34.5       MEDICATIONS  (STANDING):  amLODIPine   Tablet 5 milliGRAM(s) Oral daily  aspirin  chewable 81 milliGRAM(s) Oral daily  atorvastatin 40 milliGRAM(s) Oral at bedtime  dextrose 5%. 1000 milliLiter(s) (50 mL/Hr) IV Continuous <Continuous>  dextrose 50% Injectable 12.5 Gram(s) IV Push once  dextrose 50% Injectable 25 Gram(s) IV Push once  dextrose 50% Injectable 25 Gram(s) IV Push once  heparin  Injectable 5000 Unit(s) SubCutaneous every 8 hours  insulin glargine Injectable (LANTUS) 30 Unit(s) SubCutaneous at bedtime  insulin lispro (HumaLOG) corrective regimen sliding scale   SubCutaneous three times a day before meals  insulin lispro (HumaLOG) corrective regimen sliding scale   SubCutaneous at bedtime  lacosamide 50 milliGRAM(s) Oral <User Schedule>  lacosamide 75 milliGRAM(s) Oral <User Schedule>  levETIRAcetam 1000 milliGRAM(s) Oral two times a day  levothyroxine 75 MICROGram(s) Oral daily  metoprolol tartrate 50 milliGRAM(s) Oral two times a day  OXcarbazepine 450 milliGRAM(s) Oral two times a day  oxybutynin 5 milliGRAM(s) Oral at bedtime  pantoprazole    Tablet 40 milliGRAM(s) Oral before breakfast  sodium chloride 0.9%. 1000 milliLiter(s) (75 mL/Hr) IV Continuous <Continuous>    MEDICATIONS  (PRN):  acetaminophen   Tablet .. 650 milliGRAM(s) Oral every 4 hours PRN Mild Pain (1 - 3)  dextrose 40% Gel 15 Gram(s) Oral once PRN Blood Glucose LESS THAN 70 milliGRAM(s)/deciliter  glucagon  Injectable 1 milliGRAM(s) IntraMuscular once PRN Glucose LESS THAN 70 milligrams/deciliter  ondansetron Injectable 4 milliGRAM(s) IV Push every 6 hours PRN Nausea

## 2020-03-03 NOTE — PROGRESS NOTE ADULT - ASSESSMENT
Mr. Amato is a 74 year old man with a history of focal epilepsy with impaired awareness.  He now presents for video EEG monitoring to determine if there are ongoing seizure given new memory disturbance and possible parasomnias vs nocturnal seizures.    So far EEG has shown mild generalized slowing but no epileptiform activities.  -Continue video EEG to potentially capture events of concern.  -Continue current AEDs including:  Vimpat 50 mg in the AM and 75 mg at night  Keppra 1000 mg BID  Oxcarb 450 mg BID.    No evidence of hyponatremia.     Medicine management of HTN.    Will follow.

## 2020-03-03 NOTE — PROGRESS NOTE ADULT - ASSESSMENT
74 year old man with a history of focal epilepsy with impaired awareness.  He now presents for video EEG monitoring to determine if there are ongoing seizure given new memory disturbance and possible parasomnias vs nocturnal seizures.    *Seizure disorder  - Neurology consult appreciated  - EMU admission  - video EEG as per Neuro x 48-72 hrs  - AED as per Neuro  - Vimpat 50 mg in the AM and 75 mg at night  - Keppra 1000 mg BID  - Oxcarb 450 mg BID.  - monitor  - seizure precautions    *DM- ISS  - monitor  -as per humberto A1c 5.9 checked 2 weeks ago with PCP Dr UMANZOR  - Diabetes diet    *HTN- monitor  - resume BP meds    *history of Stent- on aspirin    *hypothyroid- on synthroid    *HLD- on statin  check Lipid panel    *CKD 3: low dose iv fluids  recheck BMP in am    DVT PPX: heparin s/q    Case d/w team and MD on IDR. Plan explained to patient and all of their concerns were addressed. 74 year old man with a history of focal epilepsy with impaired awareness.  He now presents for video EEG monitoring to determine if there are ongoing seizure given new memory disturbance and possible parasomnias vs nocturnal seizures.    *Seizure disorder  - Neurology consult appreciated  - EMU admission  - video EEG as per Neuro x 48-72 hrs  - AED as per Neuro  - Vimpat 50 mg in the AM and 75 mg at night  - Keppra 1000 mg BID  - Oxcarb 450 mg BID.  - monitor  - seizure precautions    *DM- ISS  - monitor  -as per humberto A1c 5.9 checked 2 weeks ago with PCP Dr UMANZOR  - Diabetes diet    *HTN- monitor  - resume BP meds    *history of Stent- on aspirin    *hypothyroid- on synthroid    *HLD- on statin  check Lipid panel    *CKD 3- as per patient he has stage 1 kidney disease  low dose iv fluids  recheck BMP in am  better this morning    DVT PPX: heparin s/q    Case d/w team and MD on IDR. Plan explained to patient and all of their concerns were addressed.

## 2020-03-03 NOTE — PROGRESS NOTE ADULT - SUBJECTIVE AND OBJECTIVE BOX
Interval History:  3/3/20: Patient is concerned about his blood pressure.  Otherwise he does not report any unusual events.      MEDICATIONS  (STANDING):  amLODIPine   Tablet 5 milliGRAM(s) Oral daily  aspirin  chewable 81 milliGRAM(s) Oral daily  atorvastatin 40 milliGRAM(s) Oral at bedtime  dextrose 5%. 1000 milliLiter(s) (50 mL/Hr) IV Continuous <Continuous>  dextrose 50% Injectable 12.5 Gram(s) IV Push once  dextrose 50% Injectable 25 Gram(s) IV Push once  dextrose 50% Injectable 25 Gram(s) IV Push once  heparin  Injectable 5000 Unit(s) SubCutaneous every 8 hours  insulin glargine Injectable (LANTUS) 30 Unit(s) SubCutaneous at bedtime  insulin lispro (HumaLOG) corrective regimen sliding scale   SubCutaneous three times a day before meals  insulin lispro (HumaLOG) corrective regimen sliding scale   SubCutaneous at bedtime  lacosamide 50 milliGRAM(s) Oral <User Schedule>  lacosamide 75 milliGRAM(s) Oral <User Schedule>  levETIRAcetam 1000 milliGRAM(s) Oral two times a day  levothyroxine 75 MICROGram(s) Oral daily  metoprolol tartrate 50 milliGRAM(s) Oral two times a day  OXcarbazepine 450 milliGRAM(s) Oral two times a day  oxybutynin 5 milliGRAM(s) Oral at bedtime  pantoprazole    Tablet 40 milliGRAM(s) Oral before breakfast  sodium chloride 0.9%. 1000 milliLiter(s) (75 mL/Hr) IV Continuous <Continuous>    MEDICATIONS  (PRN):  acetaminophen   Tablet .. 650 milliGRAM(s) Oral every 4 hours PRN Mild Pain (1 - 3)  dextrose 40% Gel 15 Gram(s) Oral once PRN Blood Glucose LESS THAN 70 milliGRAM(s)/deciliter  glucagon  Injectable 1 milliGRAM(s) IntraMuscular once PRN Glucose LESS THAN 70 milligrams/deciliter  ondansetron Injectable 4 milliGRAM(s) IV Push every 6 hours PRN Nausea      Allergies    iodine (Unknown)    Intolerances        PHYSICAL EXAM:  Vital Signs Last 24 Hrs  T(F): 98.3 (03-03-20 @ 05:28)  HR: 59 (03-03-20 @ 06:59)  BP: 169/78 (03-03-20 @ 05:28)  RR: 16 (03-03-20 @ 05:28)    GENERAL: NAD, well-groomed, well-developed  HEAD:  Atraumatic, Normocephalic  Neuro:  Awake, alert, no aphasia  CN: PERRL, EOMI, no nystagmus, no facial weakness, tongue protrudes in the midline  motor: normal tone, no pronator drift, full strength in all four extremities  sensory: intact to light touch  coordination: finger to nose intact bilaterally  DTRs: symmetric, plantar responses flexor bilaterally    LABS:                        12.0   7.50  )-----------( 199      ( 02 Mar 2020 12:57 )             34.5     03-03    138  |  108  |  28<H>  ----------------------------<  114<H>  4.1   |  26  |  1.54<H>    Ca    8.4<L>      03 Mar 2020 08:08    TPro  6.3  /  Alb  3.2<L>  /  TBili  0.3  /  DBili  x   /  AST  18  /  ALT  17  /  AlkPhos  81  03-02          RADIOLOGY & ADDITIONAL STUDIES:  EEG 3/3/20: MIld generalized slowing

## 2020-03-04 LAB
ANION GAP SERPL CALC-SCNC: 5 MMOL/L — SIGNIFICANT CHANGE UP (ref 5–17)
BUN SERPL-MCNC: 26 MG/DL — HIGH (ref 7–23)
CALCIUM SERPL-MCNC: 8.4 MG/DL — LOW (ref 8.5–10.1)
CHLORIDE SERPL-SCNC: 110 MMOL/L — HIGH (ref 96–108)
CO2 SERPL-SCNC: 23 MMOL/L — SIGNIFICANT CHANGE UP (ref 22–31)
CREAT SERPL-MCNC: 1.41 MG/DL — HIGH (ref 0.5–1.3)
GLUCOSE SERPL-MCNC: 103 MG/DL — HIGH (ref 70–99)
POTASSIUM SERPL-MCNC: 4.3 MMOL/L — SIGNIFICANT CHANGE UP (ref 3.5–5.3)
POTASSIUM SERPL-SCNC: 4.3 MMOL/L — SIGNIFICANT CHANGE UP (ref 3.5–5.3)
SODIUM SERPL-SCNC: 138 MMOL/L — SIGNIFICANT CHANGE UP (ref 135–145)

## 2020-03-04 PROCEDURE — 95720 EEG PHY/QHP EA INCR W/VEEG: CPT

## 2020-03-04 PROCEDURE — 99231 SBSQ HOSP IP/OBS SF/LOW 25: CPT

## 2020-03-04 PROCEDURE — 99232 SBSQ HOSP IP/OBS MODERATE 35: CPT

## 2020-03-04 RX ORDER — LACOSAMIDE 50 MG/1
100 TABLET ORAL
Refills: 0 | Status: DISCONTINUED | OUTPATIENT
Start: 2020-03-04 | End: 2020-03-05

## 2020-03-04 RX ORDER — OXCARBAZEPINE 300 MG/1
300 TABLET, FILM COATED ORAL
Refills: 0 | Status: DISCONTINUED | OUTPATIENT
Start: 2020-03-04 | End: 2020-03-05

## 2020-03-04 RX ADMIN — LEVETIRACETAM 1000 MILLIGRAM(S): 250 TABLET, FILM COATED ORAL at 05:40

## 2020-03-04 RX ADMIN — Medication 1: at 12:38

## 2020-03-04 RX ADMIN — LACOSAMIDE 100 MILLIGRAM(S): 50 TABLET ORAL at 08:43

## 2020-03-04 RX ADMIN — OXCARBAZEPINE 300 MILLIGRAM(S): 300 TABLET, FILM COATED ORAL at 08:43

## 2020-03-04 RX ADMIN — LACOSAMIDE 100 MILLIGRAM(S): 50 TABLET ORAL at 17:14

## 2020-03-04 RX ADMIN — HEPARIN SODIUM 5000 UNIT(S): 5000 INJECTION INTRAVENOUS; SUBCUTANEOUS at 21:19

## 2020-03-04 RX ADMIN — PANTOPRAZOLE SODIUM 40 MILLIGRAM(S): 20 TABLET, DELAYED RELEASE ORAL at 05:41

## 2020-03-04 RX ADMIN — Medication 1: at 17:15

## 2020-03-04 RX ADMIN — OXCARBAZEPINE 300 MILLIGRAM(S): 300 TABLET, FILM COATED ORAL at 17:15

## 2020-03-04 RX ADMIN — Medication 5 MILLIGRAM(S): at 21:20

## 2020-03-04 RX ADMIN — Medication 75 MICROGRAM(S): at 05:40

## 2020-03-04 RX ADMIN — HEPARIN SODIUM 5000 UNIT(S): 5000 INJECTION INTRAVENOUS; SUBCUTANEOUS at 05:41

## 2020-03-04 RX ADMIN — Medication 81 MILLIGRAM(S): at 12:09

## 2020-03-04 RX ADMIN — HEPARIN SODIUM 5000 UNIT(S): 5000 INJECTION INTRAVENOUS; SUBCUTANEOUS at 12:10

## 2020-03-04 RX ADMIN — ATORVASTATIN CALCIUM 40 MILLIGRAM(S): 80 TABLET, FILM COATED ORAL at 21:20

## 2020-03-04 RX ADMIN — LEVETIRACETAM 1000 MILLIGRAM(S): 250 TABLET, FILM COATED ORAL at 17:15

## 2020-03-04 RX ADMIN — AMLODIPINE BESYLATE 10 MILLIGRAM(S): 2.5 TABLET ORAL at 05:40

## 2020-03-04 RX ADMIN — Medication 50 MILLIGRAM(S): at 05:41

## 2020-03-04 RX ADMIN — INSULIN GLARGINE 25 UNIT(S): 100 INJECTION, SOLUTION SUBCUTANEOUS at 21:19

## 2020-03-04 RX ADMIN — Medication 50 MILLIGRAM(S): at 17:15

## 2020-03-04 NOTE — PROGRESS NOTE ADULT - SUBJECTIVE AND OBJECTIVE BOX
Interval History:  3/4/20: Patient reports feeling slightly more tired today compared to yesterday. Denies having any dizziness. Denies unsteady gait.    MEDICATIONS  (STANDING):  amLODIPine   Tablet 10 milliGRAM(s) Oral daily  aspirin  chewable 81 milliGRAM(s) Oral daily  atorvastatin 40 milliGRAM(s) Oral at bedtime  dextrose 5%. 1000 milliLiter(s) (50 mL/Hr) IV Continuous <Continuous>  dextrose 50% Injectable 12.5 Gram(s) IV Push once  dextrose 50% Injectable 25 Gram(s) IV Push once  dextrose 50% Injectable 25 Gram(s) IV Push once  heparin  Injectable 5000 Unit(s) SubCutaneous every 8 hours  insulin glargine Injectable (LANTUS) 25 Unit(s) SubCutaneous at bedtime  insulin lispro (HumaLOG) corrective regimen sliding scale   SubCutaneous three times a day before meals  insulin lispro (HumaLOG) corrective regimen sliding scale   SubCutaneous at bedtime  lacosamide 100 milliGRAM(s) Oral <User Schedule>  lacosamide 75 milliGRAM(s) Oral <User Schedule>  levETIRAcetam 1000 milliGRAM(s) Oral two times a day  levothyroxine 75 MICROGram(s) Oral daily  metoprolol tartrate 50 milliGRAM(s) Oral two times a day  OXcarbazepine 450 milliGRAM(s) Oral <User Schedule>  OXcarbazepine 300 milliGRAM(s) Oral <User Schedule>  oxybutynin 5 milliGRAM(s) Oral at bedtime  pantoprazole    Tablet 40 milliGRAM(s) Oral before breakfast  sodium chloride 0.9%. 1000 milliLiter(s) (75 mL/Hr) IV Continuous <Continuous>    MEDICATIONS  (PRN):  acetaminophen   Tablet .. 650 milliGRAM(s) Oral every 4 hours PRN Mild Pain (1 - 3)  dextrose 40% Gel 15 Gram(s) Oral once PRN Blood Glucose LESS THAN 70 milliGRAM(s)/deciliter  glucagon  Injectable 1 milliGRAM(s) IntraMuscular once PRN Glucose LESS THAN 70 milligrams/deciliter  ondansetron Injectable 4 milliGRAM(s) IV Push every 6 hours PRN Nausea      Allergies    iodine (Unknown)    Intolerances        PHYSICAL EXAM:  Vital Signs Last 24 Hrs  T(F): 98.1 (03-04-20 @ 10:42)  HR: 65 (03-04-20 @ 10:42)  BP: 153/76 (03-04-20 @ 10:42)  RR: 18 (03-04-20 @ 10:42)    GENERAL: NAD, well-groomed, well-developed  HEAD:  Atraumatic, Normocephalic  Neuro:  Awake, alert, no aphasia  CN: PERRL, EOMI, no nystagmus, no facial weakness, tongue protrudes in the midline  motor: normal tone, no pronator drift, full strength in all four extremities  sensory: intact to light touch  coordination: finger to nose intact bilaterally  DTRs: symmetric, plantar responses flexor bilaterally    LABS:    03-04    138  |  110<H>  |  26<H>  ----------------------------<  103<H>  4.3   |  23  |  1.41<H>    Ca    8.4<L>      04 Mar 2020 07:59            RADIOLOGY & ADDITIONAL STUDIES:

## 2020-03-04 NOTE — PROGRESS NOTE ADULT - ASSESSMENT
Mr. Amato is a 74 year old man with a history of focal epilepsy with impaired awareness.  He now presents for video EEG monitoring to determine if there are ongoing seizure given new memory disturbance and possible parasomnias vs nocturnal seizures.    So far EEG has shown mild generalized slowing at times more so on the right, but no definite events or epileptiform activity.  Trying to simplify AEDs.  -Will increase Vimpat to 100 mg BID  -Decrease oxcarbazepine 300 mg BID  -Continue Keppra 1000 mg BID.    Plan will be for discharge tomorrow with schedule given to gradually increase Vimpat and decrease Oxcarbazepine.        Will follow.

## 2020-03-04 NOTE — PROGRESS NOTE ADULT - ASSESSMENT
74 year old man with a history of focal epilepsy with impaired awareness.  He now presents for video EEG monitoring to determine if there are ongoing seizure given new memory disturbance and possible parasomnias vs nocturnal seizures.    *Seizure disorder  - Neurology consult appreciated  - EMU admission  - video EEG as per Neuro x 48-72 hrs  - AED as per Neuro  - Vimpat 50 mg in the AM and 75 mg at night  - Keppra 1000 mg BID  - Oxcarb 450 mg BID.  - monitor  - seizure precautions    *DM- ISS  - monitor  - as per humberto A1c 5.9 checked 2 weeks ago with PCP Dr UMANZOR  - Diabetic diet  - lantus adjusted     *HTN- monitor  - resume BP meds    *history of Stent- on aspirin    *hypothyroid- on synthroid    *HLD- on statin  check Lipid panel    *CKD 3- as per patient he has stage 1 kidney disease- creatinine 1.41  low dose iv fluids completed  better this morning    DVT PPX: heparin s/q    Case d/w team and MD on IDR. Plan explained to patient and all of their concerns were addressed.

## 2020-03-04 NOTE — PROGRESS NOTE ADULT - SUBJECTIVE AND OBJECTIVE BOX
74 year old man with a history of  DM, HTN, Hypothyroid, Stents in 2019- diabetic neuropathy, CKD 3, childhood epilepsy until age 11 and recurrence of seizures at age 68.  Recently he has had possible breakthrough complex partial seizures.  He reports to increasing word finding difficulty and increasing memory loss.  He reports that he was having more frequent seizures until about one year ago when Vimpat was added.      3/4- patient was seen and examined. No acute overnight event, no seizures overnight. EEG ongoing.     Vital Signs Last 24 Hrs  T(C): 36.9 (04 Mar 2020 05:27), Max: 37.2 (03 Mar 2020 21:24)  T(F): 98.5 (04 Mar 2020 05:27), Max: 98.9 (03 Mar 2020 21:24)  HR: 62 (04 Mar 2020 05:27) (58 - 68)  BP: 171/73 (04 Mar 2020 05:27) (151/69 - 171/73)  BP(mean): --  RR: 18 (04 Mar 2020 05:27) (18 - 18)  SpO2: 94% (04 Mar 2020 05:27) (94% - 96%)    REVIEW OF SYSTEM  CONSTITUTIONAL: + weakness, no fevers or chills  EYES/ENT: + blurry vision   NECK: No pain or stiffness  RESPIRATORY: No cough, wheezing, hemoptysis; No shortness of breath  CARDIOVASCULAR: No chest pain or palpitations  GASTROINTESTINAL: No abdominal or epigastric pain. No nausea, vomiting, or hematemesis; No diarrhea or constipation. No melena or hematochezia.  GENITOURINARY: incontinent  NEUROLOGICAL: numbness on LE- due to neuropathy  SKIN: No itching, burning, rashes, or lesions   All other review of systems is negative unless indicated above.    PE:  Constitutional: NAD, laying in bed  HEENT: NC/AT  Back: no tenderness  Respiratory: respirations even and non labored, LCTA  Cardiovascular: S1S2 regular, no murmurs  Abdomen: soft, not tender, not distended, positive BS  Genitourinary: voiding  Musculoskeletal: no muscle tenderness, no joint swelling or tenderness  Extremities: no pedal edema   Neurological: no focal deficits    03-04    138  |  110<H>  |  26<H>  ----------------------------<  103<H>  4.3   |  23  |  1.41<H>    Ca    8.4<L>      04 Mar 2020 07:59    TPro  6.3  /  Alb  3.2<L>  /  TBili  0.3  /  DBili  x   /  AST  18  /  ALT  17  /  AlkPhos  81  03-02                          12.0   7.50  )-----------( 199      ( 02 Mar 2020 12:57 )             34.5     MEDICATIONS  (STANDING):  amLODIPine   Tablet 10 milliGRAM(s) Oral daily  aspirin  chewable 81 milliGRAM(s) Oral daily  atorvastatin 40 milliGRAM(s) Oral at bedtime  dextrose 5%. 1000 milliLiter(s) (50 mL/Hr) IV Continuous <Continuous>  dextrose 50% Injectable 12.5 Gram(s) IV Push once  dextrose 50% Injectable 25 Gram(s) IV Push once  dextrose 50% Injectable 25 Gram(s) IV Push once  heparin  Injectable 5000 Unit(s) SubCutaneous every 8 hours  insulin glargine Injectable (LANTUS) 25 Unit(s) SubCutaneous at bedtime  insulin lispro (HumaLOG) corrective regimen sliding scale   SubCutaneous three times a day before meals  insulin lispro (HumaLOG) corrective regimen sliding scale   SubCutaneous at bedtime  lacosamide 100 milliGRAM(s) Oral <User Schedule>  lacosamide 75 milliGRAM(s) Oral <User Schedule>  levETIRAcetam 1000 milliGRAM(s) Oral two times a day  levothyroxine 75 MICROGram(s) Oral daily  metoprolol tartrate 50 milliGRAM(s) Oral two times a day  OXcarbazepine 450 milliGRAM(s) Oral <User Schedule>  OXcarbazepine 300 milliGRAM(s) Oral <User Schedule>  oxybutynin 5 milliGRAM(s) Oral at bedtime  pantoprazole    Tablet 40 milliGRAM(s) Oral before breakfast  sodium chloride 0.9%. 1000 milliLiter(s) (75 mL/Hr) IV Continuous <Continuous>    MEDICATIONS  (PRN):  acetaminophen   Tablet .. 650 milliGRAM(s) Oral every 4 hours PRN Mild Pain (1 - 3)  dextrose 40% Gel 15 Gram(s) Oral once PRN Blood Glucose LESS THAN 70 milliGRAM(s)/deciliter  glucagon  Injectable 1 milliGRAM(s) IntraMuscular once PRN Glucose LESS THAN 70 milligrams/deciliter  ondansetron Injectable 4 milliGRAM(s) IV Push every 6 hours PRN Nausea 74 year old man with a history of  DM, HTN, Hypothyroid, Stents in 2019- diabetic neuropathy, CKD 3, childhood epilepsy until age 11 and recurrence of seizures at age 68.  Recently he has had possible breakthrough complex partial seizures.  He reports to increasing word finding difficulty and increasing memory loss.  He reports that he was having more frequent seizures until about one year ago when Vimpat was added.      3/4- patient was seen and examined. No acute overnight event, no seizures overnight. EEG ongoing.     Vital Signs Last 24 Hrs  T(C): 36.9 (04 Mar 2020 05:27), Max: 37.2 (03 Mar 2020 21:24)  T(F): 98.5 (04 Mar 2020 05:27), Max: 98.9 (03 Mar 2020 21:24)  HR: 62 (04 Mar 2020 05:27) (58 - 68)  BP: 171/73 (04 Mar 2020 05:27) (151/69 - 171/73)  BP(mean): --  RR: 18 (04 Mar 2020 05:27) (18 - 18)  SpO2: 94% (04 Mar 2020 05:27) (94% - 96%)    REVIEW OF SYSTEM  CONSTITUTIONAL: + weakness, no fevers or chills  EYES/ENT: + blurry vision   NECK: No pain or stiffness  RESPIRATORY: No cough, wheezing, hemoptysis; No shortness of breath  CARDIOVASCULAR: No chest pain or palpitations  GASTROINTESTINAL: No abdominal or epigastric pain. No nausea, vomiting, or hematemesis; No diarrhea or constipation. No melena or hematochezia.  GENITOURINARY: incontinent  NEUROLOGICAL: numbness on LE- due to neuropathy  SKIN: No itching, burning, rashes, or lesions   All other review of systems is negative unless indicated above.    PE:  T(C): 36.7 (04 Mar 2020 10:42), Max: 37.2 (03 Mar 2020 21:24)  T(F): 98.1 (04 Mar 2020 10:42), Max: 98.9 (03 Mar 2020 21:24)  HR: 65 (04 Mar 2020 10:42) (62 - 68)  BP: 153/76 (04 Mar 2020 10:42) (153/76 - 171/73)  RR: 18 (04 Mar 2020 10:42) (18 - 18)  SpO2: 96% (04 Mar 2020 10:42) (94% - 96%)  Constitutional: NAD, laying in bed  HEENT: NC/AT  Back: no tenderness  Respiratory: respirations even and non labored, LCTA  Cardiovascular: S1S2 regular, no murmurs  Abdomen: soft, not tender, not distended, positive BS  Genitourinary: voiding  Musculoskeletal: no muscle tenderness, no joint swelling or tenderness  Extremities: no pedal edema   Neurological: no focal deficits    Labs:             03-04    138  |  110<H>  |  26<H>  ----------------------------<  103<H>  4.3   |  23  |  1.41<H>    Ca    8.4<L>      04 Mar 2020 07:59

## 2020-03-05 ENCOUNTER — TRANSCRIPTION ENCOUNTER (OUTPATIENT)
Age: 75
End: 2020-03-05

## 2020-03-05 VITALS
RESPIRATION RATE: 19 BRPM | HEART RATE: 62 BPM | TEMPERATURE: 98 F | SYSTOLIC BLOOD PRESSURE: 157 MMHG | OXYGEN SATURATION: 98 % | DIASTOLIC BLOOD PRESSURE: 70 MMHG

## 2020-03-05 LAB
ANION GAP SERPL CALC-SCNC: 4 MMOL/L — LOW (ref 5–17)
BUN SERPL-MCNC: 25 MG/DL — HIGH (ref 7–23)
CALCIUM SERPL-MCNC: 8.3 MG/DL — LOW (ref 8.5–10.1)
CHLORIDE SERPL-SCNC: 108 MMOL/L — SIGNIFICANT CHANGE UP (ref 96–108)
CO2 SERPL-SCNC: 26 MMOL/L — SIGNIFICANT CHANGE UP (ref 22–31)
CREAT SERPL-MCNC: 1.39 MG/DL — HIGH (ref 0.5–1.3)
GLUCOSE SERPL-MCNC: 106 MG/DL — HIGH (ref 70–99)
POTASSIUM SERPL-MCNC: 4 MMOL/L — SIGNIFICANT CHANGE UP (ref 3.5–5.3)
POTASSIUM SERPL-SCNC: 4 MMOL/L — SIGNIFICANT CHANGE UP (ref 3.5–5.3)
SODIUM SERPL-SCNC: 138 MMOL/L — SIGNIFICANT CHANGE UP (ref 135–145)

## 2020-03-05 PROCEDURE — 95720 EEG PHY/QHP EA INCR W/VEEG: CPT

## 2020-03-05 PROCEDURE — 99238 HOSP IP/OBS DSCHRG MGMT 30/<: CPT

## 2020-03-05 PROCEDURE — 99232 SBSQ HOSP IP/OBS MODERATE 35: CPT

## 2020-03-05 RX ORDER — OXCARBAZEPINE 300 MG/1
1 TABLET, FILM COATED ORAL
Qty: 0 | Refills: 0 | DISCHARGE
Start: 2020-03-05

## 2020-03-05 RX ORDER — LEVOTHYROXINE SODIUM 125 MCG
1 TABLET ORAL
Qty: 0 | Refills: 0 | DISCHARGE

## 2020-03-05 RX ORDER — METOPROLOL TARTRATE 50 MG
1 TABLET ORAL
Qty: 0 | Refills: 0 | DISCHARGE

## 2020-03-05 RX ORDER — PANTOPRAZOLE SODIUM 20 MG/1
1 TABLET, DELAYED RELEASE ORAL
Qty: 0 | Refills: 0 | DISCHARGE
Start: 2020-03-05

## 2020-03-05 RX ORDER — ACETAMINOPHEN 500 MG
2 TABLET ORAL
Qty: 0 | Refills: 0 | DISCHARGE
Start: 2020-03-05

## 2020-03-05 RX ORDER — LOSARTAN POTASSIUM 100 MG/1
1 TABLET, FILM COATED ORAL
Qty: 0 | Refills: 0 | DISCHARGE

## 2020-03-05 RX ORDER — LACOSAMIDE 50 MG/1
1 TABLET ORAL
Qty: 60 | Refills: 0
Start: 2020-03-05 | End: 2020-04-03

## 2020-03-05 RX ORDER — LEVOTHYROXINE SODIUM 125 MCG
1 TABLET ORAL
Qty: 0 | Refills: 0 | DISCHARGE
Start: 2020-03-05

## 2020-03-05 RX ORDER — METOPROLOL TARTRATE 50 MG
1 TABLET ORAL
Qty: 0 | Refills: 0 | DISCHARGE
Start: 2020-03-05

## 2020-03-05 RX ORDER — LACOSAMIDE 50 MG/1
1.5 TABLET ORAL
Qty: 0 | Refills: 0 | DISCHARGE

## 2020-03-05 RX ORDER — PANTOPRAZOLE SODIUM 20 MG/1
1 TABLET, DELAYED RELEASE ORAL
Qty: 0 | Refills: 0 | DISCHARGE

## 2020-03-05 RX ORDER — IBUPROFEN 200 MG
1 TABLET ORAL
Qty: 0 | Refills: 0 | DISCHARGE

## 2020-03-05 RX ORDER — OXYBUTYNIN CHLORIDE 5 MG
1 TABLET ORAL
Qty: 0 | Refills: 0 | DISCHARGE
Start: 2020-03-05

## 2020-03-05 RX ORDER — OXCARBAZEPINE 300 MG/1
1.5 TABLET, FILM COATED ORAL
Qty: 0 | Refills: 0 | DISCHARGE

## 2020-03-05 RX ORDER — AMLODIPINE BESYLATE 2.5 MG/1
1 TABLET ORAL
Qty: 0 | Refills: 0 | DISCHARGE

## 2020-03-05 RX ORDER — AMLODIPINE BESYLATE 2.5 MG/1
1 TABLET ORAL
Qty: 30 | Refills: 0
Start: 2020-03-05 | End: 2020-04-03

## 2020-03-05 RX ORDER — OXYBUTYNIN CHLORIDE 5 MG
1 TABLET ORAL
Qty: 0 | Refills: 0 | DISCHARGE

## 2020-03-05 RX ORDER — LACOSAMIDE 50 MG/1
1 TABLET ORAL
Qty: 0 | Refills: 0 | DISCHARGE

## 2020-03-05 RX ADMIN — LACOSAMIDE 100 MILLIGRAM(S): 50 TABLET ORAL at 06:02

## 2020-03-05 RX ADMIN — AMLODIPINE BESYLATE 10 MILLIGRAM(S): 2.5 TABLET ORAL at 06:01

## 2020-03-05 RX ADMIN — Medication 75 MICROGRAM(S): at 06:01

## 2020-03-05 RX ADMIN — OXCARBAZEPINE 300 MILLIGRAM(S): 300 TABLET, FILM COATED ORAL at 06:01

## 2020-03-05 RX ADMIN — Medication 1: at 12:25

## 2020-03-05 RX ADMIN — LEVETIRACETAM 1000 MILLIGRAM(S): 250 TABLET, FILM COATED ORAL at 06:01

## 2020-03-05 RX ADMIN — HEPARIN SODIUM 5000 UNIT(S): 5000 INJECTION INTRAVENOUS; SUBCUTANEOUS at 06:01

## 2020-03-05 RX ADMIN — PANTOPRAZOLE SODIUM 40 MILLIGRAM(S): 20 TABLET, DELAYED RELEASE ORAL at 06:01

## 2020-03-05 RX ADMIN — Medication 81 MILLIGRAM(S): at 11:31

## 2020-03-05 RX ADMIN — Medication 50 MILLIGRAM(S): at 06:01

## 2020-03-05 NOTE — DISCHARGE NOTE PROVIDER - NSDCPNSUBOBJ_GEN_ALL_CORE
REVIEW OF SYSTEM    CONSTITUTIONAL: + weakness, no fevers or chills    EYES/ENT: + blurry vision     NECK: No pain or stiffness    RESPIRATORY: No cough, wheezing, hemoptysis; No shortness of breath    CARDIOVASCULAR: No chest pain or palpitations    GASTROINTESTINAL: No abdominal or epigastric pain. No nausea, vomiting, or hematemesis; No diarrhea or constipation. No melena or hematochezia.    GENITOURINARY: incontinent    NEUROLOGICAL: numbness on LE- due to neuropathy    SKIN: No itching, burning, rashes, or lesions     All other review of systems is negative unless indicated above.            PE    Constitutional: NAD, laying in bed    HEENT: NC/AT    Back: no tenderness    Respiratory: respirations even and non labored, LCTA    Cardiovascular: S1S2 regular, no murmurs    Abdomen: soft, not tender, not distended, positive BS    Genitourinary: voiding    Musculoskeletal: no muscle tenderness, no joint swelling or tenderness    Extremities: no pedal edema             *Seizure disorder    - Neurology consult appreciated    - EMU admission    - video EEG as per Neuro x 48-72 hrs    - AED as per Neuro    - Vimpat 100BID    - Keppra 1000 mg BID    - Oxcarb 300mg BID    - will need further titration as an outpatient    - monitor    - seizure precautions        *DM- ISS- a1c 5.6     - monitor    [facility] with PCP Dr UMANZOR    - Diabetic diet    - lantus adjusted         *HTN- monitor    - resume BP meds    - amlodipine incrased to 10 QD        *history of Stent- on aspirin        *hypothyroid- on synthroid        *HLD- on statin        *CKD 3- as per patient he has stage 1 kidney disease- creatinine 1.39    low dose iv fluids completed            DVT PPX: heparin s/q    Plan for dc today.

## 2020-03-05 NOTE — DISCHARGE NOTE NURSING/CASE MANAGEMENT/SOCIAL WORK - PATIENT PORTAL LINK FT
You can access the FollowMyHealth Patient Portal offered by [facility] by registering at the following website: [email or web address]. By joining [employer]’s FollowMyHealth portal, you will also be able to view your health information using other applications (apps) compatible with our system.

## 2020-03-05 NOTE — DISCHARGE NOTE PROVIDER - NSDCFUSCHEDAPPT_GEN_ALL_CORE_FT
WHITLEY CHEEMA ; 05/18/2020 ; [facility] [address] WHITLEY CHEEMA ; 05/18/2020 ; [facility] [address]

## 2020-03-05 NOTE — PROGRESS NOTE ADULT - SUBJECTIVE AND OBJECTIVE BOX
Interval History:  3/5/20: No new complaints. He has been sleeping more but states that he is not on the same schedule as he was as an outpatient.    MEDICATIONS  (STANDING):  amLODIPine   Tablet 10 milliGRAM(s) Oral daily  aspirin  chewable 81 milliGRAM(s) Oral daily  atorvastatin 40 milliGRAM(s) Oral at bedtime  dextrose 5%. 1000 milliLiter(s) (50 mL/Hr) IV Continuous <Continuous>  dextrose 50% Injectable 12.5 Gram(s) IV Push once  dextrose 50% Injectable 25 Gram(s) IV Push once  dextrose 50% Injectable 25 Gram(s) IV Push once  heparin  Injectable 5000 Unit(s) SubCutaneous every 8 hours  insulin glargine Injectable (LANTUS) 25 Unit(s) SubCutaneous at bedtime  insulin lispro (HumaLOG) corrective regimen sliding scale   SubCutaneous three times a day before meals  insulin lispro (HumaLOG) corrective regimen sliding scale   SubCutaneous at bedtime  lacosamide 100 milliGRAM(s) Oral two times a day  levETIRAcetam 1000 milliGRAM(s) Oral two times a day  levothyroxine 75 MICROGram(s) Oral daily  metoprolol tartrate 50 milliGRAM(s) Oral two times a day  OXcarbazepine 300 milliGRAM(s) Oral two times a day  oxybutynin 5 milliGRAM(s) Oral at bedtime  pantoprazole    Tablet 40 milliGRAM(s) Oral before breakfast  sodium chloride 0.9%. 1000 milliLiter(s) (75 mL/Hr) IV Continuous <Continuous>    MEDICATIONS  (PRN):  acetaminophen   Tablet .. 650 milliGRAM(s) Oral every 4 hours PRN Mild Pain (1 - 3)  dextrose 40% Gel 15 Gram(s) Oral once PRN Blood Glucose LESS THAN 70 milliGRAM(s)/deciliter  glucagon  Injectable 1 milliGRAM(s) IntraMuscular once PRN Glucose LESS THAN 70 milligrams/deciliter  ondansetron Injectable 4 milliGRAM(s) IV Push every 6 hours PRN Nausea      Allergies    iodine (Unknown)    Intolerances        PHYSICAL EXAM:  Vital Signs Last 24 Hrs  T(F): 98.5 (03-05-20 @ 05:25)  HR: 63 (03-05-20 @ 05:25)  BP: 174/70 (03-05-20 @ 05:25)  RR: 19 (03-05-20 @ 05:25)    GENERAL: NAD, well-groomed, well-developed  HEAD:  Atraumatic, Normocephalic  Neuro:  Awake, alert, no aphasia  CN: PERRL, EOMI, no nystagmus, no facial weakness, tongue protrudes in the midline  motor: normal tone, no pronator drift, full strength in all four extremities  sensory: intact to light touch  coordination: finger to nose intact bilaterally  DTRs: symmetric, plantar responses flexor bilaterally    LABS:    03-04    138  |  110<H>  |  26<H>  ----------------------------<  103<H>  4.3   |  23  |  1.41<H>    Ca    8.4<L>      04 Mar 2020 07:59            RADIOLOGY & ADDITIONAL STUDIES:  EEG: mild to moderate generalized slowing. No definite epileptiform discharges seen.

## 2020-03-05 NOTE — DISCHARGE NOTE PROVIDER - NSDCMRMEDTOKEN_GEN_ALL_CORE_FT
acetaminophen 325 mg oral tablet: 2 tab(s) orally every 4 hours, As needed, Mild Pain (1 - 3)  amLODIPine 10 mg oral tablet: 1 tab(s) orally once a day  aspirin 81 mg oral tablet: 1 tab(s) orally once a day  Crestor 10 mg oral tablet: 1 tab(s) orally once a day (at bedtime)  HumaLOG 100 units/mL subcutaneous solution: subcutaneous 2 times a day (before meals) - breakfast/lunch  Keppra 500 mg oral tablet: 2 tab(s) orally 2 times a day  ketoconazole 2% topical cream: Apply topically to affected area once a day, As Needed on back   lacosamide 100 mg oral tablet: 1 tab(s) orally 2 times a day MDD:2tabs  Lantus Solostar Pen 100 units/mL subcutaneous solution: 30 unit(s) subcutaneous once a day (at bedtime)  Lasix 20 mg oral tablet: 1 tab(s) orally every other day  levothyroxine 75 mcg (0.075 mg) oral tablet: 1 tab(s) orally once a day  metoprolol tartrate 50 mg oral tablet: 1 tab(s) orally 2 times a day  OXcarbazepine 300 mg oral tablet: 1 tab(s) orally 2 times a day  oxybutynin 5 mg oral tablet: 1 tab(s) orally once a day (at bedtime)  pantoprazole 40 mg oral delayed release tablet: 1 tab(s) orally once a day (before a meal)

## 2020-03-05 NOTE — DISCHARGE NOTE PROVIDER - CARE PROVIDER_API CALL
Co, Marlon OLIVERA)  Internal Medicine  [address] [place] [address]  Phone: [phone]  Fax: [phone]  Follow Up Time:     Juliet Baptiste)  Neurology  General  [address] [place] [address], [place] [address]  Phone: [phone]  Fax: [phone]  Follow Up Time:

## 2020-03-05 NOTE — DISCHARGE NOTE PROVIDER - HOSPITAL COURSE
74 year old man with a history of  DM, HTN, Hypothyroid, Stents in 2019- diabetic neuropathy, CKD 3, childhood epilepsy until age 11 and recurrence of seizures at age 68.    Recently he has had possible breakthrough complex partial seizures.    He reports to increasing word finding difficulty and increasing memory loss.    He reports that he was having more frequent seizures until about one year ago when Vimpat was added. Patient admitted for medication adjustment and EEG monitoring.         Vital Signs Last 24 Hrs    T(C): 36.8 (05 Mar 2020 10:40), Max: 36.9 (05 Mar 2020 05:25)    T(F): 98.2 (05 Mar 2020 10:40), Max: 98.5 (05 Mar 2020 05:25)    HR: 62 (05 Mar 2020 10:40) (62 - 67)    BP: 157/70 (05 Mar 2020 10:40) (157/70 - 174/70)    BP(mean): --    RR: 19 (05 Mar 2020 10:40) (19 - 20)    SpO2: 98% (05 Mar 2020 10:40) (92% - 98%)

## 2020-03-05 NOTE — DISCHARGE NOTE PROVIDER - NSDCCPCAREPLAN_GEN_ALL_CORE_FT
PRINCIPAL DISCHARGE DIAGNOSIS  Diagnosis: Seizure  Assessment and Plan of Treatment: f/u wt Dr Baptiste as an outpatient.   continue Keppra 1000mg twice daily.  c/w Vimpat 100mg twice daily and Oxcarbazepine 300mg twice daily.  further adjustments with AED as an outpatient.      SECONDARY DISCHARGE DIAGNOSES  Diagnosis: Diabetes mellitus  Assessment and Plan of Treatment: avoid concentrated sweets. continue with previous lantus coverage.  hemoglobin a1c is 5.6    Diagnosis: Hypertension  Assessment and Plan of Treatment: losartan on hold due to increased creatinine function. Amlodipine increased to 10mg - you will need to have your creatinine rechecked with your PCP as losartan will need to be restarted as an outpatient.    Diagnosis: Acute-on-chronic kidney injury  Assessment and Plan of Treatment: creatinine in 1.39 on DC

## 2020-03-05 NOTE — PROGRESS NOTE ADULT - ASSESSMENT
Mr. Amato is a 74 year old man with a history of focal epilepsy with impaired awareness.  He now presents for video EEG monitoring to determine if there are ongoing seizure given new memory disturbance and possible parasomnias vs nocturnal seizures.    So far EEG has shown mild to generalized slowing but no epileptiform activities.  Plan is to help simplify medication regimen and try for dual anticonvulsant therapy.  So far he is tolerating increased dose of Vimpat.    Plan is for discharge today with following medication regimen:  -Vimpat 100 mg BID  -Keppra 1000 mg BID  -Oxcarbazepine 300 mg BID.    Follow with Dr. Baptiste in the office for continued titration of medication with hopes of eventual elimination of Oxcarbazepine.

## 2020-03-10 DIAGNOSIS — Q85.8 OTHER PHAKOMATOSES, NOT ELSEWHERE CLASSIFIED: ICD-10-CM

## 2020-03-10 DIAGNOSIS — I12.9 HYPERTENSIVE CHRONIC KIDNEY DISEASE WITH STAGE 1 THROUGH STAGE 4 CHRONIC KIDNEY DISEASE, OR UNSPECIFIED CHRONIC KIDNEY DISEASE: ICD-10-CM

## 2020-03-10 DIAGNOSIS — G40.209 LOCALIZATION-RELATED (FOCAL) (PARTIAL) SYMPTOMATIC EPILEPSY AND EPILEPTIC SYNDROMES WITH COMPLEX PARTIAL SEIZURES, NOT INTRACTABLE, WITHOUT STATUS EPILEPTICUS: ICD-10-CM

## 2020-03-10 DIAGNOSIS — E11.40 TYPE 2 DIABETES MELLITUS WITH DIABETIC NEUROPATHY, UNSPECIFIED: ICD-10-CM

## 2020-03-10 DIAGNOSIS — E78.5 HYPERLIPIDEMIA, UNSPECIFIED: ICD-10-CM

## 2020-03-10 DIAGNOSIS — Z95.5 PRESENCE OF CORONARY ANGIOPLASTY IMPLANT AND GRAFT: ICD-10-CM

## 2020-03-10 DIAGNOSIS — I25.10 ATHEROSCLEROTIC HEART DISEASE OF NATIVE CORONARY ARTERY WITHOUT ANGINA PECTORIS: ICD-10-CM

## 2020-03-10 DIAGNOSIS — G47.50 PARASOMNIA, UNSPECIFIED: ICD-10-CM

## 2020-03-10 DIAGNOSIS — Z79.82 LONG TERM (CURRENT) USE OF ASPIRIN: ICD-10-CM

## 2020-03-10 DIAGNOSIS — J45.909 UNSPECIFIED ASTHMA, UNCOMPLICATED: ICD-10-CM

## 2020-03-10 DIAGNOSIS — N18.3 CHRONIC KIDNEY DISEASE, STAGE 3 (MODERATE): ICD-10-CM

## 2020-03-10 DIAGNOSIS — E11.22 TYPE 2 DIABETES MELLITUS WITH DIABETIC CHRONIC KIDNEY DISEASE: ICD-10-CM

## 2020-04-01 PROCEDURE — G9005: CPT

## 2020-05-04 ENCOUNTER — RX RENEWAL (OUTPATIENT)
Age: 75
End: 2020-05-04

## 2020-05-05 ENCOUNTER — APPOINTMENT (OUTPATIENT)
Dept: NEUROLOGY | Facility: CLINIC | Age: 75
End: 2020-05-05
Payer: MEDICARE

## 2020-05-05 PROCEDURE — 99443: CPT | Mod: CR

## 2020-07-16 ENCOUNTER — TRANSCRIPTION ENCOUNTER (OUTPATIENT)
Age: 75
End: 2020-07-16

## 2020-07-17 ENCOUNTER — APPOINTMENT (OUTPATIENT)
Dept: DISASTER EMERGENCY | Facility: CLINIC | Age: 75
End: 2020-07-17

## 2020-09-14 ENCOUNTER — RX RENEWAL (OUTPATIENT)
Age: 75
End: 2020-09-14

## 2020-09-24 ENCOUNTER — APPOINTMENT (OUTPATIENT)
Dept: NEUROLOGY | Facility: CLINIC | Age: 75
End: 2020-09-24
Payer: MEDICARE

## 2020-09-24 VITALS
TEMPERATURE: 98 F | BODY MASS INDEX: 25.9 KG/M2 | HEART RATE: 62 BPM | WEIGHT: 185 LBS | SYSTOLIC BLOOD PRESSURE: 136 MMHG | DIASTOLIC BLOOD PRESSURE: 81 MMHG | HEIGHT: 71 IN

## 2020-09-24 PROCEDURE — 99214 OFFICE O/P EST MOD 30 MIN: CPT

## 2020-09-24 RX ORDER — FUROSEMIDE 20 MG/1
20 TABLET ORAL
Refills: 0 | Status: ACTIVE | COMMUNITY

## 2020-09-24 RX ORDER — ASPIRIN 81 MG
81 TABLET, DELAYED RELEASE (ENTERIC COATED) ORAL
Refills: 0 | Status: ACTIVE | COMMUNITY

## 2020-09-24 NOTE — REASON FOR VISIT
[Follow-Up: _____] : a [unfilled] follow-up visit [FreeTextEntry1] : Follow up for pt with Seizure disorder and stable

## 2020-09-24 NOTE — PHYSICAL EXAM
[General Appearance - Alert] : alert [General Appearance - In No Acute Distress] : in no acute distress [General Appearance - Well Nourished] : well nourished [General Appearance - Well-Appearing] : healthy appearing [Affect] : the affect was normal [Person] : oriented to person [Place] : oriented to place [Time] : oriented to time [Short Term Intact] : short term memory intact [Remote Intact] : remote memory impaired [Registration Intact] : recent registration memory intact [Concentration Intact] : normal concentrating ability [Visual Intact] : visual attention was ~T not ~L decreased [Naming Objects] : no difficulty naming common objects [Repeating Phrases] : no difficulty repeating a phrase [Writing A Sentence] : no difficulty writing a sentence [Fluency] : fluency intact [Comprehension] : comprehension intact [Reading] : reading intact [Past History] : adequate knowledge of personal past history [Cranial Nerves Optic (II)] : visual acuity intact bilaterally,  visual fields full to confrontation, pupils equal round and reactive to light [Cranial Nerves Oculomotor (III)] : extraocular motion intact [Cranial Nerves Trigeminal (V)] : facial sensation intact symmetrically [Cranial Nerves Facial (VII)] : face symmetrical [Cranial Nerves Vestibulocochlear (VIII)] : hearing was intact bilaterally [Cranial Nerves Glossopharyngeal (IX)] : tongue and palate midline [Cranial Nerves Accessory (XI - Cranial And Spinal)] : head turning and shoulder shrug symmetric [Cranial Nerves Hypoglossal (XII)] : there was no tongue deviation with protrusion [Motor Strength] : muscle strength was normal in all four extremities [No Muscle Atrophy] : normal bulk in all four extremities [Motor Handedness Right-Handed] : the patient is right hand dominant [Sensation Tactile Decrease] : light touch was intact [Romberg's Sign] : a positive Romberg's sign was present [Limited Balance] : the patient's balance was impaired [Past-pointing] : there was no past-pointing [Tremor] : no tremor present [1+] : Patella left 1+ [0] : Ankle jerk left 0 [Plantar Reflex Right Only] : normal on the right [Plantar Reflex Left Only] : normal on the left [FreeTextEntry4] : verbose, recall 3/3 today. names 4/5 [FreeTextEntry8] : unsteady [Sclera] : the sclera and conjunctiva were normal [Outer Ear] : the ears and nose were normal in appearance [Oropharynx] : the oropharynx was normal [Neck Appearance] : the appearance of the neck was normal [Auscultation Breath Sounds / Voice Sounds] : lungs were clear to auscultation bilaterally [Heart Rate And Rhythm] : heart rate was normal and rhythm regular [Heart Sounds] : normal S1 and S2 [Heart Sounds Gallop] : no gallops [Murmurs] : no murmurs [Heart Sounds Pericardial Friction Rub] : no pericardial rub [Full Pulse] : the pedal pulses are present [Edema] : there was no peripheral edema [Bowel Sounds] : normal bowel sounds [Abdomen Soft] : soft [Abdomen Tenderness] : non-tender [Abdomen Mass (___ Cm)] : no abdominal mass palpated [No CVA Tenderness] : no ~M costovertebral angle tenderness [No Spinal Tenderness] : no spinal tenderness [Nail Clubbing] : no clubbing  or cyanosis of the fingernails [Musculoskeletal - Swelling] : no joint swelling seen [Motor Tone] : muscle strength and tone were normal [Skin Color & Pigmentation] : normal skin color and pigmentation [Skin Turgor] : normal skin turgor [] : no rash [FreeTextEntry1] : left flank port wine stain, large. some irritations on abd, arms, excoriation.

## 2020-09-24 NOTE — REVIEW OF SYSTEMS
[Memory Lapses or Loss] : no memory loss [Decr. Concentrating Ability] : decreased concentrating ability [Difficulty with Language] : no ~M difficulty with language [Changed Thought Patterns] : no change in thought patterns [Numbness] : numbness [Tingling] : no tingling [Seizures] : convulsions [As Noted in HPI] : as noted in HPI [Loss Of Hearing] : hearing loss [Negative] : Heme/Lymph [de-identified] : No break through seizures [FreeTextEntry4] : tinnitus/[place]

## 2020-09-24 NOTE — DISCUSSION/SUMMARY
[FreeTextEntry1] : Patient partial complex seizures stable neurologically on medications currently. Complaining about dreaming at nights.\par Recommend checking lab work including levels.\par Recommend followup evaluation in 3 months.\par C continue present medications at this time. \par Will adjust oxcarbazepine does after levels available.\par Patient education provided and discussed with the patient.\par Follow up with you for her medical problems.

## 2020-09-24 NOTE — HISTORY OF PRESENT ILLNESS
[FreeTextEntry1] : He is 75-year-old patient coming here for followup evaluation for partial complex seizures with breakthrough seizures on complaining about increasing short-term memory loss which appears to be improving lately. Complaining about  vivid  dreaming at night's which continues into the morning lately.\par Discussed with him on the phone in May and recommended lab work which he did not do. He feels fine except his lab work appears to be elevated cholesterol levels for which he was recommended to increase in his cholesterol medications lately by his cardiologist. He agrees that he is noncompliant with his medications and eating habits changed.\par No recurrent seizures as per him. Feeling better overall. No new complaints neurologically.

## 2020-11-10 ENCOUNTER — APPOINTMENT (OUTPATIENT)
Dept: NEUROLOGY | Facility: CLINIC | Age: 75
End: 2020-11-10
Payer: MEDICARE

## 2020-11-10 VITALS
BODY MASS INDEX: 25.9 KG/M2 | TEMPERATURE: 97.8 F | WEIGHT: 185 LBS | SYSTOLIC BLOOD PRESSURE: 128 MMHG | HEIGHT: 71 IN | HEART RATE: 64 BPM | DIASTOLIC BLOOD PRESSURE: 78 MMHG

## 2020-11-10 LAB
DM LACOSAMIDE: 1.6 UG/ML
LACOSAMIDE (VIMPAT): 7.3 UG/ML
LEVETIRACETAM SERPL-MCNC: 55.2 MCG/ML
OXCARBAZEPINE SERPL-MCNC: 17 UG/ML

## 2020-11-10 PROCEDURE — 99214 OFFICE O/P EST MOD 30 MIN: CPT

## 2020-11-10 NOTE — DISCUSSION/SUMMARY
[FreeTextEntry1] : Patient with partial complex seizures with breakthrough seizure stable currently. \par Recommend continue Keppra and Vimpat same doses..\par Taper off oxcarbazepine reduced to 150 mg once a day for 2 weeks and then discontinue.\par Recommend repeat lab work in 2 months and followup evaluation after lab work is completed.\par Patient education provided.\par If any problems with recurrent seizures or confusion recommend patient to contact me.\par Patient education provided and discussed with the patient.\par Followup with you further medical problems.\par

## 2020-11-10 NOTE — REASON FOR VISIT
[Follow-Up: _____] : a [unfilled] follow-up visit [FreeTextEntry1] : Follow up for pt with Seizure disorder with Break through seizures

## 2020-11-10 NOTE — HISTORY OF PRESENT ILLNESS
[FreeTextEntry1] : He is 75-year-old patient coming here for followup evaluation for partial complex seizures with breakthrough seizures currently on multiple medications including Keppra, oxcarbazepine, Vimpat. \par In the process of tapering off oxcarbazepine. Recently had lab work done levels were Keppra 55.2, oxcarbazepine level 17.0 and lacosamide level 7.3.\par Patient still having episodes of memory loss and forgetfulness. Coming here for followup evaluation for adjusting medications.

## 2020-11-10 NOTE — REVIEW OF SYSTEMS
[Memory Lapses or Loss] : no memory loss [Decr. Concentrating Ability] : decreased concentrating ability [Difficulty with Language] : no ~M difficulty with language [Changed Thought Patterns] : no change in thought patterns [Numbness] : numbness [Tingling] : no tingling [Seizures] : convulsions [As Noted in HPI] : as noted in HPI [Loss Of Hearing] : hearing loss [Negative] : Heme/Lymph [de-identified] : No break through seizures [FreeTextEntry4] : tinnitus/[place]

## 2020-12-23 ENCOUNTER — APPOINTMENT (OUTPATIENT)
Dept: UROLOGY | Facility: CLINIC | Age: 75
End: 2020-12-23
Payer: MEDICARE

## 2020-12-23 DIAGNOSIS — N40.1 BENIGN PROSTATIC HYPERPLASIA WITH LOWER URINARY TRACT SYMPMS: ICD-10-CM

## 2020-12-23 DIAGNOSIS — N13.8 BENIGN PROSTATIC HYPERPLASIA WITH LOWER URINARY TRACT SYMPMS: ICD-10-CM

## 2020-12-23 PROCEDURE — 99213 OFFICE O/P EST LOW 20 MIN: CPT

## 2020-12-23 NOTE — END OF VISIT
[FreeTextEntry3] : He will try Myrbetriq at the 25 and 50 mg dosages with a follow-up postvoid study in 2 weeks.  Labs ordered as well

## 2020-12-23 NOTE — HISTORY OF PRESENT ILLNESS
[FreeTextEntry1] : This patient is on oxybutynin for incontinence but his insurance company wishes him to try Myrbetriq.

## 2020-12-23 NOTE — PHYSICAL EXAM
[FreeTextEntry1] : The prostate is absent but there is a nodular area anterior on the rectal examination.

## 2021-01-06 LAB
APPEARANCE: CLEAR
BACTERIA: NEGATIVE
BILIRUBIN URINE: NEGATIVE
BLOOD URINE: NEGATIVE
COLOR: NORMAL
GLUCOSE QUALITATIVE U: NEGATIVE
HYALINE CASTS: 1 /LPF
KETONES URINE: NEGATIVE
LEUKOCYTE ESTERASE URINE: NEGATIVE
MICROSCOPIC-UA: NORMAL
NITRITE URINE: NEGATIVE
PH URINE: 6
PROTEIN URINE: ABNORMAL
PSA SERPL-MCNC: 1.08 NG/ML
RED BLOOD CELLS URINE: 3 /HPF
SPECIFIC GRAVITY URINE: 1.02
SQUAMOUS EPITHELIAL CELLS: 1 /HPF
UROBILINOGEN URINE: NORMAL
WHITE BLOOD CELLS URINE: 1 /HPF

## 2021-01-07 LAB — BACTERIA UR CULT: NORMAL

## 2021-01-08 ENCOUNTER — APPOINTMENT (OUTPATIENT)
Dept: UROLOGY | Facility: CLINIC | Age: 76
End: 2021-01-08

## 2021-01-08 ENCOUNTER — APPOINTMENT (OUTPATIENT)
Dept: UROLOGY | Facility: CLINIC | Age: 76
End: 2021-01-08
Payer: MEDICARE

## 2021-01-08 DIAGNOSIS — R32 UNSPECIFIED URINARY INCONTINENCE: ICD-10-CM

## 2021-01-08 PROCEDURE — 51741 ELECTRO-UROFLOWMETRY FIRST: CPT

## 2021-01-08 PROCEDURE — 51798 US URINE CAPACITY MEASURE: CPT | Mod: 59

## 2021-01-08 PROCEDURE — 99213 OFFICE O/P EST LOW 20 MIN: CPT | Mod: 25

## 2021-01-08 NOTE — PHYSICAL EXAM
[General Appearance - Well Developed] : well developed [General Appearance - Well Nourished] : well nourished [Normal Appearance] : normal appearance [Well Groomed] : well groomed [General Appearance - In No Acute Distress] : no acute distress [Abdomen Soft] : soft [Abdomen Tenderness] : non-tender [Costovertebral Angle Tenderness] : no ~M costovertebral angle tenderness [FreeTextEntry1] : The prostate is absent but there is a nodular area anterior on the rectal examination. [Edema] : no peripheral edema [] : no respiratory distress [Respiration, Rhythm And Depth] : normal respiratory rhythm and effort [Exaggerated Use Of Accessory Muscles For Inspiration] : no accessory muscle use [Oriented To Time, Place, And Person] : oriented to person, place, and time [Affect] : the affect was normal [Mood] : the mood was normal [Not Anxious] : not anxious [Normal Station and Gait] : the gait and station were normal for the patient's age [No Focal Deficits] : no focal deficits [No Palpable Adenopathy] : no palpable adenopathy

## 2021-01-08 NOTE — HISTORY OF PRESENT ILLNESS
[FreeTextEntry1] : This patient trial Myrbetriq 25 and 50 mg and noted improvement but no difference between the 2 products.  His flow study today shows mild obstruction and if there is residual urine is minimal.

## 2021-01-11 LAB
DM LACOSAMIDE: 1.9 UG/ML
LACOSAMIDE (VIMPAT): 5.5 UG/ML
LEVETIRACETAM SERPL-MCNC: 37.9 UG/ML

## 2021-01-14 LAB — URINE CYTOLOGY: NORMAL

## 2021-01-15 ENCOUNTER — APPOINTMENT (OUTPATIENT)
Dept: NEUROLOGY | Facility: CLINIC | Age: 76
End: 2021-01-15
Payer: MEDICARE

## 2021-01-15 VITALS
WEIGHT: 180 LBS | BODY MASS INDEX: 25.2 KG/M2 | HEIGHT: 71 IN | SYSTOLIC BLOOD PRESSURE: 124 MMHG | DIASTOLIC BLOOD PRESSURE: 74 MMHG | HEART RATE: 64 BPM | TEMPERATURE: 97.5 F

## 2021-01-15 PROCEDURE — 99214 OFFICE O/P EST MOD 30 MIN: CPT

## 2021-01-15 RX ORDER — OXCARBAZEPINE 150 MG/1
150 TABLET, FILM COATED ORAL TWICE DAILY
Qty: 60 | Refills: 5 | Status: DISCONTINUED | COMMUNITY
Start: 2017-07-17 | End: 2021-01-15

## 2021-01-15 NOTE — HISTORY OF PRESENT ILLNESS
[FreeTextEntry1] : He is 75-year-old patient with a history of known seizure disorder nausea complex seizures with breakthrough seizures currently on tapering doses of oxcarbazepine stopped using completely but last few weeks currently only taking Keppra thousand milligrams twice a day and Vimpat 100 mg twice a day without any side effects until recently had an episode where he was at a shopping center he was noted walking away with food without  paying which patient is unaware of which he never did in the past.\par He had events in the past very he does not have recollection of events\par In the past he had noted to have events  wandering or fallen or walking out without his knowledge nor recently he is taken off oxcarbazepine few weeks ago. Is unclear if he is having breakthrough seizures.\par Lab work did not reveal any subtherapeutic levels of medications.\par Patient is not aware of his events.\par

## 2021-01-15 NOTE — REASON FOR VISIT
[Follow-Up: _____] : a [unfilled] follow-up visit [FreeTextEntry1] : evaluation for Seizure like event with memory lapse / Seizure dsiorder

## 2021-01-15 NOTE — DISCUSSION/SUMMARY
[FreeTextEntry1] : Patient with partial complex seizures with breakthrough seizures in the past with recent event of episode of memory lapse.\par Rule out recurrent seizures.\par Recommend patient to have EEG.\par Patient is off oxcarbazepine.\par Continue Keppra and Vimpat.\par Followup evaluation after the EEG is done. \par Reevaluation about medications.\par Follow up with you for other medical problems.\par A letter was  given to patient regarding his seizure disorder and susceptibility.\par \par \par

## 2021-01-15 NOTE — PHYSICAL EXAM
[General Appearance - Alert] : alert [General Appearance - In No Acute Distress] : in no acute distress [General Appearance - Well Nourished] : well nourished [General Appearance - Well-Appearing] : healthy appearing [Affect] : the affect was normal [Person] : oriented to person [Place] : oriented to place [Time] : oriented to time [Short Term Intact] : short term memory intact [Remote Intact] : remote memory impaired [Registration Intact] : recent registration memory intact [Concentration Intact] : normal concentrating ability [Visual Intact] : visual attention was ~T not ~L decreased [Naming Objects] : no difficulty naming common objects [Repeating Phrases] : no difficulty repeating a phrase [Writing A Sentence] : no difficulty writing a sentence [Fluency] : fluency intact [Comprehension] : comprehension intact [Reading] : reading intact [Past History] : adequate knowledge of personal past history [Cranial Nerves Optic (II)] : visual acuity intact bilaterally,  visual fields full to confrontation, pupils equal round and reactive to light [Cranial Nerves Oculomotor (III)] : extraocular motion intact [Cranial Nerves Trigeminal (V)] : facial sensation intact symmetrically [Cranial Nerves Facial (VII)] : face symmetrical [Cranial Nerves Vestibulocochlear (VIII)] : hearing was intact bilaterally [Cranial Nerves Glossopharyngeal (IX)] : tongue and palate midline [Cranial Nerves Hypoglossal (XII)] : there was no tongue deviation with protrusion [Cranial Nerves Accessory (XI - Cranial And Spinal)] : head turning and shoulder shrug symmetric [Motor Strength] : muscle strength was normal in all four extremities [No Muscle Atrophy] : normal bulk in all four extremities [Motor Handedness Right-Handed] : the patient is right hand dominant [Sensation Tactile Decrease] : light touch was intact [Romberg's Sign] : a positive Romberg's sign was present [Limited Balance] : the patient's balance was impaired [Past-pointing] : there was no past-pointing [Tremor] : no tremor present [1+] : Patella left 1+ [0] : Ankle jerk left 0 [Plantar Reflex Right Only] : normal on the right [Plantar Reflex Left Only] : normal on the left [FreeTextEntry4] : verbose, recall 3/3 today. names 4/5 [FreeTextEntry8] : unsteady [Sclera] : the sclera and conjunctiva were normal [Outer Ear] : the ears and nose were normal in appearance [Oropharynx] : the oropharynx was normal [Neck Appearance] : the appearance of the neck was normal [Auscultation Breath Sounds / Voice Sounds] : lungs were clear to auscultation bilaterally [Heart Rate And Rhythm] : heart rate was normal and rhythm regular [Heart Sounds] : normal S1 and S2 [Heart Sounds Gallop] : no gallops [Murmurs] : no murmurs [Heart Sounds Pericardial Friction Rub] : no pericardial rub [Full Pulse] : the pedal pulses are present [Edema] : there was no peripheral edema [Bowel Sounds] : normal bowel sounds [Abdomen Soft] : soft [Abdomen Tenderness] : non-tender [Abdomen Mass (___ Cm)] : no abdominal mass palpated [No CVA Tenderness] : no ~M costovertebral angle tenderness [No Spinal Tenderness] : no spinal tenderness [Nail Clubbing] : no clubbing  or cyanosis of the fingernails [Musculoskeletal - Swelling] : no joint swelling seen [Motor Tone] : muscle strength and tone were normal [Skin Color & Pigmentation] : normal skin color and pigmentation [Skin Turgor] : normal skin turgor [] : no rash [FreeTextEntry1] : left flank port wine stain, large. some irritations on abd, arms, excoriation.

## 2021-01-15 NOTE — REVIEW OF SYSTEMS
[Memory Lapses or Loss] : no memory loss [Decr. Concentrating Ability] : decreased concentrating ability [Difficulty with Language] : no ~M difficulty with language [Changed Thought Patterns] : no change in thought patterns [Numbness] : numbness [Tingling] : no tingling [Seizures] : convulsions [As Noted in HPI] : as noted in HPI [Loss Of Hearing] : hearing loss [Negative] : Heme/Lymph [de-identified] : ? break through seizures [FreeTextEntry4] : tinnitus/[place]

## 2021-01-22 ENCOUNTER — APPOINTMENT (OUTPATIENT)
Dept: NEUROLOGY | Facility: CLINIC | Age: 76
End: 2021-01-22
Payer: MEDICARE

## 2021-01-22 PROCEDURE — 95816 EEG AWAKE AND DROWSY: CPT

## 2021-01-29 NOTE — CONSULT NOTE ADULT - MUSCULOSKELETAL
Medical Necessity Clause: This procedure was medically necessary because the lesions that were treated were: Render Post-Care Instructions In Note?: no Consent: The patient's consent was obtained including but not limited to risks of crusting, scabbing, blistering, scarring, darker or lighter pigmentary change, recurrence, incomplete removal and infection. Detail Level: Detailed Medical Necessity Information: It is in your best interest to select a reason for this procedure from the list below. All of these items fulfill various CMS LCD requirements except the new and changing color options. Post-Care Instructions: I reviewed with the patient in detail post-care instructions. Patient is to wear sunprotection, and avoid picking at any of the treated lesions. Pt may apply Vaseline to crusted or scabbing areas. details… detailed exam no calf tenderness/no joint erythema/no joint warmth

## 2021-02-06 NOTE — ED PROVIDER NOTE - SECONDARY DIAGNOSIS.
Rec'd sitting in chair, NAD, A&Ox3 forgetful at times, +IVL, follows commands and agreeable to PT Central cord syndrome

## 2021-03-01 ENCOUNTER — OUTPATIENT (OUTPATIENT)
Dept: OUTPATIENT SERVICES | Facility: HOSPITAL | Age: 76
LOS: 1 days | End: 2021-03-01
Payer: MEDICARE

## 2021-03-01 DIAGNOSIS — Z98.89 OTHER SPECIFIED POSTPROCEDURAL STATES: Chronic | ICD-10-CM

## 2021-03-09 DIAGNOSIS — Z71.89 OTHER SPECIFIED COUNSELING: ICD-10-CM

## 2021-04-13 ENCOUNTER — APPOINTMENT (OUTPATIENT)
Dept: NEUROLOGY | Facility: CLINIC | Age: 76
End: 2021-04-13
Payer: MEDICARE

## 2021-04-13 VITALS
TEMPERATURE: 97.2 F | HEART RATE: 69 BPM | HEIGHT: 71 IN | BODY MASS INDEX: 25.2 KG/M2 | SYSTOLIC BLOOD PRESSURE: 128 MMHG | WEIGHT: 180 LBS | DIASTOLIC BLOOD PRESSURE: 84 MMHG

## 2021-04-13 PROCEDURE — 99214 OFFICE O/P EST MOD 30 MIN: CPT

## 2021-04-13 NOTE — HISTORY OF PRESENT ILLNESS
[FreeTextEntry1] : He is 74 yo pt with Known seizure disorder stable on meds off Oxcarbazepine c/o imbalance and unsteadiness  and uncontrolled BS forgetting meds due to sleep problems recently.\par [place] and difficult to talk to him.\par Currently on Keppra and Vimpat.\par No side effects.\par No lab work from your office .\par

## 2021-04-13 NOTE — REASON FOR VISIT
[Follow-Up: _____] : a [unfilled] follow-up visit [FreeTextEntry1] : Follow up for pt with Seizure disorder and memory lapses and sleep problems/ imbalance

## 2021-04-13 NOTE — PHYSICAL EXAM
[General Appearance - Alert] : alert [General Appearance - In No Acute Distress] : in no acute distress [General Appearance - Well Nourished] : well nourished [General Appearance - Well-Appearing] : healthy appearing [Affect] : the affect was normal [Person] : oriented to person [Place] : oriented to place [Time] : oriented to time [Short Term Intact] : short term memory intact [Remote Intact] : remote memory impaired [Registration Intact] : recent registration memory intact [Concentration Intact] : normal concentrating ability [Visual Intact] : visual attention was ~T not ~L decreased [Naming Objects] : no difficulty naming common objects [Repeating Phrases] : no difficulty repeating a phrase [Writing A Sentence] : no difficulty writing a sentence [Fluency] : fluency intact [Comprehension] : comprehension intact [Reading] : reading intact [Past History] : adequate knowledge of personal past history [Cranial Nerves Optic (II)] : visual acuity intact bilaterally,  visual fields full to confrontation, pupils equal round and reactive to light [Cranial Nerves Oculomotor (III)] : extraocular motion intact [Cranial Nerves Trigeminal (V)] : facial sensation intact symmetrically [Cranial Nerves Facial (VII)] : face symmetrical [Cranial Nerves Vestibulocochlear (VIII)] : hearing was intact bilaterally [Cranial Nerves Glossopharyngeal (IX)] : tongue and palate midline [Cranial Nerves Accessory (XI - Cranial And Spinal)] : head turning and shoulder shrug symmetric [Cranial Nerves Hypoglossal (XII)] : there was no tongue deviation with protrusion [Motor Strength] : muscle strength was normal in all four extremities [No Muscle Atrophy] : normal bulk in all four extremities [Motor Handedness Right-Handed] : the patient is right hand dominant [Sensation Tactile Decrease] : light touch was intact [Romberg's Sign] : a positive Romberg's sign was present [Limited Balance] : the patient's balance was impaired [Past-pointing] : there was no past-pointing [Tremor] : no tremor present [1+] : Patella left 1+ [0] : Ankle jerk left 0 [Plantar Reflex Right Only] : normal on the right [Plantar Reflex Left Only] : normal on the left [FreeTextEntry4] : verbose, recall 3/3 today. names 4/5 [FreeTextEntry8] : unsteady [Sclera] : the sclera and conjunctiva were normal [Outer Ear] : the ears and nose were normal in appearance [Oropharynx] : the oropharynx was normal [Neck Appearance] : the appearance of the neck was normal [Auscultation Breath Sounds / Voice Sounds] : lungs were clear to auscultation bilaterally [Heart Rate And Rhythm] : heart rate was normal and rhythm regular [Heart Sounds] : normal S1 and S2 [Heart Sounds Gallop] : no gallops [Murmurs] : no murmurs [Heart Sounds Pericardial Friction Rub] : no pericardial rub [Full Pulse] : the pedal pulses are present [Edema] : there was no peripheral edema [Bowel Sounds] : normal bowel sounds [Abdomen Tenderness] : non-tender [Abdomen Soft] : soft [Abdomen Mass (___ Cm)] : no abdominal mass palpated [No CVA Tenderness] : no ~M costovertebral angle tenderness [No Spinal Tenderness] : no spinal tenderness [Nail Clubbing] : no clubbing  or cyanosis of the fingernails [Musculoskeletal - Swelling] : no joint swelling seen [Motor Tone] : muscle strength and tone were normal [Skin Turgor] : normal skin turgor [Skin Color & Pigmentation] : normal skin color and pigmentation [] : no rash [FreeTextEntry1] : left flank port wine stain, large. some irritations on abd, arms, excoriation.

## 2021-04-13 NOTE — REVIEW OF SYSTEMS
[Memory Lapses or Loss] : no memory loss [Decr. Concentrating Ability] : decreased concentrating ability [Difficulty with Language] : no ~M difficulty with language [Changed Thought Patterns] : no change in thought patterns [Numbness] : numbness [Tingling] : no tingling [Seizures] : convulsions [As Noted in HPI] : as noted in HPI [Loss Of Hearing] : hearing loss [Negative] : Heme/Lymph [de-identified] : ? break through seizures [FreeTextEntry4] : tinnitus/[place]

## 2021-04-13 NOTE — DISCUSSION/SUMMARY
[FreeTextEntry1] : Pt with Partail complex seizures with gen seizures stable EEG.\par Continue Keppra and Vimpat.\par C/o unsteadiness  underlying Neuropathy\par Sleep problems REC Home sleep study\par May benefit from PT but cant have it due to transport\par Lab work ordred.\par Rx sent\par Follow up in 3 months.\par

## 2021-04-20 LAB
ALBUMIN SERPL ELPH-MCNC: 4.1 G/DL
ALP BLD-CCNC: 63 U/L
ALT SERPL-CCNC: 9 U/L
ANION GAP SERPL CALC-SCNC: 14 MMOL/L
AST SERPL-CCNC: 11 U/L
BASOPHILS # BLD AUTO: 0.04 K/UL
BASOPHILS NFR BLD AUTO: 0.6 %
BILIRUB SERPL-MCNC: 0.5 MG/DL
BUN SERPL-MCNC: 49 MG/DL
CALCIUM SERPL-MCNC: 9 MG/DL
CHLORIDE SERPL-SCNC: 105 MMOL/L
CO2 SERPL-SCNC: 22 MMOL/L
CREAT SERPL-MCNC: 2.3 MG/DL
EOSINOPHIL # BLD AUTO: 0.37 K/UL
EOSINOPHIL NFR BLD AUTO: 5.2 %
ERYTHROCYTE [SEDIMENTATION RATE] IN BLOOD BY WESTERGREN METHOD: 7 MM/HR
FOLATE SERPL-MCNC: 9.2 NG/ML
GLUCOSE SERPL-MCNC: 175 MG/DL
HCT VFR BLD CALC: 38.3 %
HGB BLD-MCNC: 12.9 G/DL
IMM GRANULOCYTES NFR BLD AUTO: 0.6 %
LEVETIRACETAM SERPL-MCNC: 63.8 UG/ML
LYMPHOCYTES # BLD AUTO: 1.1 K/UL
LYMPHOCYTES NFR BLD AUTO: 15.6 %
MAN DIFF?: NORMAL
MCHC RBC-ENTMCNC: 30.9 PG
MCHC RBC-ENTMCNC: 33.7 GM/DL
MCV RBC AUTO: 91.8 FL
MONOCYTES # BLD AUTO: 0.54 K/UL
MONOCYTES NFR BLD AUTO: 7.6 %
NEUTROPHILS # BLD AUTO: 4.97 K/UL
NEUTROPHILS NFR BLD AUTO: 70.4 %
PLATELET # BLD AUTO: 207 K/UL
POTASSIUM SERPL-SCNC: 4.9 MMOL/L
PROT SERPL-MCNC: 6 G/DL
RBC # BLD: 4.17 M/UL
RBC # FLD: 13.8 %
SODIUM SERPL-SCNC: 140 MMOL/L
TSH SERPL-ACNC: 5.51 UIU/ML
VIT B12 SERPL-MCNC: 376 PG/ML
WBC # FLD AUTO: 7.06 K/UL

## 2021-04-22 ENCOUNTER — APPOINTMENT (OUTPATIENT)
Dept: NEUROLOGY | Facility: CLINIC | Age: 76
End: 2021-04-22
Payer: MEDICARE

## 2021-04-22 LAB
DM LACOSAMIDE: 2.6 UG/ML
LACOSAMIDE (VIMPAT): 8.5 UG/ML

## 2021-04-22 PROCEDURE — 95806 SLEEP STUDY UNATT&RESP EFFT: CPT

## 2021-05-18 NOTE — PROGRESS NOTE ADULT - ASSESSMENT
Diane Cordero PT [facility]   5/24/2021 11:30 AM Heath Solitario MD BSMA [facility]   5/25/2021  2:00 PM Zach Johnson PTA [facility]   5/27/2021  2:00 PM Diane Cordero PT [facility]   6/1/2021  2:00 PM Zach Johnson PTA [facility] A/P:  Central cord syndrome  S/P unwitnessed fall, r/o syncope  Hospitalist consult for medical management  Cardiology consult  Anticoagulation consult  Multiple medical comorbidities of HLD, Asthma, Vertebral artery dissection, Sturge-Quinonez syndrome, HTN, Hypothyroidism, Epilepsy, DMII, CKD   Monitor neurochecks  GI/DVT prophylaxis  Pain control  F/U labs  Maintain hard cervical collar at present per neurosurgery  Pt stable and cleared from trauma standpoint  S/P neuro/spine surgical interventoin 8/8/17  Transfer pt to neurosurgical service, agreed by Dr Kim Pitt prn trauma surgical needs  Pt aware of and agrees with all of the above

## 2021-07-02 ENCOUNTER — APPOINTMENT (OUTPATIENT)
Dept: VASCULAR SURGERY | Facility: CLINIC | Age: 76
End: 2021-07-02
Payer: MEDICARE

## 2021-07-02 VITALS — OXYGEN SATURATION: 100 % | DIASTOLIC BLOOD PRESSURE: 75 MMHG | SYSTOLIC BLOOD PRESSURE: 126 MMHG | HEART RATE: 62 BPM

## 2021-07-02 DIAGNOSIS — M79.672 PAIN IN RIGHT FOOT: ICD-10-CM

## 2021-07-02 DIAGNOSIS — M79.673 PAIN IN UNSPECIFIED FOOT: ICD-10-CM

## 2021-07-02 DIAGNOSIS — M79.671 PAIN IN RIGHT FOOT: ICD-10-CM

## 2021-07-02 PROCEDURE — 99203 OFFICE O/P NEW LOW 30 MIN: CPT

## 2021-07-02 PROCEDURE — 93923 UPR/LXTR ART STDY 3+ LVLS: CPT

## 2021-08-12 ENCOUNTER — APPOINTMENT (OUTPATIENT)
Dept: NEUROLOGY | Facility: CLINIC | Age: 76
End: 2021-08-12
Payer: MEDICARE

## 2021-08-12 VITALS
SYSTOLIC BLOOD PRESSURE: 106 MMHG | HEIGHT: 71 IN | WEIGHT: 180 LBS | DIASTOLIC BLOOD PRESSURE: 66 MMHG | TEMPERATURE: 97.8 F | HEART RATE: 68 BPM | BODY MASS INDEX: 25.2 KG/M2

## 2021-08-12 DIAGNOSIS — G40.219 LOCALIZATION-RELATED (FOCAL) (PARTIAL) SYMPTOMATIC EPILEPSY AND EPILEPTIC SYNDROMES WITH COMPLEX PARTIAL SEIZURES, INTRACTABLE, W/OUT STATUS EPILEPTICUS: ICD-10-CM

## 2021-08-12 DIAGNOSIS — R62.50 UNSPECIFIED LACK OF EXPECTED NORMAL PHYSIOLOGICAL DEVELOPMENT IN CHILDHOOD: ICD-10-CM

## 2021-08-12 PROCEDURE — 99213 OFFICE O/P EST LOW 20 MIN: CPT

## 2021-08-12 RX ORDER — MIRABEGRON 50 MG/1
50 TABLET, FILM COATED, EXTENDED RELEASE ORAL DAILY
Qty: 7 | Refills: 0 | Status: DISCONTINUED | OUTPATIENT
Start: 2020-12-23 | End: 2021-08-12

## 2021-08-12 RX ORDER — BETHANECHOL CHLORIDE 50 MG/1
50 TABLET ORAL
Qty: 60 | Refills: 4 | Status: DISCONTINUED | COMMUNITY
Start: 2018-09-24 | End: 2021-08-12

## 2021-08-12 RX ORDER — ENALAPRIL MALEATE 5 MG/1
5 TABLET ORAL
Refills: 0 | Status: DISCONTINUED | COMMUNITY
End: 2021-08-12

## 2021-08-12 RX ORDER — OXYBUTYNIN CHLORIDE 5 MG/1
5 TABLET ORAL
Qty: 30 | Refills: 4 | Status: DISCONTINUED | COMMUNITY
Start: 2018-09-10 | End: 2021-08-12

## 2021-08-12 NOTE — DISCUSSION/SUMMARY
[FreeTextEntry1] : Partial complex seizures with generalized seizures stable neurologically.\par Complaint about sleep problems home sleep study did not reveal any acute pathology.\par Persistent dizziness.\par Recommend patient to continue Vimpat.\par Reduce Keppra to 500 mg in the morning one thousand milligrams in the evening.\par Will refer patient to begin lab sleep study with sleep evaluation by pulmonology.\par Followup evaluation in 3 months or as needed.\par Get copies of medical records from nephrologist.\par Followup with you for other medical problems.

## 2021-08-12 NOTE — REASON FOR VISIT
[Follow-Up: _____] : a [unfilled] follow-up visit [FreeTextEntry1] : Follow up for Seizure disorder and c/o dizziness on and off / Insomnia excessive daytime sleepiness

## 2021-08-12 NOTE — REVIEW OF SYSTEMS
[Sleep Disturbances] : sleep disturbances [Memory Lapses or Loss] : no memory loss [Decr. Concentrating Ability] : decreased concentrating ability [Difficulty with Language] : no ~M difficulty with language [Changed Thought Patterns] : no change in thought patterns [Numbness] : numbness [Tingling] : no tingling [Seizures] : convulsions [As Noted in HPI] : as noted in HPI [Loss Of Hearing] : hearing loss [Negative] : Heme/Lymph [FreeTextEntry4] : tinnitus/ [place]

## 2021-08-12 NOTE — HISTORY OF PRESENT ILLNESS
[FreeTextEntry1] : He is 76-year-old patient coming here for followup evaluation for known seizure disorder with breakthrough seizures currently on Keppra and the Vimpat complaining about sleep problems excessive daytime sleepiness and unable to do sleep nighttime had home sleep study done since last visit did not reveal any acute problems.\par Recommended in lab sleep study if needed.\par Coming here for followup evaluation. Last labwork revealed elevated Keppra levels. Patient denies of any side effects from the medications. Recently seen by nephrologist no reports available. Was started on new medication does not know why .\par Denies of any headaches, focal weakness, changes neurologically except for sleep problems and dizziness and lightheadedness.

## 2021-10-01 PROCEDURE — G9005: CPT

## 2021-10-21 ENCOUNTER — RX RENEWAL (OUTPATIENT)
Age: 76
End: 2021-10-21

## 2021-11-12 ENCOUNTER — APPOINTMENT (OUTPATIENT)
Dept: NEUROLOGY | Facility: CLINIC | Age: 76
End: 2021-11-12
Payer: MEDICARE

## 2021-11-12 VITALS
WEIGHT: 185 LBS | BODY MASS INDEX: 25.9 KG/M2 | SYSTOLIC BLOOD PRESSURE: 113 MMHG | HEIGHT: 71 IN | TEMPERATURE: 97.8 F | DIASTOLIC BLOOD PRESSURE: 68 MMHG | HEART RATE: 64 BPM

## 2021-11-12 DIAGNOSIS — R26.81 UNSTEADINESS ON FEET: ICD-10-CM

## 2021-11-12 PROCEDURE — 99214 OFFICE O/P EST MOD 30 MIN: CPT

## 2021-11-12 NOTE — HISTORY OF PRESENT ILLNESS
[FreeTextEntry1] : He is 76-year-old patient coming here for followup evaluation for known seizure disorder with breakthrough seizures and mild cognitive impairment with memory loss currently taking Keppra 500 mg in the morning and thousand milligrams at bedtime and Vimpat 100 mg twice a day completely discontinued oxcarbazepine stable neurologically no breakthrough seizures except episodes of confusion and memory loss.\par Symptoms of sleep apnea and night terrors still persisting waiting for a sleep study which is not done yet. Schedule for the test in January at [facility]. Patient denies of any new complaints since last visit. Repeat lab work is  pending. No recurrent seizures clinically. No other new complaints since last visit.\par No recent lab work available from your office.

## 2021-11-12 NOTE — REASON FOR VISIT
[Follow-Up: _____] : a [unfilled] follow-up visit [FreeTextEntry1] : Follow up for Pt with Seizure disorder and Memory loss

## 2021-11-12 NOTE — REVIEW OF SYSTEMS
[Sleep Disturbances] : sleep disturbances [Memory Lapses or Loss] : no memory loss [Decr. Concentrating Ability] : decreased concentrating ability [Difficulty with Language] : no ~M difficulty with language [Changed Thought Patterns] : no change in thought patterns [Numbness] : numbness [Tingling] : no tingling [Seizures] : convulsions [As Noted in HPI] : as noted in HPI [Loss Of Hearing] : hearing loss [Negative] : Heme/Lymph [FreeTextEntry4] : tinnitus/ [place]

## 2021-11-12 NOTE — DISCUSSION/SUMMARY
[FreeTextEntry1] : He is 76-year-old patient with a history of partial complex seizures with generalization stable neurologically.\par Underlying cognitive impairment with memory loss.\par Sleep disorder with sleep apnea and REM sleep disorder  to be ruled out.\par Sleep study pending.\par Recommend lab work including levels.\par Followup evaluation in 3 months or as needed.\par Followup with you for other medical problems.\par Get copies of lab work from your office.\par Patient education provided regarding his condition and medical problems.

## 2021-11-29 RX ORDER — MIRABEGRON 25 MG/1
25 TABLET, FILM COATED, EXTENDED RELEASE ORAL
Qty: 90 | Refills: 2 | Status: COMPLETED | COMMUNITY
Start: 2020-12-23 | End: 2021-11-29

## 2021-11-29 RX ORDER — FESOTERODINE FUMARATE 4 MG/1
4 TABLET, FILM COATED, EXTENDED RELEASE ORAL
Qty: 7 | Refills: 0 | Status: DISCONTINUED | OUTPATIENT
Start: 2018-09-10 | End: 2021-11-29

## 2021-12-14 LAB
ALBUMIN SERPL ELPH-MCNC: 4.3 G/DL
ALP BLD-CCNC: 70 U/L
ALT SERPL-CCNC: 9 U/L
ANION GAP SERPL CALC-SCNC: 15 MMOL/L
AST SERPL-CCNC: 15 U/L
BASOPHILS # BLD AUTO: 0.06 K/UL
BASOPHILS NFR BLD AUTO: 0.7 %
BILIRUB SERPL-MCNC: 0.3 MG/DL
BUN SERPL-MCNC: 61 MG/DL
CALCIUM SERPL-MCNC: 9.3 MG/DL
CHLORIDE SERPL-SCNC: 103 MMOL/L
CO2 SERPL-SCNC: 21 MMOL/L
CREAT SERPL-MCNC: 3.1 MG/DL
DM LACOSAMIDE: 3.9 UG/ML
EOSINOPHIL # BLD AUTO: 0.13 K/UL
EOSINOPHIL NFR BLD AUTO: 1.5 %
GLUCOSE SERPL-MCNC: 155 MG/DL
HCT VFR BLD CALC: 34.7 %
HGB BLD-MCNC: 12.1 G/DL
IMM GRANULOCYTES NFR BLD AUTO: 0.8 %
LACOSAMIDE (VIMPAT): 9.7 UG/ML
LEVETIRACETAM SERPL-MCNC: 46.2 UG/ML
LYMPHOCYTES # BLD AUTO: 1.15 K/UL
LYMPHOCYTES NFR BLD AUTO: 13.6 %
MAN DIFF?: NORMAL
MCHC RBC-ENTMCNC: 30.9 PG
MCHC RBC-ENTMCNC: 34.9 GM/DL
MCV RBC AUTO: 88.5 FL
MONOCYTES # BLD AUTO: 0.74 K/UL
MONOCYTES NFR BLD AUTO: 8.8 %
NEUTROPHILS # BLD AUTO: 6.29 K/UL
NEUTROPHILS NFR BLD AUTO: 74.6 %
PLATELET # BLD AUTO: 237 K/UL
POTASSIUM SERPL-SCNC: 4.6 MMOL/L
PROT SERPL-MCNC: 6.5 G/DL
RBC # BLD: 3.92 M/UL
RBC # FLD: 13.3 %
SODIUM SERPL-SCNC: 139 MMOL/L
WBC # FLD AUTO: 8.44 K/UL

## 2021-12-27 ENCOUNTER — RX RENEWAL (OUTPATIENT)
Age: 76
End: 2021-12-27

## 2022-01-20 ENCOUNTER — APPOINTMENT (OUTPATIENT)
Dept: RADIOLOGY | Facility: CLINIC | Age: 77
End: 2022-01-20
Payer: MEDICARE

## 2022-01-20 ENCOUNTER — APPOINTMENT (OUTPATIENT)
Dept: NEUROSURGERY | Facility: CLINIC | Age: 77
End: 2022-01-20
Payer: MEDICARE

## 2022-01-20 ENCOUNTER — OUTPATIENT (OUTPATIENT)
Dept: OUTPATIENT SERVICES | Facility: HOSPITAL | Age: 77
LOS: 1 days | End: 2022-01-20
Payer: MEDICARE

## 2022-01-20 VITALS
DIASTOLIC BLOOD PRESSURE: 84 MMHG | SYSTOLIC BLOOD PRESSURE: 155 MMHG | WEIGHT: 185 LBS | HEART RATE: 64 BPM | OXYGEN SATURATION: 99 % | BODY MASS INDEX: 25.9 KG/M2 | HEIGHT: 71 IN | TEMPERATURE: 96 F

## 2022-01-20 DIAGNOSIS — Z98.89 OTHER SPECIFIED POSTPROCEDURAL STATES: Chronic | ICD-10-CM

## 2022-01-20 DIAGNOSIS — M54.2 CERVICALGIA: ICD-10-CM

## 2022-01-20 DIAGNOSIS — R29.898 OTHER SYMPTOMS AND SIGNS INVOLVING THE MUSCULOSKELETAL SYSTEM: ICD-10-CM

## 2022-01-20 DIAGNOSIS — R26.2 DIFFICULTY IN WALKING, NOT ELSEWHERE CLASSIFIED: ICD-10-CM

## 2022-01-20 DIAGNOSIS — M54.50 LOW BACK PAIN, UNSPECIFIED: ICD-10-CM

## 2022-01-20 PROCEDURE — 72052 X-RAY EXAM NECK SPINE 6/>VWS: CPT

## 2022-01-20 PROCEDURE — 72052 X-RAY EXAM NECK SPINE 6/>VWS: CPT | Mod: 26

## 2022-01-20 PROCEDURE — 72110 X-RAY EXAM L-2 SPINE 4/>VWS: CPT | Mod: 26

## 2022-01-20 PROCEDURE — 72110 X-RAY EXAM L-2 SPINE 4/>VWS: CPT

## 2022-01-20 PROCEDURE — 99203 OFFICE O/P NEW LOW 30 MIN: CPT

## 2022-01-20 NOTE — REVIEW OF SYSTEMS
[Vertigo] : vertigo [Migraine Headache] : migraine headaches [Chest Pain] : chest pain [Palpitations] : palpitations [Shortness Of Breath] : shortness of breath [Abdominal Pain] : abdominal pain [Joint Pain] : joint pain [Negative] : Heme/Lymph [de-identified] : Fatigue [FreeTextEntry3] : Blurry vision and intolerance to light. [FreeTextEntry6] : Sputum production [FreeTextEntry7] : Gases [FreeTextEntry9] : Muscle pain

## 2022-01-20 NOTE — REVIEW OF SYSTEMS
[Vertigo] : vertigo [Migraine Headache] : migraine headaches [Chest Pain] : chest pain [Palpitations] : palpitations [Shortness Of Breath] : shortness of breath [Abdominal Pain] : abdominal pain [Joint Pain] : joint pain [Negative] : Heme/Lymph [de-identified] : Fatigue [FreeTextEntry3] : Blurry vision and intolerance to light. [FreeTextEntry6] : Sputum production [FreeTextEntry7] : Gases [FreeTextEntry9] : Muscle pain

## 2022-01-21 ENCOUNTER — APPOINTMENT (OUTPATIENT)
Dept: MRI IMAGING | Facility: CLINIC | Age: 77
End: 2022-01-21
Payer: MEDICARE

## 2022-01-21 ENCOUNTER — OUTPATIENT (OUTPATIENT)
Dept: OUTPATIENT SERVICES | Facility: HOSPITAL | Age: 77
LOS: 1 days | End: 2022-01-21
Payer: MEDICARE

## 2022-01-21 ENCOUNTER — APPOINTMENT (OUTPATIENT)
Dept: UROLOGY | Facility: CLINIC | Age: 77
End: 2022-01-21
Payer: MEDICARE

## 2022-01-21 DIAGNOSIS — M54.50 LOW BACK PAIN, UNSPECIFIED: ICD-10-CM

## 2022-01-21 DIAGNOSIS — Z98.89 OTHER SPECIFIED POSTPROCEDURAL STATES: Chronic | ICD-10-CM

## 2022-01-21 PROCEDURE — 72141 MRI NECK SPINE W/O DYE: CPT | Mod: 26,MG

## 2022-01-21 PROCEDURE — G1004: CPT

## 2022-01-21 PROCEDURE — 99213 OFFICE O/P EST LOW 20 MIN: CPT

## 2022-01-21 PROCEDURE — 72141 MRI NECK SPINE W/O DYE: CPT | Mod: MG

## 2022-01-21 NOTE — HISTORY OF PRESENT ILLNESS
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  He has been on Myrbetriq on a regular basis.

## 2022-01-22 NOTE — ED STATDOCS - NSTIMEPROVIDERCAREINITIATE_GEN_ER
Pt presented for Pre-procedure drive thru testing. Patient identified using two patient identifiers prior to specimen collection. Questions answered prior to departure and education given.      17-Mar-2018 16:15

## 2022-01-28 ENCOUNTER — LABORATORY RESULT (OUTPATIENT)
Age: 77
End: 2022-01-28

## 2022-01-30 LAB
APPEARANCE: CLEAR
BILIRUBIN URINE: NEGATIVE
BLOOD URINE: NEGATIVE
COLOR: NORMAL
GLUCOSE QUALITATIVE U: NEGATIVE
KETONES URINE: NEGATIVE
LEUKOCYTE ESTERASE URINE: ABNORMAL
NITRITE URINE: NEGATIVE
PH URINE: 6
PROTEIN URINE: NORMAL
PSA SERPL-MCNC: 1 NG/ML
SPECIFIC GRAVITY URINE: 1.01
UROBILINOGEN URINE: NORMAL

## 2022-01-31 LAB — URINE CYTOLOGY: NORMAL

## 2022-02-09 ENCOUNTER — APPOINTMENT (OUTPATIENT)
Dept: NEUROSURGERY | Facility: CLINIC | Age: 77
End: 2022-02-09
Payer: MEDICARE

## 2022-02-09 VITALS
WEIGHT: 185 LBS | DIASTOLIC BLOOD PRESSURE: 75 MMHG | BODY MASS INDEX: 25.9 KG/M2 | SYSTOLIC BLOOD PRESSURE: 123 MMHG | TEMPERATURE: 96.7 F | HEIGHT: 71 IN | HEART RATE: 55 BPM | OXYGEN SATURATION: 99 %

## 2022-02-09 DIAGNOSIS — M25.512 PAIN IN LEFT SHOULDER: ICD-10-CM

## 2022-02-09 PROCEDURE — 99215 OFFICE O/P EST HI 40 MIN: CPT

## 2022-02-09 NOTE — CONSULT LETTER
[Dear  ___] : Dear  [unfilled], [Courtesy Letter:] : I had the pleasure of seeing your patient, [unfilled], in my office today. [Sincerely,] : Sincerely, [FreeTextEntry2] : Marlon Bazan MD [address] [place] [address] [FreeTextEntry1] : Mr. Amato is a very pleasant 76-year-old male patient who was seen in our office in regards to left-sided shoulder pain, neck pain, and chronic myelopathy.\par \par The patient previously underwent an anterior cervical decompression from C4-6 back in 2017.  The patient presented initially with severe myelopathy and central cord syndrome and still has residual deficits.  The patient has improved significantly since his presentation to the emergency department and currently continues to experience numbness in his hands, clumsiness in his hands, and mild ataxia.  The patient's primary concern today is "clicking in the neck" as well as left shoulder pain.   the patient states that his hand numbness has been relatively stable as well as his clumsiness.  The patient has developed several additional medical issues since we last met and is currently being investigated for chest pain, shortness of breath, and angina.\par \par On examination, the patient is alert, oriented, and compliant with the exam.  The patient demonstrates 5/5 strength in the upper extremities bilaterally, but has stiffness and contractures secondary to his known myelopathy.  This is most noticeable in the hand intrinsics.  Additionally, the patient has significant loss of range of motion on the left shoulder secondary to pain.  The patient's right shoulder range of motion is also limited to a lesser degree but not because of pain.  The patient does not have a Fermin sign or ankle clonus.  The patient cannot tandem walk.  The patient is a normal gait is slightly wide-based but maintains a good hillary.  The patient has 3+ reflexes at the patellar tendons bilaterally and 1+ reflexes Achilles tendons bilaterally.  The patient's upper extremities demonstrate 2+ reflexes.\par \par The patient is accompanied with a cervical MRI scan dated February 22, 2022. 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 Central and bilateral foraminal stenosis is noted at this level. The patient remains adequately decompressed across his old fusion across C4-6. No dynamic instability is noted on cervical spine xrays performed on January 20, 2022. A lumbar spine xray performed the same day demonstrates degenerative changes without dynamic instability. 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  I further explained to the patient that, should he develop progressive neurologic symptoms, he would be a candidate for surgical intervention to decompress this region.  At this time, the patient maintains that his neurologic status has been stable despite developing other symptoms over the years including postural dizziness, increased somnolence, shortness of breath, and chest pain.  Given evidence of radiographic worsening, I recommended routine follow-up in a few months to reevaluate his clinical status for the development of progressive myelopathy and have requested that the patient remain vigilant for worsening symptoms.  In the interim, I have provided the patient a referral for an orthopedic surgeon for evaluation of his left shoulder. [FreeTextEntry3] : Roberto Alvarez MD, PhD, FRCPSC \par Attending Neurosurgeon \par [facility] \par [address], 2nd floor \[place] [address] \par Office: [phone] \par Fax: [phone]\par

## 2022-02-09 NOTE — CONSULT LETTER
[FreeTextEntry1] : Vini Amato is a 76-year-old male who presents today with cervical and lumbar symptoms.  As you may recall on August 8, 2017 patient underwent a C4/5 and C5/6 ACDF.  Patient indicates intermittent aching stiff and pressure-like neck pain radiating to bilateral shoulders.  He reports bilateral upper extremity pain and weakness, left side greater than right.  He reports dropping of objects from bilateral hands, right side greater than left.  He reports difficulties with dexterity.  He reports paresthesias to bilateral hands, right side greater than left.  Unable to recall exact distribution of pain and paresthesias.  Patient reports he has been having difficulties with ambulating for the past year.  He reports feeling off balance.  Patient also reports 1 week history of a deep sharp lower back pain.  He denies any lower extremity pain or weakness.  He reports bilateral foot paresthesias.  Patient with history of diabetes.  He denies saddle anesthesia.  Patient reports long history of urinary incontinence.  Patient is currently under the care of a urologist.  There is no new imaging to review with the patient.  Patient is alert and oriented.  No distress noted.  Hard of hearing.  Strength to bilateral upper and lower extremities 5/5.  Reflexes to upper and lower extremity present and equal.  Negative Fermin's.  Negative clonus. patient walking with a wide based gait.   I provided the patient with a prescription for an x-ray of the cervical and lumbar spine.  I have also provided patient with a prescription for an MRI of the cervical spine.  We will follow-up in office to review imaging.  Patient is aware to call with any further questions or concerns or with any new or worsening symptoms. [Dear  ___] : Dear  [unfilled], [Courtesy Letter:] : I had the pleasure of seeing your patient, [unfilled], in my office today. [Sincerely,] : Sincerely, [FreeTextEntry2] : Marlon Bazan MD [address] [place] [address] [FreeTextEntry3] : Roberto Alvarez MD, PhD, FRCPSC \par Attending Neurosurgeon \par [facility] \par [address] [facility] [facility], 2nd floor \[place] [address] \par Office: [phone] \par Fax: [phone]\par

## 2022-02-09 NOTE — CONSULT LETTER
[FreeTextEntry1] : Vini Amato is a 76-year-old male who presents today with cervical and lumbar symptoms.  As you may recall on August 8, 2017 patient underwent a C4/5 and C5/6 ACDF.  Patient indicates intermittent aching stiff and pressure-like neck pain radiating to bilateral shoulders.  He reports bilateral upper extremity pain and weakness, left side greater than right.  He reports dropping of objects from bilateral hands, right side greater than left.  He reports difficulties with dexterity.  He reports paresthesias to bilateral hands, right side greater than left.  Unable to recall exact distribution of pain and paresthesias.  Patient reports he has been having difficulties with ambulating for the past year.  He reports feeling off balance.  Patient also reports 1 week history of a deep sharp lower back pain.  He denies any lower extremity pain or weakness.  He reports bilateral foot paresthesias.  Patient with history of diabetes.  He denies saddle anesthesia.  Patient reports long history of urinary incontinence.  Patient is currently under the care of a urologist.  There is no new imaging to review with the patient.  Patient is alert and oriented.  No distress noted.  Hard of hearing.  Strength to bilateral upper and lower extremities 5/5.  Reflexes to upper and lower extremity present and equal.  Negative Fermin's.  Negative clonus. patient walking with a wide based gait.   I provided the patient with a prescription for an x-ray of the cervical and lumbar spine.  I have also provided patient with a prescription for an MRI of the cervical spine.  We will follow-up in office to review imaging.  Patient is aware to call with any further questions or concerns or with any new or worsening symptoms. [Dear  ___] : Dear  [unfilled], [Courtesy Letter:] : I had the pleasure of seeing your patient, [unfilled], in my office today. [Sincerely,] : Sincerely, [FreeTextEntry2] : Marlon Bazan MD [address] [place] [address] [FreeTextEntry3] : Roberto Alvarez MD, PhD, FRCPSC \par Attending Neurosurgeon \par [facility] \par [address] [facility], 2nd floor \[place] [address] \par Office: [phone] \par Fax: [phone]\par

## 2022-02-17 ENCOUNTER — APPOINTMENT (OUTPATIENT)
Dept: NEUROLOGY | Facility: CLINIC | Age: 77
End: 2022-02-17
Payer: MEDICARE

## 2022-02-17 VITALS
SYSTOLIC BLOOD PRESSURE: 104 MMHG | DIASTOLIC BLOOD PRESSURE: 63 MMHG | BODY MASS INDEX: 26.19 KG/M2 | TEMPERATURE: 97.8 F | WEIGHT: 185 LBS | HEIGHT: 70.5 IN | HEART RATE: 60 BPM

## 2022-02-17 DIAGNOSIS — G31.84 MILD COGNITIVE IMPAIRMENT, SO STATED: ICD-10-CM

## 2022-02-17 DIAGNOSIS — I77.74 DISSECTION OF VERTEBRAL ARTERY: ICD-10-CM

## 2022-02-17 DIAGNOSIS — G40.919 EPILEPSY, UNSPECIFIED, INTRACTABLE, W/OUT STATUS EPILEPTICUS: ICD-10-CM

## 2022-02-17 PROCEDURE — 99214 OFFICE O/P EST MOD 30 MIN: CPT

## 2022-02-17 NOTE — DISCUSSION/SUMMARY
[FreeTextEntry1] : He is 76-year-old patient with partial complex seizures with generalization stable on the medications with underlying mild cognitive impairment.\par History of peripheral neuropathy with gait imbalance.\par Sleep disorder underwent sleep study no reports available.\par For  cardiac problems being followed by cardiologist.\par Recommend continuing Keppra and Vimpat same doses.\par Followup evaluation in 3 months or as needed.\par Followup with Dr. Alvarez for back and neck problems.\par Return for reevaluation as recommended.\par Will follow up with sleep study results.\par Followup with you for other medical problems.

## 2022-02-17 NOTE — PHYSICAL EXAM
[General Appearance - In No Acute Distress] : in no acute distress [General Appearance - Alert] : alert [General Appearance - Well Nourished] : well nourished [General Appearance - Well-Appearing] : healthy appearing [Affect] : the affect was normal [Person] : oriented to person [Place] : oriented to place [Time] : oriented to time [Remote Intact] : remote memory impaired [Short Term Intact] : short term memory intact [Registration Intact] : recent registration memory intact [Concentration Intact] : normal concentrating ability [Visual Intact] : visual attention was ~T not ~L decreased [Naming Objects] : no difficulty naming common objects [Repeating Phrases] : no difficulty repeating a phrase [Writing A Sentence] : no difficulty writing a sentence [Fluency] : fluency intact [Comprehension] : comprehension intact [Reading] : reading intact [Past History] : adequate knowledge of personal past history [Cranial Nerves Optic (II)] : visual acuity intact bilaterally,  visual fields full to confrontation, pupils equal round and reactive to light [Cranial Nerves Oculomotor (III)] : extraocular motion intact [Cranial Nerves Trigeminal (V)] : facial sensation intact symmetrically [Cranial Nerves Facial (VII)] : face symmetrical [Cranial Nerves Vestibulocochlear (VIII)] : hearing was intact bilaterally [Cranial Nerves Glossopharyngeal (IX)] : tongue and palate midline [Cranial Nerves Accessory (XI - Cranial And Spinal)] : head turning and shoulder shrug symmetric [Cranial Nerves Hypoglossal (XII)] : there was no tongue deviation with protrusion [Motor Strength] : muscle strength was normal in all four extremities [No Muscle Atrophy] : normal bulk in all four extremities [Motor Handedness Right-Handed] : the patient is right hand dominant [Sensation Tactile Decrease] : light touch was intact [Romberg's Sign] : a positive Romberg's sign was present [Limited Balance] : the patient's balance was impaired [Past-pointing] : there was no past-pointing [Tremor] : no tremor present [1+] : Patella left 1+ [0] : Ankle jerk left 0 [Plantar Reflex Right Only] : normal on the right [Plantar Reflex Left Only] : normal on the left [FreeTextEntry4] : verbose, recall 3/3 today. names 4/5 [FreeTextEntry8] : unsteady [Sclera] : the sclera and conjunctiva were normal [Outer Ear] : the ears and nose were normal in appearance [Oropharynx] : the oropharynx was normal [Neck Appearance] : the appearance of the neck was normal [Auscultation Breath Sounds / Voice Sounds] : lungs were clear to auscultation bilaterally [Heart Rate And Rhythm] : heart rate was normal and rhythm regular [Heart Sounds] : normal S1 and S2 [Heart Sounds Gallop] : no gallops [Murmurs] : no murmurs [Heart Sounds Pericardial Friction Rub] : no pericardial rub [Full Pulse] : the pedal pulses are present [Edema] : there was no peripheral edema [Bowel Sounds] : normal bowel sounds [Abdomen Soft] : soft [Abdomen Tenderness] : non-tender [Abdomen Mass (___ Cm)] : no abdominal mass palpated [No CVA Tenderness] : no ~M costovertebral angle tenderness [No Spinal Tenderness] : no spinal tenderness [Nail Clubbing] : no clubbing  or cyanosis of the fingernails [Musculoskeletal - Swelling] : no joint swelling seen [Motor Tone] : muscle strength and tone were normal [Skin Color & Pigmentation] : normal skin color and pigmentation [Skin Turgor] : normal skin turgor [] : no rash [FreeTextEntry1] : left flank port wine stain, large. some irritations on abd, arms, excoriation.

## 2022-02-17 NOTE — REVIEW OF SYSTEMS
[Sleep Disturbances] : sleep disturbances [Memory Lapses or Loss] : no memory loss [Decr. Concentrating Ability] : decreased concentrating ability [Difficulty with Language] : no ~M difficulty with language [Changed Thought Patterns] : no change in thought patterns [Numbness] : numbness [Tingling] : no tingling [Seizures] : convulsions [As Noted in HPI] : as noted in HPI [Loss Of Hearing] : hearing loss [Negative] : Heme/Lymph [FreeTextEntry4] : tinnitus/ Venetie IRA

## 2022-02-17 NOTE — HISTORY OF PRESENT ILLNESS
[FreeTextEntry1] : He is 76-year-old patient coming here for followup evaluation for known seizure disorder with breakthrough seizures with mild cognitive impairment and gait imbalance with unsteadiness stable neurologically currently taking Vimpat 100 mg twice a day and Keppra 500 mg in the morning 1000 mg at bedtime without any seizures.\par Recently seen by neurosurgeon Dr. Alvarez for cervical and back problems recommended exercises. Had sleep study done again at  [facility] 2 months ago no reports available.\par Denies of any other new complaints except an episode of chest pain radiating into  left upper extremity seen by cardiology 2 weeks ago prior to the event did not go to hospital for evaluation.\par

## 2022-04-12 ENCOUNTER — APPOINTMENT (OUTPATIENT)
Dept: NEUROSURGERY | Facility: CLINIC | Age: 77
End: 2022-04-12
Payer: MEDICARE

## 2022-04-12 PROCEDURE — 99214 OFFICE O/P EST MOD 30 MIN: CPT

## 2022-04-13 NOTE — CONSULT LETTER
[Dear  ___] : Dear  [unfilled], [Courtesy Letter:] : I had the pleasure of seeing your patient, [unfilled], in my office today. [Sincerely,] : Sincerely, [FreeTextEntry2] : Marlon Bazan MD\par [address] [place] [address]\[place] [address]  [FreeTextEntry1] : Mr. Amato is a very pleasant 76-year-old male patient who was seen in our office today in follow-up in regards to left-sided shoulder pain, neck pain, and a chronic myelopathy.\par \par The patient previously underwent an urgent anterior cervical decompression and fusion from C4-6 in 2017 for severe myelopathy and central cord syndrome.  The patient still has residual deficits from this injury including mild ataxia, clumsiness in the hands, and numbness in the hands.  The patient states that these issues may have marginally worsened in the last 5 years but not significantly so.  The patient's major concern today is a "clicking" in the neck with associated left-sided shoulder pain.  The patient also endorses a 3/10 severity low back pain which does not radiate.  At our previous visit, the patient was being investigated for chest pain, shortness of breath, and angina.  The patient ultimately had a cardiac stent placed approximately a month ago.\par \par On examination, the patient is alert, oriented, and compliant with the exam.  The patient continues to demonstrate 5/5 strength in the upper extremities bilaterally but continues to have stiffness and contractures in the hands primarily secondary to his myelopathy.  The patient's left-sided shoulder pain has improved somewhat and the patient has significantly improved range of motion at this time.  The patient's gait is slightly wide-based and the patient cannot tandem walk.  The patient does not have a Fermin sign in either hand or ankle clonus.  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  These findings appear stable compared to his previous images from February 22, 2018.  The patient additionally demonstrates evidence of myelomalacia behind the vertebral body of C5 from his previous injury on follow-up MRI scans.  The patient is additionally accompanied with x-rays of the cervical spine including dynamic views performed on January 20, 2022.  These images do not demonstrate any evidence of dynamic instability.\par \par Taken together, the patient has a clinical history and radiographic findings most consistent with musculoskeletal neck pain on the left with possible shoulder involvement.  At this time, I have recommended continuing physical therapy and/or exercise therapy for this pain.  I reassured the patient that the "clicking" sensations the patient experiences are not a result of loosening hardware or any hardware failure.  I have offered the patient a shoulder evaluation with orthopedic surgeon but the patient would like to defer this for now.  Given that the patient states that he has noticed marginal worsening of his clumsiness in the hands and the feet without new objective findings, I have recommended a follow-up MRI scan in approximately 6 months of the cervical spine with an accompanied physical examination.  The patient is aware that he may contact our office anytime sooner should the need arise. [FreeTextEntry3] : Roberto Alvarez MD, PhD, CS, FAANS Attending Neurosurgeon  of Neurosurgery [facility] at [facility] at [address], [place] [address] Office: [phone] Fax: [phone]

## 2022-04-20 ENCOUNTER — APPOINTMENT (OUTPATIENT)
Dept: GASTROENTEROLOGY | Facility: CLINIC | Age: 77
End: 2022-04-20
Payer: MEDICARE

## 2022-04-20 VITALS
DIASTOLIC BLOOD PRESSURE: 80 MMHG | SYSTOLIC BLOOD PRESSURE: 106 MMHG | BODY MASS INDEX: 26.77 KG/M2 | HEIGHT: 70 IN | WEIGHT: 187 LBS | HEART RATE: 63 BPM

## 2022-04-20 DIAGNOSIS — Z86.010 PERSONAL HISTORY OF COLONIC POLYPS: ICD-10-CM

## 2022-04-20 PROCEDURE — 99212 OFFICE O/P EST SF 10 MIN: CPT

## 2022-04-20 NOTE — HISTORY OF PRESENT ILLNESS
[de-identified] : Mr. WHITLEY CHEEMA is a 76 year old male with personal history of colon polyps. Patient had last colonoscopy in 2018. Patient has no complaints of abdominal pain. Patient has noted rare blood on toilet paper. Patient underwent coronary stent six weeks ago and is currently taking clopidogrel. No complaints of heartburn\par

## 2022-04-20 NOTE — ASSESSMENT
[FreeTextEntry1] : 75 yo male with personal history of colon polyps. Patient is s/p recent stent placement, and cannot stop clopidogrel. Will defer colonoscopy for one year and reconsider colonoscopy at that time.

## 2022-05-19 ENCOUNTER — APPOINTMENT (OUTPATIENT)
Dept: NEUROLOGY | Facility: CLINIC | Age: 77
End: 2022-05-19
Payer: MEDICARE

## 2022-05-19 VITALS
BODY MASS INDEX: 27.2 KG/M2 | HEART RATE: 60 BPM | DIASTOLIC BLOOD PRESSURE: 71 MMHG | WEIGHT: 190 LBS | SYSTOLIC BLOOD PRESSURE: 148 MMHG | HEIGHT: 70 IN | TEMPERATURE: 97.8 F

## 2022-05-19 DIAGNOSIS — E53.8 DEFICIENCY OF OTHER SPECIFIED B GROUP VITAMINS: ICD-10-CM

## 2022-05-19 PROCEDURE — 99215 OFFICE O/P EST HI 40 MIN: CPT

## 2022-05-19 RX ORDER — PATIROMER 16.8 G/1
16.8 POWDER, FOR SUSPENSION ORAL
Refills: 0 | Status: DISCONTINUED | COMMUNITY
End: 2022-05-19

## 2022-05-19 RX ORDER — AMLODIPINE BESYLATE 5 MG/1
5 TABLET ORAL
Refills: 0 | Status: DISCONTINUED | COMMUNITY
End: 2022-05-19

## 2022-05-19 RX ORDER — RANOLAZINE 500 MG/1
500 TABLET, EXTENDED RELEASE ORAL
Qty: 60 | Refills: 0 | Status: DISCONTINUED | COMMUNITY
Start: 2020-12-10 | End: 2022-05-19

## 2022-05-19 RX ORDER — LEVETIRACETAM 500 MG/1
500 TABLET, FILM COATED ORAL
Qty: 270 | Refills: 1 | Status: DISCONTINUED | COMMUNITY
Start: 2018-01-30 | End: 2022-05-19

## 2022-05-19 RX ORDER — ATORVASTATIN CALCIUM 20 MG/1
20 TABLET, FILM COATED ORAL
Qty: 90 | Refills: 0 | Status: DISCONTINUED | COMMUNITY
Start: 2017-07-19 | End: 2022-05-19

## 2022-05-19 RX ORDER — PRAVASTATIN SODIUM 20 MG/1
20 TABLET ORAL
Refills: 0 | Status: DISCONTINUED | COMMUNITY
End: 2022-05-19

## 2022-05-19 RX ORDER — AMLODIPINE BESYLATE 2.5 MG/1
2.5 TABLET ORAL
Refills: 0 | Status: DISCONTINUED | COMMUNITY
End: 2022-05-19

## 2022-05-19 RX ORDER — DOCUSATE SODIUM 100 MG/1
100 CAPSULE, LIQUID FILLED ORAL
Refills: 0 | Status: DISCONTINUED | COMMUNITY
End: 2022-05-19

## 2022-05-19 RX ORDER — LOSARTAN POTASSIUM 50 MG/1
50 TABLET, FILM COATED ORAL
Refills: 0 | Status: DISCONTINUED | COMMUNITY
End: 2022-05-19

## 2022-05-19 RX ORDER — GABAPENTIN 100 MG/1
100 CAPSULE ORAL
Refills: 0 | Status: DISCONTINUED | COMMUNITY
End: 2022-05-19

## 2022-05-19 NOTE — HISTORY OF PRESENT ILLNESS
[FreeTextEntry1] : Mr. Amato is a 77 year old man with a history of seizures, mild cognitive impairment and gait imbalance who has been a patient of Dr. Baptiste and is now seeing me since Dr. Baptiste has retired.\par \par He is not aware of having any recent seizures.\par However, there are times when he is not sure if he has fallen asleep or had a seizure.\par He denies recent falls.\par He denies waking up with tongue bites.\par He has chronic urinary incontinence and wears diapers. He has not recently woken up with a diaper filled with urine.\par \par He says that walking is poor.\par \par He did have a sleep study at [facility] in December 2021 which showed mild sleep apnea with an AHI of 6.7, more significant in the supine position.\par He is only sleeping about 3 hours at a time due to urinary frequency.\par He sometimes falls back to sleep after getting up.\par \par

## 2022-05-19 NOTE — DISCUSSION/SUMMARY
[FreeTextEntry1] : 77 year old man with a history of focal seizures with impaired awareness and history of generalization as well as underlying mild cognitive impairment.\par \par Seizures\par -Currently stable\par -continue Keppra 500 mg in the AM and 1000 mg at night\par -Continue Vimpat 100 mg BID\par \par Gait instability\par -Multifactorial\par -Peripheral neuropathy\par -History of severe myelopathy and central cord syndrome s/p urgent anterior cervical decompression and fusion from C4-6 in 2017.\par \par Neck Pain\par -Seen by Dr. Alvarez. Felt to be musculoskeletal.\par -PT recommended. He is doing home exercises.\par -f/u MRi cervical spine in six months recommended by Dr. Alvarez.\par \par Sleep Disturbance\par -Polysomnogram in December 2021 showed mild YONI with AHI of 6.7. The non-supine AHI was 2 and the supine AHI was 20.\par -PLM Index was 20 but the arousal index was 1.\par -Will check vitamin B12, ferritin with next labs as low levels may contribute to PLMS. B212 was in the lower range in the past. He may benefit from supplementation.\par -For treatment of YONI, he is not interested in CPAP at this time.\par -Advised to avoid sleeping supine for now.\par -Trial of melatonin 1 mg at bedtime. I hope to increase nighttime sleep duration and decrease daytime sleepiness. \par \par f/u ~ 3 months, sooner if needed.\par

## 2022-05-19 NOTE — PHYSICAL EXAM
[FreeTextEntry1] : Examination:\par Constitutional: normal, no apparent distress\par Eyes: normal conjunctiva b/l, no ptosis, visual fields full\par Respiratory: no respiratory distress, normal effort, normal auscultation\par Cardiovascular: normal rate, rhythm, no murmurs\par Neck: supple, no masses\par Vascular: carotids normal\par Skin: normal color, no rashes\par Psych: normal mood, affect\par \par Neurological:\par Memory: normal memory, oriented to person, place, time\par Language intact/no aphasia\par Cranial Nerves: II-XII intact, Pupils equally round and reactive to light, ocular muscles/movements intact, no ptosis, no facial weakness, tongue protrudes normally in the midline, \par Motor: normal tone, no pronator drift, full strength in all four extremities in the proximal and distal muscle groups\par Coordination: Fine motor movements intact, rapid alternating movements intact, finger to nose intact bilaterally\par Sensory: decreased sensation in finger tips\par DTRs: 1/4 in both biceps, radialis, patellars\par Gait: slightly wide based\par

## 2022-06-14 LAB — VIT B12 SERPL-MCNC: 437 PG/ML

## 2022-07-22 ENCOUNTER — OUTPATIENT (OUTPATIENT)
Dept: OUTPATIENT SERVICES | Facility: HOSPITAL | Age: 77
LOS: 1 days | End: 2022-07-22
Payer: MEDICARE

## 2022-07-22 ENCOUNTER — APPOINTMENT (OUTPATIENT)
Dept: MRI IMAGING | Facility: CLINIC | Age: 77
End: 2022-07-22

## 2022-07-22 DIAGNOSIS — Z98.89 OTHER SPECIFIED POSTPROCEDURAL STATES: Chronic | ICD-10-CM

## 2022-07-22 DIAGNOSIS — M47.12 OTHER SPONDYLOSIS WITH MYELOPATHY, CERVICAL REGION: ICD-10-CM

## 2022-07-22 PROCEDURE — 72141 MRI NECK SPINE W/O DYE: CPT | Mod: 26,MH

## 2022-07-22 PROCEDURE — 72141 MRI NECK SPINE W/O DYE: CPT

## 2022-10-11 ENCOUNTER — APPOINTMENT (OUTPATIENT)
Dept: NEUROSURGERY | Facility: CLINIC | Age: 77
End: 2022-10-11

## 2022-10-11 VITALS
BODY MASS INDEX: 25.77 KG/M2 | TEMPERATURE: 97.8 F | WEIGHT: 180 LBS | SYSTOLIC BLOOD PRESSURE: 145 MMHG | HEIGHT: 70 IN | OXYGEN SATURATION: 99 % | HEART RATE: 60 BPM | DIASTOLIC BLOOD PRESSURE: 81 MMHG

## 2022-10-11 PROCEDURE — 99215 OFFICE O/P EST HI 40 MIN: CPT

## 2022-10-17 ENCOUNTER — APPOINTMENT (OUTPATIENT)
Dept: NEUROLOGY | Facility: CLINIC | Age: 77
End: 2022-10-17

## 2022-10-17 VITALS
DIASTOLIC BLOOD PRESSURE: 72 MMHG | SYSTOLIC BLOOD PRESSURE: 147 MMHG | HEIGHT: 70 IN | WEIGHT: 185 LBS | BODY MASS INDEX: 26.48 KG/M2 | TEMPERATURE: 97.8 F | HEART RATE: 66 BPM

## 2022-10-17 DIAGNOSIS — G47.61 PERIODIC LIMB MOVEMENT DISORDER: ICD-10-CM

## 2022-10-17 PROCEDURE — 99214 OFFICE O/P EST MOD 30 MIN: CPT

## 2022-10-17 NOTE — DISCUSSION/SUMMARY
[FreeTextEntry1] : 77 year old man with a history of focal seizures with impaired awareness and history of generalization as well as underlying mild cognitive impairment.\par \par Seizures\par -Currently stable without any evidence of recent seizures.\par -continue Keppra 500 mg in the AM and 1000 mg at night\par -Continue Vimpat 100 mg BID\par -Check levels when he has blood tests next.\par Refills sent for both for 90 day supplies. \par \par Gait instability\par -Multifactorial\par -Peripheral neuropathy\par -History of severe myelopathy and central cord syndrome s/p urgent anterior cervical decompression and fusion from C4-6 in 2017.\par \par Neck Pain\par -He follows with Dr. Alvarez.\par -He has not been able to go to physical therapy on a regular basis.\par \par \par B/L Hand numbness\par -Possible carpal tunnel syndrome\par -He was referred for NCV/EMG by Dr. Alvarez.\par -I offered to prescribe wrist splints to wear at night. He declined.\par \par Sleep Disturbance\par -Polysomnogram in December 2021 showed mild YONI with AHI of 6.7. The non-supine AHI was 2 and the supine AHI was 20.\par -PLM Index was 20 but the arousal index was 1.\par -B12 was normal. Will check ferritin. \par -For treatment of YONI, he is not interested in CPAP at this time.\par -Advised to avoid sleeping supine for now.\par -Discussed sleep hygiene\par He feels that these recommendations are unrealistic; he likes to stay up and watch sports and the news.\par \par f/u ~ 6 months, sooner if needed.\par

## 2022-10-17 NOTE — CONSULT LETTER
[Dear  ___] : Dear  [unfilled], [Consult Letter:] : I had the pleasure of evaluating your patient, [unfilled]. [Please see my note below.] : Please see my note below. [Consult Closing:] : Thank you very much for allowing me to participate in the care of this patient.  If you have any questions, please do not hesitate to contact me. [FreeTextEntry2] : Marlon Singh [FreeTextEntry3] : Sincerely,\par \par \par Senia Varghese MD\par Diplomate, American Academy of Psychiatry and Neurology\par Board Certified in the Subspecialty of Clinical Neurophysiology\par Board Certified in the Subspecialty of Sleep Medicine\par Board Certified in the Subspecialty of Epilepsy\par

## 2022-10-17 NOTE — DATA REVIEWED
[de-identified] : EEG 1/22/21: Diffuse background slowing [de-identified] : MRI cervical spine 7/22/22:\par \par Trace osseous stress reaction about the left C3/C4 facet joint with a trace joint effusion which is new since the prior examination.\par \par Redemonstration of C4-C6 anterior cervical discectomy and fusion. Otherwise grossly unchanged multilevel cervical spondylosis as outlined above. Findings are again most prominent at the level of C3/C4 where there is unchanged near-complete circumferential effacement of the CSF column with contacting the cord.\par \par Unchanged myelomalacia within the cervical cord at the level of C5.\par

## 2022-10-17 NOTE — HISTORY OF PRESENT ILLNESS
[FreeTextEntry1] : 5/19/22:\par Mr. Amato is a 77 year old man with a history of seizures, mild cognitive impairment and gait imbalance who has been a patient of Dr. Baptiste and is now seeing me since Dr. Baptiste has retired.\par \par He is not aware of having any recent seizures.\par However, there are times when he is not sure if he has fallen asleep or had a seizure.\par He denies recent falls.\par He denies waking up with tongue bites.\par He has chronic urinary incontinence and wears diapers. He has not recently woken up with a diaper filled with urine.\par \par He says that walking is poor.\par \par He did have a sleep study at [facility] in December 2021 which showed mild sleep apnea with an AHI of 6.7, more significant in the supine position.\par He is only sleeping about 3 hours at a time due to urinary frequency.\par He sometimes falls back to sleep after getting up.\par \par 10/17/22:\par He went to see Dr. Alvarez last week.\par He notes a clicking in his neck and is having some problems with fine motor control in both hands. \par Dr. Alvarez felt that in addition to myelopathy he may have some carpal tunnel syndrome.\par He does have some numbness in his fingers.\par \par He is not aware of any recent seizures.\par He denies waking up recently with tongue bites or urinary incontinence.\par \par He thinks that his memory problem is slightly better recently.\par Many months ago he left a supermarket without paying and he did not remember this. He has not had any similar episodes recently.\par \par He does report some dizziness and sleepiness.\par He says that he does not get good sleep due to frequent urination.\par

## 2022-11-08 ENCOUNTER — ASC (OUTPATIENT)
Dept: URBAN - METROPOLITAN AREA SURGERY 8 | Facility: SURGERY | Age: 77
Setting detail: OPHTHALMOLOGY
End: 2022-11-08
Payer: MEDICARE

## 2022-11-08 DIAGNOSIS — H34.8110: ICD-10-CM

## 2022-11-08 PROCEDURE — 67210 TREATMENT OF RETINAL LESION: CPT | Performed by: OPHTHALMOLOGY

## 2022-11-08 ASSESSMENT — TONOMETRY
OS_IOP_MMHG: 13
OD_IOP_MMHG: 13

## 2022-11-08 ASSESSMENT — KERATOMETRY
OD_K1POWER_DIOPTERS: 42.50
OS_AXISANGLE_DEGREES: 173
OS_K1POWER_DIOPTERS: 42.75
OS_K2POWER_DIOPTERS: 45.25
OD_K2POWER_DIOPTERS: 44.75
OD_AXISANGLE_DEGREES: 177

## 2022-11-08 ASSESSMENT — VISUAL ACUITY
OS_BCVA: 20/70
OD_BCVA: 20/40

## 2022-11-08 ASSESSMENT — REFRACTION_AUTOREFRACTION
OD_AXIS: 94
OD_CYLINDER: -2.50
OD_SPHERE: -1.25
OS_AXIS: 84
OS_CYLINDER: -3.00
OS_SPHERE: -1.25

## 2022-11-08 ASSESSMENT — SPHEQUIV_DERIVED
OS_SPHEQUIV: -2.75
OD_SPHEQUIV: -2.5

## 2022-11-08 ASSESSMENT — CONFRONTATIONAL VISUAL FIELD TEST (CVF)
OS_FINDINGS: FULL
OD_FINDINGS: FULL

## 2022-11-08 ASSESSMENT — AXIALLENGTH_DERIVED
OS_AL: 24.5144
OD_AL: 24.558

## 2022-11-08 ASSESSMENT — DECREASING TEAR LAKE - SEVERITY SCORE
OD_DEC_TEARLAKE: T
OS_DEC_TEARLAKE: T

## 2022-11-23 ENCOUNTER — ASC (OUTPATIENT)
Dept: URBAN - METROPOLITAN AREA SURGERY 8 | Facility: SURGERY | Age: 77
Setting detail: OPHTHALMOLOGY
End: 2022-11-23
Payer: MEDICARE

## 2022-11-23 DIAGNOSIS — E11.3312: ICD-10-CM

## 2022-11-23 PROCEDURE — 67210 TREATMENT OF RETINAL LESION: CPT | Performed by: OPHTHALMOLOGY

## 2022-11-23 ASSESSMENT — SPHEQUIV_DERIVED
OD_SPHEQUIV: -2.5
OS_SPHEQUIV: -2.75

## 2022-11-23 ASSESSMENT — REFRACTION_AUTOREFRACTION
OD_CYLINDER: -2.50
OS_CYLINDER: -3.00
OS_AXIS: 84
OD_SPHERE: -1.25
OS_SPHERE: -1.25
OD_AXIS: 94

## 2022-11-23 ASSESSMENT — VISUAL ACUITY
OS_BCVA: 20/60
OD_BCVA: 20/30-1

## 2022-11-23 ASSESSMENT — KERATOMETRY
OS_K2POWER_DIOPTERS: 45.25
OS_AXISANGLE_DEGREES: 173
OD_K2POWER_DIOPTERS: 44.75
OD_AXISANGLE_DEGREES: 177
OD_K1POWER_DIOPTERS: 42.50
OS_K1POWER_DIOPTERS: 42.75

## 2022-11-23 ASSESSMENT — DECREASING TEAR LAKE - SEVERITY SCORE
OS_DEC_TEARLAKE: T
OD_DEC_TEARLAKE: T

## 2022-11-23 ASSESSMENT — AXIALLENGTH_DERIVED
OS_AL: 24.5144
OD_AL: 24.558

## 2022-11-23 ASSESSMENT — TONOMETRY
OS_IOP_MMHG: 14
OD_IOP_MMHG: 14

## 2022-12-05 LAB
FERRITIN SERPL-MCNC: 60 NG/ML
IRON SATN MFR SERPL: 28 %
IRON SERPL-MCNC: 105 UG/DL
TIBC SERPL-MCNC: 379 UG/DL
UIBC SERPL-MCNC: 275 UG/DL

## 2022-12-07 ENCOUNTER — OFFICE (OUTPATIENT)
Dept: URBAN - METROPOLITAN AREA CLINIC 94 | Facility: CLINIC | Age: 77
Setting detail: OPHTHALMOLOGY
End: 2022-12-07
Payer: MEDICARE

## 2022-12-07 DIAGNOSIS — H43.393: ICD-10-CM

## 2022-12-07 DIAGNOSIS — H35.033: ICD-10-CM

## 2022-12-07 DIAGNOSIS — H34.8110: ICD-10-CM

## 2022-12-07 DIAGNOSIS — E11.3291: ICD-10-CM

## 2022-12-07 DIAGNOSIS — E11.3312: ICD-10-CM

## 2022-12-07 PROCEDURE — 92134 CPTRZ OPH DX IMG PST SGM RTA: CPT | Performed by: OPHTHALMOLOGY

## 2022-12-07 PROCEDURE — 67028 INJECTION EYE DRUG: CPT | Performed by: OPHTHALMOLOGY

## 2022-12-07 ASSESSMENT — VISUAL ACUITY
OS_BCVA: 20/80+2
OD_BCVA: 20/70

## 2022-12-07 ASSESSMENT — TONOMETRY
OD_IOP_MMHG: 12
OS_IOP_MMHG: 11

## 2022-12-07 ASSESSMENT — KERATOMETRY
OS_AXISANGLE_DEGREES: 173
OD_K2POWER_DIOPTERS: 44.75
OD_K1POWER_DIOPTERS: 42.50
OD_AXISANGLE_DEGREES: 177
OS_K1POWER_DIOPTERS: 42.75
OS_K2POWER_DIOPTERS: 45.25

## 2022-12-07 ASSESSMENT — DECREASING TEAR LAKE - SEVERITY SCORE
OS_DEC_TEARLAKE: T
OD_DEC_TEARLAKE: T

## 2022-12-07 ASSESSMENT — REFRACTION_AUTOREFRACTION
OD_CYLINDER: -2.50
OD_SPHERE: -1.25
OD_AXIS: 94
OS_SPHERE: -1.25
OS_CYLINDER: -3.00
OS_AXIS: 84

## 2022-12-07 ASSESSMENT — CONFRONTATIONAL VISUAL FIELD TEST (CVF)
OD_FINDINGS: FULL
OS_FINDINGS: FULL

## 2022-12-07 ASSESSMENT — SPHEQUIV_DERIVED
OS_SPHEQUIV: -2.75
OD_SPHEQUIV: -2.5

## 2022-12-07 ASSESSMENT — AXIALLENGTH_DERIVED
OD_AL: 24.558
OS_AL: 24.5144

## 2022-12-12 ENCOUNTER — NON-APPOINTMENT (OUTPATIENT)
Age: 77
End: 2022-12-12

## 2023-01-05 NOTE — PATIENT PROFILE ADULT. - ABILITY TO HEAR (WITH HEARING AID OR HEARING APPLIANCE IF NORMALLY USED):
Mildly to Moderately Impaired: difficulty hearing in some environments or speaker may need to increase volume or speak distinctly
General Sunscreen Counseling: I recommended a broad spectrum sunscreen with a SPF of 30 or higher. Reviewed with patient the importance of examining skin for any changes, monitoring existing moles and lesions, and reviewed the ABCDEs of skin cancers including melanoma.
Detail Level: Generalized

## 2023-01-17 ENCOUNTER — OFFICE (OUTPATIENT)
Dept: URBAN - METROPOLITAN AREA CLINIC 94 | Facility: CLINIC | Age: 78
Setting detail: OPHTHALMOLOGY
End: 2023-01-17
Payer: MEDICARE

## 2023-01-17 DIAGNOSIS — E11.3312: ICD-10-CM

## 2023-01-17 DIAGNOSIS — H35.033: ICD-10-CM

## 2023-01-17 DIAGNOSIS — H34.8110: ICD-10-CM

## 2023-01-17 DIAGNOSIS — E11.3291: ICD-10-CM

## 2023-01-17 DIAGNOSIS — H43.393: ICD-10-CM

## 2023-01-17 PROCEDURE — 92134 CPTRZ OPH DX IMG PST SGM RTA: CPT | Performed by: OPHTHALMOLOGY

## 2023-01-17 PROCEDURE — 67028 INJECTION EYE DRUG: CPT | Performed by: OPHTHALMOLOGY

## 2023-01-17 ASSESSMENT — AXIALLENGTH_DERIVED
OD_AL: 24.558
OS_AL: 24.5144

## 2023-01-17 ASSESSMENT — VISUAL ACUITY
OS_BCVA: 20/40
OD_BCVA: 20/20

## 2023-01-17 ASSESSMENT — REFRACTION_AUTOREFRACTION
OD_AXIS: 94
OD_CYLINDER: -2.50
OS_SPHERE: -1.25
OS_AXIS: 84
OD_SPHERE: -1.25
OS_CYLINDER: -3.00

## 2023-01-17 ASSESSMENT — KERATOMETRY
OD_K1POWER_DIOPTERS: 42.50
OS_K2POWER_DIOPTERS: 45.25
OS_K1POWER_DIOPTERS: 42.75
OD_AXISANGLE_DEGREES: 177
OD_K2POWER_DIOPTERS: 44.75
OS_AXISANGLE_DEGREES: 173

## 2023-01-17 ASSESSMENT — DECREASING TEAR LAKE - SEVERITY SCORE
OS_DEC_TEARLAKE: T
OD_DEC_TEARLAKE: T

## 2023-01-17 ASSESSMENT — SPHEQUIV_DERIVED
OS_SPHEQUIV: -2.75
OD_SPHEQUIV: -2.5

## 2023-01-17 ASSESSMENT — TONOMETRY
OS_IOP_MMHG: 13
OD_IOP_MMHG: 10

## 2023-01-17 ASSESSMENT — CONFRONTATIONAL VISUAL FIELD TEST (CVF)
OD_FINDINGS: FULL
OS_FINDINGS: FULL

## 2023-01-30 ENCOUNTER — OFFICE (OUTPATIENT)
Dept: URBAN - METROPOLITAN AREA CLINIC 94 | Facility: CLINIC | Age: 78
Setting detail: OPHTHALMOLOGY
End: 2023-01-30
Payer: MEDICARE

## 2023-01-30 DIAGNOSIS — H43.393: ICD-10-CM

## 2023-01-30 DIAGNOSIS — E11.3312: ICD-10-CM

## 2023-01-30 DIAGNOSIS — H34.8110: ICD-10-CM

## 2023-01-30 DIAGNOSIS — H35.033: ICD-10-CM

## 2023-01-30 DIAGNOSIS — E11.3291: ICD-10-CM

## 2023-01-30 PROCEDURE — 92134 CPTRZ OPH DX IMG PST SGM RTA: CPT | Performed by: OPHTHALMOLOGY

## 2023-01-30 PROCEDURE — 67028 INJECTION EYE DRUG: CPT | Performed by: OPHTHALMOLOGY

## 2023-01-30 ASSESSMENT — SPHEQUIV_DERIVED
OS_SPHEQUIV: -2.75
OD_SPHEQUIV: -2.5

## 2023-01-30 ASSESSMENT — VISUAL ACUITY
OD_BCVA: 20/30
OS_BCVA: 20/60+1

## 2023-01-30 ASSESSMENT — DECREASING TEAR LAKE - SEVERITY SCORE
OS_DEC_TEARLAKE: T
OD_DEC_TEARLAKE: T

## 2023-01-30 ASSESSMENT — KERATOMETRY
OD_AXISANGLE_DEGREES: 177
OD_K2POWER_DIOPTERS: 44.75
OS_K2POWER_DIOPTERS: 45.25
OS_K1POWER_DIOPTERS: 42.75
OS_AXISANGLE_DEGREES: 173
OD_K1POWER_DIOPTERS: 42.50

## 2023-01-30 ASSESSMENT — REFRACTION_AUTOREFRACTION
OS_SPHERE: -1.25
OS_AXIS: 84
OD_CYLINDER: -2.50
OS_CYLINDER: -3.00
OD_SPHERE: -1.25
OD_AXIS: 94

## 2023-01-30 ASSESSMENT — CONFRONTATIONAL VISUAL FIELD TEST (CVF)
OS_FINDINGS: FULL
OD_FINDINGS: FULL

## 2023-01-30 ASSESSMENT — AXIALLENGTH_DERIVED
OD_AL: 24.558
OS_AL: 24.5144

## 2023-01-30 ASSESSMENT — TONOMETRY
OD_IOP_MMHG: 11
OS_IOP_MMHG: 14

## 2023-02-01 ENCOUNTER — APPOINTMENT (OUTPATIENT)
Dept: NEUROLOGY | Facility: CLINIC | Age: 78
End: 2023-02-01
Payer: MEDICARE

## 2023-02-01 PROCEDURE — 99213 OFFICE O/P EST LOW 20 MIN: CPT

## 2023-02-01 PROCEDURE — 95886 MUSC TEST DONE W/N TEST COMP: CPT

## 2023-02-01 PROCEDURE — 95910 NRV CNDJ TEST 7-8 STUDIES: CPT

## 2023-02-01 NOTE — REVIEW OF SYSTEMS
[As Noted in HPI] : as noted in HPI [Hand Weakness] :  hand weakness [Numbness] : numbness [Tingling] : tingling [Seizures] : convulsions [Negative] : Heme/Lymph

## 2023-02-01 NOTE — HISTORY OF PRESENT ILLNESS
[FreeTextEntry1] : 77-year-old man history of seizure disorder, cognitive impairment, complaining of numbness, weakness of the hands now for many years.

## 2023-02-01 NOTE — PHYSICAL EXAM
[General Appearance - Alert] : alert [General Appearance - In No Acute Distress] : in no acute distress [Oriented To Time, Place, And Person] : oriented to person, place, and time [Impaired Insight] : insight and judgment were intact [Affect] : the affect was normal [Motor Strength Upper Extremities Bilaterally] : there was weakness in both upper extremities [Motor Strength Shoulders Right Weakness] : normal shoulder strength [Motor Strength Upper Extremities Right] : arm weakness was present [Motor Strength Forearms Right Weakness] : forearm weakness was  present [Motor Strength Wrists Right Weakness] : wrist weakness was present [Hand Weakness Right] : normal hand  [Motor Strength Shoulders Left Weakness] : normal shoulder strength [Motor Strength Upper Extremities Left] : arm weakness was present [Motor Strength Forearms Left Weakness] : forearm weakness was present [Motor Strength Wrists Left Weakness] : wrist weakness was present [4] : wrist ulnar deviation 4/5 [Hand Weakness Left] : normal hand  [Sensation Tactile Decrease] : light touch was intact [1+] : Patella left 1+ [0] : Ankle jerk left 0 [FreeTextEntry6] : Wasting of forearm muscles

## 2023-02-01 NOTE — PROCEDURE
[FreeTextEntry1] : Electromyography and nerve conduction study of the upper extremity demonstrates evidence of moderate bilateral median nerve entrapment neuropathy at the wrist consistent with a carpal tunnel syndrome.\par The presence of slow latencies and reduced amplitudes of both ulnar sensory nerve suggesting underlying peripheral neuropathy.\par Evidence of chronic changes involving the bilateral C6, C7 and C8 nerve roots, consistent with a chronic radiculopathy.

## 2023-02-01 NOTE — DISCUSSION/SUMMARY
[FreeTextEntry1] : 77-year-old man, complaining of weakness, numbness of the upper extremities, electrodiagnostic study shows evidence of a bilateral carpal tunnel syndrome, moderate as well as possibly underlying neuropathy.  Evidence of a chronic radiculopathy moving the C6-C7 and C8 nerve roots.\par Previous MRI of the cervical spine demonstrated with prior discectomy and fusion at C4 C6.\par Patient will follow-up with Dr. Varghese and Dr. Alvarez.

## 2023-02-07 ENCOUNTER — APPOINTMENT (OUTPATIENT)
Dept: NEUROSURGERY | Facility: CLINIC | Age: 78
End: 2023-02-07
Payer: MEDICARE

## 2023-02-07 VITALS
DIASTOLIC BLOOD PRESSURE: 70 MMHG | HEIGHT: 71 IN | SYSTOLIC BLOOD PRESSURE: 123 MMHG | OXYGEN SATURATION: 100 % | BODY MASS INDEX: 26.6 KG/M2 | WEIGHT: 190 LBS | HEART RATE: 76 BPM | TEMPERATURE: 98 F

## 2023-02-07 DIAGNOSIS — M47.12 OTHER SPONDYLOSIS WITH MYELOPATHY, CERVICAL REGION: ICD-10-CM

## 2023-02-07 DIAGNOSIS — M54.50 LOW BACK PAIN, UNSPECIFIED: ICD-10-CM

## 2023-02-07 DIAGNOSIS — Z81.2 FAMILY HISTORY OF TOBACCO ABUSE AND DEPENDENCE: ICD-10-CM

## 2023-02-07 DIAGNOSIS — Z82.49 FAMILY HISTORY OF ISCHEMIC HEART DISEASE AND OTHER DISEASES OF THE CIRCULATORY SYSTEM: ICD-10-CM

## 2023-02-07 DIAGNOSIS — S14.129A CENTRAL CORD SYNDROME AT UNSPECIFIED LVL OF CERVICAL SPINAL CORD, INITIAL ENCOUNTER: ICD-10-CM

## 2023-02-07 DIAGNOSIS — R20.0 ANESTHESIA OF SKIN: ICD-10-CM

## 2023-02-07 DIAGNOSIS — M79.603 PAIN IN ARM, UNSPECIFIED: ICD-10-CM

## 2023-02-07 DIAGNOSIS — M47.22 OTHER SPONDYLOSIS WITH MYELOPATHY, CERVICAL REGION: ICD-10-CM

## 2023-02-07 PROCEDURE — 99213 OFFICE O/P EST LOW 20 MIN: CPT

## 2023-02-07 NOTE — CONSULT LETTER
[Dear  ___] : Dear  [unfilled], [Courtesy Letter:] : I had the pleasure of seeing your patient, [unfilled], in my office today. [Sincerely,] : Sincerely, [FreeTextEntry2] : Marlon Bazan MD [address] [place] [address]   [FreeTextEntry1] : Mr. Amato is a very pleasant 77-year-old male patient who was seen in our office today in follow-up.  The patient previously underwent an anterior cervical decompression and fusion approximately 5 years ago.\par \par At this time, the patient's major complaint is stiffness in the neck and in the lower back.  The patient also complains of clicking noises when moving his neck.  The patient's most recent imaging from January 21, 2022 demonstrated adjacent segment degeneration worst at C3/4 with cervical stenosis.  With respect to myelopathic symptoms, the patient states that he is having increasing numbness in the hands particularly at night which is intermittent.  The patient's numbness is primarily in the hands and does not radiate from the neck.\par \par On examination, the patient is alert, oriented, and compliant with the exam.  The patient demonstrates full strength in the upper and lower extremities bilaterally.  The patient demonstrates 2+ reflexes in the upper and lower extremities bilaterally.  The patient does not demonstrate a Phalen sign or Tinel's sign at the carpal tunnel.  The patient does not demonstrate a Fermin sign.  The patient demonstrates significant loss of range of motion at the wrist bilaterally.  The patient has moderate difficulty with tandem walk. \par \par The patient is accompanied with an updated MRI scan of the cervical spine dated July 22, 2022.  These images demonstrate stability of the patient's adjacent segment disease at C3/4.  There is near complete effacement of CSF at this level and joint effusions at this level.\par \par Taken together, the patient has a clinical history and radiographic findings suggestive of a progressive myelopathy secondary to cervical stenosis.  However, the patient may also have a component of carpal tunnel syndrome and I have recommended EMG/nerve conduction studies to rule out this possibility.  I have recommended hand therapy as well as neck and low back physical therapy at this time for symptomatic relief.  I informed the patient that he would be a surgical candidate for a cervical laminectomy at C3/4 should his myelopathic symptoms worsen.  At this time, the patient is not considering surgical intervention for his symptoms and would like to pursue further investigations.  I have recommended follow-up with us once he obtains his EMG/nerve conduction study so that we can review his findings and develop a more specific treatment plan going forward. [FreeTextEntry3] : Roberto Alvarez MD, PhD, CS, FAANS Attending Neurosurgeon  of Neurosurgery [facility] [employer] [facility] at [facility] at [address], [place] [address] Office: [phone] Fax: [phone]

## 2023-02-07 NOTE — REVIEW OF SYSTEMS
[Numbness] : numbness [Tingling] : tingling [Negative] : Heme/Lymph [de-identified] : neck pain \par back pain

## 2023-02-07 NOTE — CONSULT LETTER
[Dear  ___] : Dear  [unfilled], [Courtesy Letter:] : I had the pleasure of seeing your patient, [unfilled], in my office today. [Sincerely,] : Sincerely, [FreeTextEntry2] : Marlon Bazan MD\par  [address] [place] [address]\[place] [address]     [FreeTextEntry1] : Mr. Amato is a very pleasant 77-year-old male patient who was seen in our office today in follow-up.  The patient is well known to the office for a previous anterior cervical decompression and fusion approximately 5 years ago.  Today the patient complaint is stiffness in the neck and in the lower back.  The patient also complains of clicking noises when moving his neck.   The patient has increasing numbness of his hands.  The numb hands are not radicular in nature and is worse at night.  The back pain is present with motion.  Both the neck and back pain is intermittent and 4/10.  He does not report any severe pain or pain that limits activity.   The patient has not attended physical therapy as advised.     \par \par The patient has undergone an EMG since his last visit.  The EMG is from Dr Irby's office dated  2/1/2023.   The EMG shows bilateral chronic C6 C7 C8 radiculopathies and  moderate median nerve neuropathy at the wrist indicative of carpal tunnel syndrome.  In addition, the testing showed an underlying ulnar nerve neuropathy.   \par \par On examination, the patient is alert, oriented, and compliant with the exam.  The patient demonstrates full strength in the upper and lower extremities bilaterally.    The patient does not demonstrate a Fermin sign.  The patient has some difficulty with tandem walk. \par \par The patient has known cervical stenosis and neck / back pain.  The EMG shows multiple findings including cervical radiculopathy and median nerve neuropathy.  The patient is unable to consider physical therapy at this time due to transportation issues.  The patient is not a fan of home PT either.  The patient indicated that he is not in favor of any surgical intervention.  He indicated he would not use wrist stablizers.  I have prescribed Gabapentin at 100 mg three times a day.  We reviewed side effects and the instructions for use.  He understands that the dosage may be increased under supervision.  Hopefully this will alleviate some of his pain.  The patient will follow up in  two to three months in follow up.  If he has side effects towards Gabapentin he will call the office.         \par \par Thank you for very kindly including me in the evaluation and treatment of your patient.  Please do not hesitate to contact me should you have any concerns or questions regarding this evaluation or proposed follow-up treatment and evaluation plan. [FreeTextEntry3] : Kimberly Lombardo, DNP, NP Nurse Practitioner Neurosurgery and Spine [facility] [place] [facility]  [employer] [facility] [employer] email: [email or web address]  [address] P- [phone] F- [phone]

## 2023-02-07 NOTE — REASON FOR VISIT
[Follow-Up: _____] : a [unfilled] follow-up visit [Other: _____] : [unfilled] [FreeTextEntry1] : neck and back pain

## 2023-02-14 NOTE — PROVIDER CONTACT NOTE (OTHER) - RECOMMENDATIONS
md at bedside to assess 650m tylenol given. Fluid bolus started. UA sent, x-ray notified. Lab notified. report off to margie HART. New Peripheral IV started on right forearm. 1915 pt. condition improved.
Cardiology consult, pain  medication and to see patient
Speaking Coherently

## 2023-03-07 ENCOUNTER — OFFICE (OUTPATIENT)
Dept: URBAN - METROPOLITAN AREA CLINIC 94 | Facility: CLINIC | Age: 78
Setting detail: OPHTHALMOLOGY
End: 2023-03-07
Payer: MEDICARE

## 2023-03-07 DIAGNOSIS — H34.8110: ICD-10-CM

## 2023-03-07 DIAGNOSIS — H35.033: ICD-10-CM

## 2023-03-07 DIAGNOSIS — E11.3312: ICD-10-CM

## 2023-03-07 DIAGNOSIS — E11.3291: ICD-10-CM

## 2023-03-07 DIAGNOSIS — H43.393: ICD-10-CM

## 2023-03-07 PROCEDURE — 92134 CPTRZ OPH DX IMG PST SGM RTA: CPT | Performed by: OPHTHALMOLOGY

## 2023-03-07 PROCEDURE — 67210 TREATMENT OF RETINAL LESION: CPT | Performed by: OPHTHALMOLOGY

## 2023-03-07 ASSESSMENT — SPHEQUIV_DERIVED
OS_SPHEQUIV: -2.75
OD_SPHEQUIV: -2.5

## 2023-03-07 ASSESSMENT — REFRACTION_AUTOREFRACTION
OS_AXIS: 84
OD_CYLINDER: -2.50
OS_SPHERE: -1.25
OD_SPHERE: -1.25
OD_AXIS: 94
OS_CYLINDER: -3.00

## 2023-03-07 ASSESSMENT — TONOMETRY
OD_IOP_MMHG: 11
OS_IOP_MMHG: 13

## 2023-03-07 ASSESSMENT — KERATOMETRY
OS_AXISANGLE_DEGREES: 173
OS_K1POWER_DIOPTERS: 42.75
OS_K2POWER_DIOPTERS: 45.25
OD_K1POWER_DIOPTERS: 42.50
OD_AXISANGLE_DEGREES: 177
OD_K2POWER_DIOPTERS: 44.75

## 2023-03-07 ASSESSMENT — DECREASING TEAR LAKE - SEVERITY SCORE
OD_DEC_TEARLAKE: T
OS_DEC_TEARLAKE: T

## 2023-03-07 ASSESSMENT — CONFRONTATIONAL VISUAL FIELD TEST (CVF)
OD_FINDINGS: FULL
OS_FINDINGS: FULL

## 2023-03-07 ASSESSMENT — AXIALLENGTH_DERIVED
OD_AL: 24.558
OS_AL: 24.5144

## 2023-03-07 ASSESSMENT — VISUAL ACUITY
OS_BCVA: 20/60
OD_BCVA: 20/25

## 2023-03-29 ENCOUNTER — OFFICE (OUTPATIENT)
Dept: URBAN - METROPOLITAN AREA CLINIC 94 | Facility: CLINIC | Age: 78
Setting detail: OPHTHALMOLOGY
End: 2023-03-29
Payer: MEDICARE

## 2023-03-29 ENCOUNTER — ASC (OUTPATIENT)
Dept: URBAN - METROPOLITAN AREA SURGERY 8 | Facility: SURGERY | Age: 78
Setting detail: OPHTHALMOLOGY
End: 2023-03-29
Payer: MEDICARE

## 2023-03-29 DIAGNOSIS — H34.8110: ICD-10-CM

## 2023-03-29 DIAGNOSIS — H43.393: ICD-10-CM

## 2023-03-29 DIAGNOSIS — E11.3291: ICD-10-CM

## 2023-03-29 DIAGNOSIS — H35.033: ICD-10-CM

## 2023-03-29 DIAGNOSIS — E11.3312: ICD-10-CM

## 2023-03-29 PROCEDURE — 67210 TREATMENT OF RETINAL LESION: CPT | Performed by: OPHTHALMOLOGY

## 2023-03-29 PROCEDURE — 92134 CPTRZ OPH DX IMG PST SGM RTA: CPT | Performed by: OPHTHALMOLOGY

## 2023-03-29 ASSESSMENT — CONFRONTATIONAL VISUAL FIELD TEST (CVF)
OD_FINDINGS: FULL
OS_FINDINGS: FULL

## 2023-03-29 ASSESSMENT — KERATOMETRY
OD_K2POWER_DIOPTERS: 44.75
OD_K1POWER_DIOPTERS: 42.50
OS_K2POWER_DIOPTERS: 45.25
OD_AXISANGLE_DEGREES: 177
OS_K1POWER_DIOPTERS: 42.75
OS_AXISANGLE_DEGREES: 173

## 2023-03-29 ASSESSMENT — REFRACTION_AUTOREFRACTION
OS_SPHERE: -1.25
OS_AXIS: 84
OD_AXIS: 94
OS_CYLINDER: -3.00
OD_SPHERE: -1.25
OD_CYLINDER: -2.50

## 2023-03-29 ASSESSMENT — AXIALLENGTH_DERIVED
OS_AL: 24.5144
OD_AL: 24.558

## 2023-03-29 ASSESSMENT — VISUAL ACUITY
OD_BCVA: 20/25+
OS_BCVA: 20/70

## 2023-03-29 ASSESSMENT — DECREASING TEAR LAKE - SEVERITY SCORE
OS_DEC_TEARLAKE: T
OD_DEC_TEARLAKE: T

## 2023-03-29 ASSESSMENT — TONOMETRY
OD_IOP_MMHG: 11
OS_IOP_MMHG: 15

## 2023-03-29 ASSESSMENT — SPHEQUIV_DERIVED
OD_SPHEQUIV: -2.5
OS_SPHEQUIV: -2.75

## 2023-04-07 RX ORDER — GABAPENTIN 100 MG/1
100 CAPSULE ORAL 3 TIMES DAILY
Qty: 30 | Refills: 2 | Status: DISCONTINUED | COMMUNITY
Start: 2023-02-07 | End: 2023-04-07

## 2023-04-11 ENCOUNTER — APPOINTMENT (OUTPATIENT)
Dept: NEUROSURGERY | Facility: CLINIC | Age: 78
End: 2023-04-11

## 2023-04-13 ENCOUNTER — NON-APPOINTMENT (OUTPATIENT)
Age: 78
End: 2023-04-13

## 2023-04-25 ENCOUNTER — OFFICE (OUTPATIENT)
Dept: URBAN - METROPOLITAN AREA CLINIC 94 | Facility: CLINIC | Age: 78
Setting detail: OPHTHALMOLOGY
End: 2023-04-25
Payer: MEDICARE

## 2023-04-25 DIAGNOSIS — H43.393: ICD-10-CM

## 2023-04-25 DIAGNOSIS — H34.8110: ICD-10-CM

## 2023-04-25 DIAGNOSIS — H35.033: ICD-10-CM

## 2023-04-25 DIAGNOSIS — E11.3312: ICD-10-CM

## 2023-04-25 DIAGNOSIS — E11.3291: ICD-10-CM

## 2023-04-25 PROCEDURE — 92134 CPTRZ OPH DX IMG PST SGM RTA: CPT | Performed by: OPHTHALMOLOGY

## 2023-04-25 PROCEDURE — 67028 INJECTION EYE DRUG: CPT | Performed by: OPHTHALMOLOGY

## 2023-04-25 ASSESSMENT — TONOMETRY
OS_IOP_MMHG: 15
OD_IOP_MMHG: 12

## 2023-04-25 ASSESSMENT — REFRACTION_AUTOREFRACTION
OD_AXIS: 94
OD_SPHERE: -1.25
OD_CYLINDER: -2.50
OS_AXIS: 84
OS_SPHERE: -1.25
OS_CYLINDER: -3.00

## 2023-04-25 ASSESSMENT — CONFRONTATIONAL VISUAL FIELD TEST (CVF)
OD_FINDINGS: FULL
OS_FINDINGS: FULL

## 2023-04-25 ASSESSMENT — SPHEQUIV_DERIVED
OD_SPHEQUIV: -2.5
OS_SPHEQUIV: -2.75

## 2023-04-25 ASSESSMENT — KERATOMETRY
OS_K2POWER_DIOPTERS: 45.25
OD_K2POWER_DIOPTERS: 44.75
OS_AXISANGLE_DEGREES: 173
OS_K1POWER_DIOPTERS: 42.75
OD_K1POWER_DIOPTERS: 42.50
OD_AXISANGLE_DEGREES: 177

## 2023-04-25 ASSESSMENT — DECREASING TEAR LAKE - SEVERITY SCORE
OS_DEC_TEARLAKE: T
OD_DEC_TEARLAKE: T

## 2023-04-25 ASSESSMENT — AXIALLENGTH_DERIVED
OS_AL: 24.5144
OD_AL: 24.558

## 2023-04-25 ASSESSMENT — VISUAL ACUITY
OD_BCVA: 20/25
OS_BCVA: 20/70-1

## 2023-04-27 DIAGNOSIS — C61 MALIGNANT NEOPLASM OF PROSTATE: ICD-10-CM

## 2023-05-09 LAB
APPEARANCE: CLEAR
BACTERIA: NEGATIVE /HPF
BILIRUBIN URINE: NEGATIVE
BLOOD URINE: NEGATIVE
CAST: 18 /LPF
COLOR: NORMAL
EPITHELIAL CELLS: 5 /HPF
GLUCOSE QUALITATIVE U: NEGATIVE
HYALINE CASTS: PRESENT
KETONES URINE: NEGATIVE
LEUKOCYTE ESTERASE URINE: NEGATIVE
MICROSCOPIC-UA: NORMAL
NITRITE URINE: NEGATIVE
PH URINE: 6
PROTEIN URINE: ABNORMAL
PSA SERPL-MCNC: 1.23 NG/ML
RED BLOOD CELLS URINE: 2 /HPF
REVIEW: NORMAL
SPECIFIC GRAVITY URINE: 1.02
UROBILINOGEN URINE: NORMAL
WHITE BLOOD CELLS URINE: 2 /HPF

## 2023-05-10 LAB — URINE CYTOLOGY: NORMAL

## 2023-05-12 ENCOUNTER — APPOINTMENT (OUTPATIENT)
Dept: UROLOGY | Facility: CLINIC | Age: 78
End: 2023-05-12

## 2023-05-18 ENCOUNTER — APPOINTMENT (OUTPATIENT)
Dept: UROLOGY | Facility: CLINIC | Age: 78
End: 2023-05-18
Payer: MEDICARE

## 2023-05-18 VITALS
HEIGHT: 70 IN | WEIGHT: 190 LBS | DIASTOLIC BLOOD PRESSURE: 76 MMHG | BODY MASS INDEX: 27.2 KG/M2 | HEART RATE: 85 BPM | RESPIRATION RATE: 15 BRPM | TEMPERATURE: 97.5 F | SYSTOLIC BLOOD PRESSURE: 160 MMHG | OXYGEN SATURATION: 93 %

## 2023-05-18 PROCEDURE — 99213 OFFICE O/P EST LOW 20 MIN: CPT

## 2023-05-19 NOTE — HISTORY OF PRESENT ILLNESS
[FreeTextEntry1] : 78M presents today for a checkup. He needs a refill on Myrbetriq. His labs were done and everything was fine.

## 2023-05-23 ENCOUNTER — OFFICE (OUTPATIENT)
Dept: URBAN - METROPOLITAN AREA CLINIC 102 | Facility: CLINIC | Age: 78
Setting detail: OPHTHALMOLOGY
End: 2023-05-23
Payer: MEDICARE

## 2023-05-23 DIAGNOSIS — E11.3291: ICD-10-CM

## 2023-05-23 DIAGNOSIS — E11.3312: ICD-10-CM

## 2023-05-23 DIAGNOSIS — H34.8110: ICD-10-CM

## 2023-05-23 DIAGNOSIS — H90.3: ICD-10-CM

## 2023-05-23 PROCEDURE — 92550 TYMPANOMETRY & REFLEX THRESH: CPT | Performed by: AUDIOLOGIST

## 2023-05-23 PROCEDURE — 92014 COMPRE OPH EXAM EST PT 1/>: CPT | Performed by: OPHTHALMOLOGY

## 2023-05-23 PROCEDURE — 92557 COMPREHENSIVE HEARING TEST: CPT | Performed by: AUDIOLOGIST

## 2023-05-23 PROCEDURE — 92134 CPTRZ OPH DX IMG PST SGM RTA: CPT | Performed by: OPHTHALMOLOGY

## 2023-05-23 ASSESSMENT — KERATOMETRY
METHOD_AUTO_MANUAL: AUTO
OS_K2POWER_DIOPTERS: 45.25
OS_AXISANGLE_DEGREES: 176
OD_AXISANGLE_DEGREES: 001
OD_K1POWER_DIOPTERS: 42.50
OD_K2POWER_DIOPTERS: 45.00
OS_K1POWER_DIOPTERS: 42.50

## 2023-05-23 ASSESSMENT — REFRACTION_AUTOREFRACTION
OD_AXIS: 095
OS_CYLINDER: -3.00
OS_SPHERE: -1.50
OD_SPHERE: -1.00
OS_AXIS: 087
OD_CYLINDER: -2.75

## 2023-05-23 ASSESSMENT — AXIALLENGTH_DERIVED
OS_AL: 24.6701
OD_AL: 24.4559

## 2023-05-23 ASSESSMENT — CONFRONTATIONAL VISUAL FIELD TEST (CVF)
OD_FINDINGS: FULL
OS_FINDINGS: FULL

## 2023-05-23 ASSESSMENT — SPHEQUIV_DERIVED
OD_SPHEQUIV: -2.375
OS_SPHEQUIV: -3

## 2023-05-23 ASSESSMENT — VISUAL ACUITY
OS_BCVA: 20/150
OD_BCVA: 20/20

## 2023-05-23 ASSESSMENT — DECREASING TEAR LAKE - SEVERITY SCORE
OS_DEC_TEARLAKE: T
OD_DEC_TEARLAKE: T

## 2023-05-23 ASSESSMENT — TONOMETRY: OD_IOP_MMHG: 11

## 2023-05-24 ENCOUNTER — OFFICE (OUTPATIENT)
Dept: URBAN - METROPOLITAN AREA CLINIC 94 | Facility: CLINIC | Age: 78
Setting detail: OPHTHALMOLOGY
End: 2023-05-24
Payer: MEDICARE

## 2023-05-24 DIAGNOSIS — H43.393: ICD-10-CM

## 2023-05-24 DIAGNOSIS — H35.033: ICD-10-CM

## 2023-05-24 DIAGNOSIS — E11.3291: ICD-10-CM

## 2023-05-24 DIAGNOSIS — E11.3312: ICD-10-CM

## 2023-05-24 DIAGNOSIS — H34.8110: ICD-10-CM

## 2023-05-24 PROCEDURE — 92134 CPTRZ OPH DX IMG PST SGM RTA: CPT | Performed by: OPHTHALMOLOGY

## 2023-05-24 PROCEDURE — 67028 INJECTION EYE DRUG: CPT | Performed by: OPHTHALMOLOGY

## 2023-05-24 ASSESSMENT — KERATOMETRY
OS_AXISANGLE_DEGREES: 176
OS_K1POWER_DIOPTERS: 42.50
OS_K2POWER_DIOPTERS: 45.25
METHOD_AUTO_MANUAL: AUTO
OD_K2POWER_DIOPTERS: 45.00
OD_AXISANGLE_DEGREES: 001
OD_K1POWER_DIOPTERS: 42.50

## 2023-05-24 ASSESSMENT — TONOMETRY
OS_IOP_MMHG: 13
OD_IOP_MMHG: 11

## 2023-05-24 ASSESSMENT — CONFRONTATIONAL VISUAL FIELD TEST (CVF)
OS_FINDINGS: FULL
OD_FINDINGS: FULL

## 2023-05-24 ASSESSMENT — AXIALLENGTH_DERIVED
OD_AL: 24.4559
OS_AL: 24.6701

## 2023-05-24 ASSESSMENT — REFRACTION_AUTOREFRACTION
OS_CYLINDER: -3.00
OD_SPHERE: -1.00
OS_AXIS: 087
OD_AXIS: 095
OS_SPHERE: -1.50
OD_CYLINDER: -2.75

## 2023-05-24 ASSESSMENT — DECREASING TEAR LAKE - SEVERITY SCORE
OD_DEC_TEARLAKE: T
OS_DEC_TEARLAKE: T

## 2023-05-24 ASSESSMENT — VISUAL ACUITY
OD_BCVA: 20/25
OS_BCVA: 20/80

## 2023-05-24 ASSESSMENT — SPHEQUIV_DERIVED
OD_SPHEQUIV: -2.375
OS_SPHEQUIV: -3

## 2023-05-30 ENCOUNTER — APPOINTMENT (OUTPATIENT)
Dept: NEUROLOGY | Facility: CLINIC | Age: 78
End: 2023-05-30
Payer: MEDICARE

## 2023-05-30 VITALS
BODY MASS INDEX: 27.2 KG/M2 | SYSTOLIC BLOOD PRESSURE: 123 MMHG | TEMPERATURE: 97.6 F | HEIGHT: 70 IN | HEART RATE: 73 BPM | WEIGHT: 190 LBS | DIASTOLIC BLOOD PRESSURE: 61 MMHG

## 2023-05-30 DIAGNOSIS — G56.00 CARPAL TUNNEL SYNDROME, UNSPECIFIED UPPER LIMB: ICD-10-CM

## 2023-05-30 DIAGNOSIS — R68.89 OTHER GENERAL SYMPTOMS AND SIGNS: ICD-10-CM

## 2023-05-30 DIAGNOSIS — G47.9 SLEEP DISORDER, UNSPECIFIED: ICD-10-CM

## 2023-05-30 PROCEDURE — 99213 OFFICE O/P EST LOW 20 MIN: CPT

## 2023-05-30 RX ORDER — ISOSORBIDE DINITRATE 30 MG/1
30 TABLET ORAL
Refills: 0 | Status: DISCONTINUED | COMMUNITY
End: 2023-05-30

## 2023-05-30 RX ORDER — ACETAMINOPHEN 500 MG/1
500 TABLET ORAL
Refills: 0 | Status: DISCONTINUED | COMMUNITY
End: 2023-05-30

## 2023-05-30 RX ORDER — PANTOPRAZOLE SODIUM 40 MG/1
40 GRANULE, DELAYED RELEASE ORAL
Refills: 0 | Status: DISCONTINUED | COMMUNITY
End: 2023-05-30

## 2023-05-30 RX ORDER — ASPIRIN 81 MG/1
81 TABLET, COATED ORAL
Refills: 0 | Status: DISCONTINUED | COMMUNITY
End: 2023-05-30

## 2023-05-30 RX ORDER — OTC SKIN PROTECTANT DRUG PRODUCTS 0.04 G/G
OINTMENT TOPICAL
Refills: 0 | Status: DISCONTINUED | COMMUNITY
End: 2023-05-30

## 2023-05-30 RX ORDER — CLOPIDOGREL BISULFATE 75 MG/1
75 TABLET, FILM COATED ORAL
Refills: 0 | Status: DISCONTINUED | COMMUNITY
End: 2023-05-30

## 2023-05-30 RX ORDER — MICONAZOLE NITRATE 20 MG/G
2 CREAM TOPICAL
Refills: 0 | Status: DISCONTINUED | COMMUNITY
End: 2023-05-30

## 2023-05-30 RX ORDER — LANCETS 33 GAUGE
EACH MISCELLANEOUS
Qty: 100 | Refills: 0 | Status: DISCONTINUED | COMMUNITY
Start: 2017-09-28 | End: 2023-05-30

## 2023-05-30 NOTE — HISTORY OF PRESENT ILLNESS
[FreeTextEntry1] : 5/19/22:\par Mr. Amato is a 77 year old man with a history of seizures, mild cognitive impairment and gait imbalance who has been a patient of Dr. Baptiste and is now seeing me since Dr. Baptiste has retired.\par \par He is not aware of having any recent seizures.\par However, there are times when he is not sure if he has fallen asleep or had a seizure.\par He denies recent falls.\par He denies waking up with tongue bites.\par He has chronic urinary incontinence and wears diapers. He has not recently woken up with a diaper filled with urine.\par \par He says that walking is poor.\par \par He did have a sleep study at [facility] in December 2021 which showed mild sleep apnea with an AHI of 6.7, more significant in the supine position.\par He is only sleeping about 3 hours at a time due to urinary frequency.\par He sometimes falls back to sleep after getting up.\par \par 10/17/22:\par He went to see Dr. Alvarez last week.\par He notes a clicking in his neck and is having some problems with fine motor control in both hands. \par Dr. Alvarez felt that in addition to myelopathy he may have some carpal tunnel syndrome.\par He does have some numbness in his fingers.\par \par He is not aware of any recent seizures.\par He denies waking up recently with tongue bites or urinary incontinence.\par \par He thinks that his memory problem is slightly better recently.\par Many months ago he left a supermarket without paying and he did not remember this. He has not had any similar episodes recently.\par \par He does report some dizziness and sleepiness.\par He says that he does not get good sleep due to frequent urination.\par \par 5/30/23:\par He denies having any recent seizures.\par \par He says that his memory is worse.\par He sometimes has difficulty finding words/names.\par Sometimes he forgets to takes his medications at the proper time.\par He says that he is unable to set alarms on his phone.\par He says that he has a weekly pill box but finds this to be more difficult for him because it is less clear when he is running low and needs a refill.\par \par He denies recent falls, but says that he has "collapsed". His legs gave out.\par He is doing the exercises that were taught to him at rehab.\par \par He was prescribed gabapentin by Kimberly Lombardo at Dr. Alvarez's office for his neck pain. He thinks that it is helping.\par \par \par \par

## 2023-05-30 NOTE — PHYSICAL EXAM
[FreeTextEntry1] : Examination:\par Constitutional: normal, no apparent distress\par Eyes: normal conjunctiva b/l, no ptosis, visual fields full\par Respiratory: no respiratory distress, normal effort, normal auscultation\par Cardiovascular: normal rate, rhythm, no murmurs\par Neck: supple, no masses\par Vascular: carotids normal\par Skin: normal color, no rashes\par Psych: normal mood, affect\par \par Neurological:\par Memory: normal memory, oriented to person, place, time\par Language intact/no aphasia\par Cranial Nerves: II-XII intact, Pupils equally round and reactive to light, ocular muscles/movements intact, no ptosis, no facial weakness, tongue protrudes normally in the midline, \par Motor: normal tone, no pronator drift, full strength in all four extremities in the proximal and distal muscle groups\par Coordination: Fine motor movements intact, rapid alternating movements intact, finger to nose intact bilaterally\par Sensory: decreased sensation in finger tips\par DTRs: 1/4 in both biceps, radialis, patellars\par Gait: slightly wide based\par . \par

## 2023-05-30 NOTE — DISCUSSION/SUMMARY
[FreeTextEntry1] : 78 year old man with a history of focal seizures with impaired awareness and history of generalization as well as underlying mild cognitive impairment.\par \par Seizures\par -Currently stable without any evidence of recent seizures.\par -continue Keppra 500 mg in the AM and 1000 mg at night\par -Continue Vimpat 100 mg BID\par -Check levels when he has blood tests next.\par Refills sent for both for 90 day supplies. \par \par Patient was in a rush to leave to catch the bus. Did not have a chance to discuss SUDEP.\par \par Gait instability\par -Multifactorial\par -Peripheral neuropathy\par -History of severe myelopathy and central cord syndrome s/p urgent anterior cervical decompression and fusion from C4-6 in 2017.\par \par Neck Pain\par -He follows with Dr. Alvarez.\par -Can continue gabapentin - currently prescribed 300 mg TID.\par \par B/L Hand numbness\par -EMG/NCV showed moderate b/l CTS, b/l chronic C6, C7, C8 radiculopathies and was suggestive of neuropathy\par -He was referred for NCV/EMG by Dr. Alvarez.\par -Wrist splints prescribed\par \par Sleep Disturbance\par -Polysomnogram in December 2021 showed mild YONI with AHI of 6.7. The non-supine AHI was 2 and the supine AHI was 20.\par -PLM Index was 20 but the arousal index was 1.\par -B12 and ferritin were normal. \par -He has previously refused CPAP.\par \par Patient was in a rush to leave to catch his bus. He will be called tomorrow to arrange follow-up.\par f/u ~ 6 months, sooner if needed.\par  \par

## 2023-05-30 NOTE — CONSULT LETTER
[Dear  ___] : Dear  [unfilled], [Consult Letter:] : I had the pleasure of evaluating your patient, [unfilled]. [Please see my note below.] : Please see my note below. [Consult Closing:] : Thank you very much for allowing me to participate in the care of this patient.  If you have any questions, please do not hesitate to contact me. [FreeTextEntry3] : Sincerely,\par \par \par Senia Varghese MD\par Diplomate, American Academy of Psychiatry and Neurology\par Board Certified in the Subspecialty of Clinical Neurophysiology\par Board Certified in the Subspecialty of Sleep Medicine\par Board Certified in the Subspecialty of Epilepsy\par

## 2023-05-30 NOTE — DATA REVIEWED
[de-identified] : EEG 1/22/21: Diffuse background slowing [de-identified] : MRI cervical spine 7/22/22:\par \par Trace osseous stress reaction about the left C3/C4 facet joint with a trace joint effusion which is new since the prior examination.\par \par Redemonstration of C4-C6 anterior cervical discectomy and fusion. Otherwise grossly unchanged multilevel cervical spondylosis as outlined above. Findings are again most prominent at the level of C3/C4 where there is unchanged near-complete circumferential effacement of the CSF column with contacting the cord.\par \par Unchanged myelomalacia within the cervical cord at the level of C5.\par \par NCV/EMG 2/1/23: \par 1. b/l chronic C6, C7, C8 radiculopathies\par 2. Moderate b/l sensorimotor median nerve neuropathy at the wrist consistent with carpal tunnel\par 3. reduced latencies and amplitude of b/l sensory ulnar neruves suggest the presence of an underlying neuropathy.

## 2023-07-05 ENCOUNTER — ASC (OUTPATIENT)
Dept: URBAN - METROPOLITAN AREA SURGERY 8 | Facility: SURGERY | Age: 78
Setting detail: OPHTHALMOLOGY
End: 2023-07-05
Payer: MEDICARE

## 2023-07-05 DIAGNOSIS — E11.3312: ICD-10-CM

## 2023-07-05 PROCEDURE — 67210 TREATMENT OF RETINAL LESION: CPT | Performed by: OPHTHALMOLOGY

## 2023-07-05 ASSESSMENT — TONOMETRY
OD_IOP_MMHG: 10
OS_IOP_MMHG: 12

## 2023-07-05 ASSESSMENT — SPHEQUIV_DERIVED
OS_SPHEQUIV: -3
OD_SPHEQUIV: -2.375

## 2023-07-05 ASSESSMENT — KERATOMETRY
OS_AXISANGLE_DEGREES: 176
OD_AXISANGLE_DEGREES: 001
METHOD_AUTO_MANUAL: AUTO
OD_K2POWER_DIOPTERS: 45.00
OS_K2POWER_DIOPTERS: 45.25
OS_K1POWER_DIOPTERS: 42.50
OD_K1POWER_DIOPTERS: 42.50

## 2023-07-05 ASSESSMENT — DECREASING TEAR LAKE - SEVERITY SCORE
OS_DEC_TEARLAKE: T
OD_DEC_TEARLAKE: T

## 2023-07-05 ASSESSMENT — REFRACTION_AUTOREFRACTION
OS_AXIS: 087
OD_AXIS: 095
OS_SPHERE: -1.50
OD_CYLINDER: -2.75
OS_CYLINDER: -3.00
OD_SPHERE: -1.00

## 2023-07-05 ASSESSMENT — AXIALLENGTH_DERIVED
OD_AL: 24.4559
OS_AL: 24.6701

## 2023-07-05 ASSESSMENT — CONFRONTATIONAL VISUAL FIELD TEST (CVF)
OS_FINDINGS: FULL
OD_FINDINGS: FULL

## 2023-07-05 ASSESSMENT — VISUAL ACUITY
OS_BCVA: 20/60
OD_BCVA: 20/25-1

## 2023-08-08 ENCOUNTER — OFFICE (OUTPATIENT)
Dept: URBAN - METROPOLITAN AREA CLINIC 94 | Facility: CLINIC | Age: 78
Setting detail: OPHTHALMOLOGY
End: 2023-08-08
Payer: MEDICARE

## 2023-08-08 DIAGNOSIS — E11.3313: ICD-10-CM

## 2023-08-08 DIAGNOSIS — E11.3312: ICD-10-CM

## 2023-08-08 DIAGNOSIS — H34.8110: ICD-10-CM

## 2023-08-08 PROCEDURE — 67028 INJECTION EYE DRUG: CPT | Performed by: OPHTHALMOLOGY

## 2023-08-08 PROCEDURE — 99024 POSTOP FOLLOW-UP VISIT: CPT | Performed by: OPHTHALMOLOGY

## 2023-08-08 PROCEDURE — 92134 CPTRZ OPH DX IMG PST SGM RTA: CPT | Performed by: OPHTHALMOLOGY

## 2023-08-08 ASSESSMENT — REFRACTION_AUTOREFRACTION
OD_CYLINDER: -2.75
OS_SPHERE: -1.50
OD_AXIS: 095
OD_SPHERE: -1.00
OS_AXIS: 087
OS_CYLINDER: -3.00

## 2023-08-08 ASSESSMENT — KERATOMETRY
OS_K1POWER_DIOPTERS: 42.50
OD_AXISANGLE_DEGREES: 001
OS_AXISANGLE_DEGREES: 176
OS_K2POWER_DIOPTERS: 45.25
METHOD_AUTO_MANUAL: AUTO
OD_K2POWER_DIOPTERS: 45.00
OD_K1POWER_DIOPTERS: 42.50

## 2023-08-08 ASSESSMENT — DECREASING TEAR LAKE - SEVERITY SCORE
OD_DEC_TEARLAKE: T
OS_DEC_TEARLAKE: T

## 2023-08-08 ASSESSMENT — AXIALLENGTH_DERIVED
OS_AL: 24.6701
OD_AL: 24.4559

## 2023-08-08 ASSESSMENT — VISUAL ACUITY
OS_BCVA: 20/80
OD_BCVA: 20/30+1

## 2023-08-08 ASSESSMENT — TONOMETRY: OS_IOP_MMHG: 15

## 2023-08-08 ASSESSMENT — SPHEQUIV_DERIVED
OD_SPHEQUIV: -2.375
OS_SPHEQUIV: -3

## 2023-08-11 ENCOUNTER — OUTPATIENT (OUTPATIENT)
Dept: OUTPATIENT SERVICES | Facility: HOSPITAL | Age: 78
LOS: 1 days | End: 2023-08-11
Payer: MEDICARE

## 2023-08-11 DIAGNOSIS — K21.9 GASTRO-ESOPHAGEAL REFLUX DISEASE WITHOUT ESOPHAGITIS: ICD-10-CM

## 2023-08-11 DIAGNOSIS — Z98.89 OTHER SPECIFIED POSTPROCEDURAL STATES: Chronic | ICD-10-CM

## 2023-08-11 DIAGNOSIS — R13.19 OTHER DYSPHAGIA: ICD-10-CM

## 2023-08-11 PROCEDURE — 74220 X-RAY XM ESOPHAGUS 1CNTRST: CPT

## 2023-08-11 PROCEDURE — 74220 X-RAY XM ESOPHAGUS 1CNTRST: CPT | Mod: 26

## 2023-08-12 DIAGNOSIS — R13.19 OTHER DYSPHAGIA: ICD-10-CM

## 2023-08-12 DIAGNOSIS — K21.9 GASTRO-ESOPHAGEAL REFLUX DISEASE WITHOUT ESOPHAGITIS: ICD-10-CM

## 2023-08-29 ENCOUNTER — ASC (OUTPATIENT)
Dept: URBAN - METROPOLITAN AREA SURGERY 8 | Facility: SURGERY | Age: 78
Setting detail: OPHTHALMOLOGY
End: 2023-08-29
Payer: MEDICARE

## 2023-08-29 DIAGNOSIS — E11.3311: ICD-10-CM

## 2023-08-29 PROBLEM — E11.3312 DM TYPE 2; RIGHT MOD WITH ME, LEFT MOD WITH ME: Status: ACTIVE | Noted: 2023-08-08

## 2023-08-29 PROBLEM — E11.3313 DM TYPE 2; RIGHT MOD WITH ME, LEFT MOD WITH ME: Status: ACTIVE | Noted: 2023-08-08

## 2023-08-29 PROCEDURE — 67210 TREATMENT OF RETINAL LESION: CPT | Performed by: OPHTHALMOLOGY

## 2023-08-29 ASSESSMENT — SPHEQUIV_DERIVED
OD_SPHEQUIV: -2.375
OS_SPHEQUIV: -3

## 2023-08-29 ASSESSMENT — REFRACTION_AUTOREFRACTION
OS_SPHERE: -1.50
OS_CYLINDER: -3.00
OD_AXIS: 095
OD_CYLINDER: -2.75
OD_SPHERE: -1.00
OS_AXIS: 087

## 2023-08-29 ASSESSMENT — KERATOMETRY
OS_K1POWER_DIOPTERS: 42.50
OD_K2POWER_DIOPTERS: 45.00
METHOD_AUTO_MANUAL: AUTO
OD_K1POWER_DIOPTERS: 42.50
OD_AXISANGLE_DEGREES: 001
OS_K2POWER_DIOPTERS: 45.25
OS_AXISANGLE_DEGREES: 176

## 2023-08-29 ASSESSMENT — CONFRONTATIONAL VISUAL FIELD TEST (CVF)
OD_FINDINGS: FULL
OS_FINDINGS: FULL

## 2023-08-29 ASSESSMENT — TONOMETRY
OD_IOP_MMHG: 12
OS_IOP_MMHG: 12

## 2023-08-29 ASSESSMENT — VISUAL ACUITY
OD_BCVA: 20/20-1
OS_BCVA: 20/60-1

## 2023-08-29 ASSESSMENT — AXIALLENGTH_DERIVED
OS_AL: 24.6701
OD_AL: 24.4559

## 2023-08-29 ASSESSMENT — DECREASING TEAR LAKE - SEVERITY SCORE
OD_DEC_TEARLAKE: T
OS_DEC_TEARLAKE: T

## 2023-10-03 ENCOUNTER — OFFICE (OUTPATIENT)
Dept: URBAN - METROPOLITAN AREA CLINIC 94 | Facility: CLINIC | Age: 78
Setting detail: OPHTHALMOLOGY
End: 2023-10-03
Payer: MEDICARE

## 2023-10-03 DIAGNOSIS — E11.3313: ICD-10-CM

## 2023-10-03 PROCEDURE — 92134 CPTRZ OPH DX IMG PST SGM RTA: CPT | Performed by: OPHTHALMOLOGY

## 2023-10-03 PROCEDURE — 67028 INJECTION EYE DRUG: CPT | Mod: 58,50 | Performed by: OPHTHALMOLOGY

## 2023-10-03 ASSESSMENT — KERATOMETRY
OD_AXISANGLE_DEGREES: 001
METHOD_AUTO_MANUAL: AUTO
OS_K2POWER_DIOPTERS: 45.25
OD_K2POWER_DIOPTERS: 45.00
OS_AXISANGLE_DEGREES: 176
OD_K1POWER_DIOPTERS: 42.50
OS_K1POWER_DIOPTERS: 42.50

## 2023-10-03 ASSESSMENT — REFRACTION_AUTOREFRACTION
OD_AXIS: 095
OS_CYLINDER: -3.00
OS_AXIS: 087
OD_SPHERE: -1.00
OS_SPHERE: -1.50
OD_CYLINDER: -2.75

## 2023-10-03 ASSESSMENT — CONFRONTATIONAL VISUAL FIELD TEST (CVF)
OS_FINDINGS: FULL
OD_FINDINGS: FULL

## 2023-10-03 ASSESSMENT — SPHEQUIV_DERIVED
OS_SPHEQUIV: -3
OD_SPHEQUIV: -2.375

## 2023-10-03 ASSESSMENT — VISUAL ACUITY
OS_BCVA: 20/60-1
OD_BCVA: 20/20

## 2023-10-03 ASSESSMENT — TONOMETRY
OS_IOP_MMHG: 15
OD_IOP_MMHG: 15

## 2023-10-03 ASSESSMENT — AXIALLENGTH_DERIVED
OS_AL: 24.6701
OD_AL: 24.4559

## 2023-10-03 ASSESSMENT — DECREASING TEAR LAKE - SEVERITY SCORE
OD_DEC_TEARLAKE: T
OS_DEC_TEARLAKE: T

## 2023-10-12 ENCOUNTER — APPOINTMENT (OUTPATIENT)
Dept: NEUROLOGY | Facility: CLINIC | Age: 78
End: 2023-10-12
Payer: MEDICARE

## 2023-10-12 VITALS
BODY MASS INDEX: 28.35 KG/M2 | TEMPERATURE: 97.8 F | HEIGHT: 70 IN | HEART RATE: 64 BPM | WEIGHT: 198 LBS | SYSTOLIC BLOOD PRESSURE: 125 MMHG | DIASTOLIC BLOOD PRESSURE: 75 MMHG

## 2023-10-12 DIAGNOSIS — G40.209 LOCALIZATION-RELATED (FOCAL) (PARTIAL) SYMPTOMATIC EPILEPSY AND EPILEPTIC SYNDROMES WITH COMPLEX PARTIAL SEIZURES, NOT INTRACTABLE, W/OUT STATUS EPILEPTICUS: ICD-10-CM

## 2023-10-12 DIAGNOSIS — G47.00 INSOMNIA, UNSPECIFIED: ICD-10-CM

## 2023-10-12 PROCEDURE — 99213 OFFICE O/P EST LOW 20 MIN: CPT

## 2023-10-12 RX ORDER — GABAPENTIN 300 MG/1
300 CAPSULE ORAL
Qty: 270 | Refills: 3 | Status: ACTIVE | COMMUNITY
Start: 2023-04-07 | End: 1900-01-01

## 2023-10-12 RX ORDER — LEVETIRACETAM 500 MG/1
500 TABLET, FILM COATED ORAL
Qty: 270 | Refills: 1 | Status: ACTIVE | COMMUNITY
Start: 2022-05-19 | End: 1900-01-01

## 2023-10-24 ENCOUNTER — ASC (OUTPATIENT)
Dept: URBAN - METROPOLITAN AREA SURGERY 8 | Facility: SURGERY | Age: 78
Setting detail: OPHTHALMOLOGY
End: 2023-10-24
Payer: MEDICARE

## 2023-10-24 DIAGNOSIS — E11.3312: ICD-10-CM

## 2023-10-24 PROCEDURE — 67210 TREATMENT OF RETINAL LESION: CPT | Mod: 79,LT | Performed by: OPHTHALMOLOGY

## 2023-10-24 ASSESSMENT — SPHEQUIV_DERIVED
OD_SPHEQUIV: -2.375
OS_SPHEQUIV: -3

## 2023-10-24 ASSESSMENT — VISUAL ACUITY
OS_BCVA: 20/70
OD_BCVA: 20/20

## 2023-10-24 ASSESSMENT — REFRACTION_AUTOREFRACTION
OD_SPHERE: -1.00
OD_CYLINDER: -2.75
OS_AXIS: 087
OS_CYLINDER: -3.00
OS_SPHERE: -1.50
OD_AXIS: 095

## 2023-10-24 ASSESSMENT — KERATOMETRY
OD_K2POWER_DIOPTERS: 45.00
OS_K2POWER_DIOPTERS: 45.25
OS_K1POWER_DIOPTERS: 42.50
OD_K1POWER_DIOPTERS: 42.50
OD_AXISANGLE_DEGREES: 001
METHOD_AUTO_MANUAL: AUTO
OS_AXISANGLE_DEGREES: 176

## 2023-10-24 ASSESSMENT — DECREASING TEAR LAKE - SEVERITY SCORE
OD_DEC_TEARLAKE: T
OS_DEC_TEARLAKE: T

## 2023-10-24 ASSESSMENT — CONFRONTATIONAL VISUAL FIELD TEST (CVF)
OS_FINDINGS: FULL
OD_FINDINGS: FULL

## 2023-10-24 ASSESSMENT — AXIALLENGTH_DERIVED
OS_AL: 24.6701
OD_AL: 24.4559

## 2023-10-24 ASSESSMENT — TONOMETRY
OS_IOP_MMHG: 12
OD_IOP_MMHG: 10

## 2023-11-16 ENCOUNTER — NON-APPOINTMENT (OUTPATIENT)
Age: 78
End: 2023-11-16

## 2023-11-16 LAB — LACOSAMIDE (VIMPAT): <0.5 UG/ML

## 2023-11-17 LAB — LEVETIRACETAM SERPL-MCNC: 32.4 UG/ML

## 2023-11-27 NOTE — H&P ADULT - NSHPPOASURGSITEINCISION_GEN_ALL_CORE
Left femoral artery angiogram was performed  using a KIT INTRO 4FR 21GA 10CM 7CM .018IN 40CM STIFFEN DIL GW COIL by hand injection.  The access site was deemed to be appropriate for closure. no

## 2023-12-05 ENCOUNTER — ASC (OUTPATIENT)
Dept: URBAN - METROPOLITAN AREA SURGERY 8 | Facility: SURGERY | Age: 78
Setting detail: OPHTHALMOLOGY
End: 2023-12-05
Payer: MEDICARE

## 2023-12-05 DIAGNOSIS — E11.3311: ICD-10-CM

## 2023-12-05 DIAGNOSIS — E11.3312: ICD-10-CM

## 2023-12-05 PROCEDURE — 67210 TREATMENT OF RETINAL LESION: CPT | Mod: 79,RT | Performed by: OPHTHALMOLOGY

## 2023-12-05 PROCEDURE — 99024 POSTOP FOLLOW-UP VISIT: CPT | Performed by: OPHTHALMOLOGY

## 2023-12-05 ASSESSMENT — REFRACTION_AUTOREFRACTION
OS_SPHERE: -1.50
OD_SPHERE: -1.00
OS_CYLINDER: -3.00
OD_CYLINDER: -2.75
OS_AXIS: 087
OD_AXIS: 095

## 2023-12-05 ASSESSMENT — SPHEQUIV_DERIVED
OD_SPHEQUIV: -2.375
OS_SPHEQUIV: -3

## 2023-12-05 ASSESSMENT — CONFRONTATIONAL VISUAL FIELD TEST (CVF)
OS_FINDINGS: FULL
OD_FINDINGS: FULL

## 2024-01-01 NOTE — ED ADULT TRIAGE NOTE - CHIEF COMPLAINT QUOTE
pt had eyes dilated at opthamologist office, pt was walking to [facility], found face down on side walk in the rain ,
Verbal/written post procedure instructions were given to patient/caregiver

## 2024-01-16 ENCOUNTER — OFFICE (OUTPATIENT)
Dept: URBAN - METROPOLITAN AREA CLINIC 94 | Facility: CLINIC | Age: 79
Setting detail: OPHTHALMOLOGY
End: 2024-01-16
Payer: MEDICARE

## 2024-01-16 DIAGNOSIS — E11.3313: ICD-10-CM

## 2024-01-16 DIAGNOSIS — H35.033: ICD-10-CM

## 2024-01-16 PROCEDURE — 67028 INJECTION EYE DRUG: CPT | Mod: 58,50 | Performed by: OPHTHALMOLOGY

## 2024-01-16 PROCEDURE — 92134 CPTRZ OPH DX IMG PST SGM RTA: CPT | Performed by: OPHTHALMOLOGY

## 2024-01-16 ASSESSMENT — REFRACTION_AUTOREFRACTION
OD_AXIS: 095
OS_AXIS: 087
OD_CYLINDER: -2.75
OD_SPHERE: -1.00
OS_CYLINDER: -3.00
OS_SPHERE: -1.50

## 2024-01-16 ASSESSMENT — CONFRONTATIONAL VISUAL FIELD TEST (CVF)
OD_FINDINGS: FULL
OS_FINDINGS: FULL

## 2024-01-16 ASSESSMENT — DECREASING TEAR LAKE - SEVERITY SCORE
OD_DEC_TEARLAKE: T
OS_DEC_TEARLAKE: T

## 2024-01-16 ASSESSMENT — SPHEQUIV_DERIVED
OS_SPHEQUIV: -3
OD_SPHEQUIV: -2.375

## 2024-01-31 ENCOUNTER — ASC (OUTPATIENT)
Dept: URBAN - METROPOLITAN AREA SURGERY 8 | Facility: SURGERY | Age: 79
Setting detail: OPHTHALMOLOGY
End: 2024-01-31
Payer: MEDICARE

## 2024-01-31 DIAGNOSIS — E11.3312: ICD-10-CM

## 2024-01-31 PROCEDURE — 67210 TREATMENT OF RETINAL LESION: CPT | Mod: 79,LT | Performed by: OPHTHALMOLOGY

## 2024-01-31 ASSESSMENT — CONFRONTATIONAL VISUAL FIELD TEST (CVF)
OD_FINDINGS: FULL
OS_FINDINGS: FULL

## 2024-03-12 ENCOUNTER — ASC (OUTPATIENT)
Dept: URBAN - METROPOLITAN AREA SURGERY 8 | Facility: SURGERY | Age: 79
Setting detail: OPHTHALMOLOGY
End: 2024-03-12
Payer: MEDICARE

## 2024-03-12 DIAGNOSIS — E11.3311: ICD-10-CM

## 2024-03-12 PROCEDURE — 67210 TREATMENT OF RETINAL LESION: CPT | Mod: 79,RT | Performed by: OPHTHALMOLOGY

## 2024-03-27 NOTE — ED ADULT TRIAGE NOTE - AS O2 DELIVERY
Received cardiac clearance request from Dr. Monica Puentes stating pt has excision lipoma skin lesion on back scheduled for 04/10/2024 and is requiring a cardiac clearance. Placed cardiac clearance request in Anitra's inbox to review and address with provider.      room air

## 2024-04-15 RX ORDER — LACOSAMIDE 100 MG/1
100 TABLET ORAL
Qty: 180 | Refills: 1 | Status: ACTIVE | COMMUNITY
Start: 2017-07-17 | End: 1900-01-01

## 2024-04-16 ENCOUNTER — OFFICE (OUTPATIENT)
Dept: URBAN - METROPOLITAN AREA CLINIC 94 | Facility: CLINIC | Age: 79
Setting detail: OPHTHALMOLOGY
End: 2024-04-16
Payer: MEDICARE

## 2024-04-16 DIAGNOSIS — E11.3313: ICD-10-CM

## 2024-04-16 DIAGNOSIS — H35.033: ICD-10-CM

## 2024-04-16 PROCEDURE — 67028 INJECTION EYE DRUG: CPT | Mod: 58,50 | Performed by: OPHTHALMOLOGY

## 2024-04-16 PROCEDURE — 92134 CPTRZ OPH DX IMG PST SGM RTA: CPT | Performed by: OPHTHALMOLOGY

## 2024-04-19 NOTE — H&P ADULT - NSHPPHYSICALEXAM_GEN_ALL_CORE
4/23/24  arrival time per  office  0800    HOLD all diabetic medications the morning of surgery as ordered by physician.    Please discontinue all blood thinners and anticoagulants (except aspirin) prior to surgery as per your surgeon and cardiologist instructions.  Aspirin may be continued up to the day prior to surgery.     CHLORHEXIDINE CLOTHS GIVEN WITH INSTRUCTIONS AND FORM TO RETURN TO HOSPITAL, IF APPLICABLE.    General Instructions:  Do not eat or drink after midnight:4/22/24 includes water, mints, or gum. You may brush your teeth.  Dental appliances that are removable must be taken out day of surgery.  Do not smoke, chew tobacco, or drink alcohol 24 hours prior to surgery.  Bring medications in original bottles, any inhalers and if applicable your C-PAP/BI-PAP machine.  Bring any papers given to you in the doctor's office.  Wear clean comfortable clothes and socks.  Do not wear contact lenses or make-up. Bring a case for your glasses if applicable.  Bring crutches or walker if applicable.  Leave all other valuables and jewelry at home.    If you were given a blood bank ID arm band remember to bring it with you the day of surgery.    Preventing a Surgical Site Infection:  Shower the night before surgery (unless instructed other wise) using a fresh bar of anti-bacterial soap (such as Dial) and clean washcloth. Dry with a clean towel and dress in clean clothing.  For 2 to 3 days before surgery, avoid shaving with a razor near where you will have surgery because the razor can irritate skin and make it easier to develop an infection. Ask your surgeon if you will be receiving antibiotics prior to surgery.  Make sure you, your family, and all healthcare providers clear their hands with soap and water or an alcohol-based hand  before caring for you or your wound.  If at all possible, quit smoking as many days before surgery as you can.    Day of surgery:  Upon arrival, a Pre-op nurse and Anesthesiologist  will review your health history, obtain vital signs, and answer questions you may have. The only belongings needed at this time will be your home medications and if applicable your C-PAP/BI-PAP machine. If you are staying overnight your family can leave the rest of your belongings in the car and bring them to your room later. A Pre-op nurse will start an IV and you may receive medication in preparation for surgery, including something to help you relax. Your family will be able to see you in the Pre-op area. While you are in surgery your family should notify the waiting room  if they leave the waiting room area and provide a contact phone number.    Please be aware that surgery does come with discomfort. We want to make every effort to control your discomfort so please discuss any uncontrolled symptoms with your nurse. Your doctor will most likely have prescribed pain medications.    If you are going home after surgery you will receive individualized written care instructions before being discharged. A responsible adult must drive you to and from the hospital on the day of surgery and stay with you for 24 hours.    If you are staying overnight following surgery, you will be transported to your hospital room following the recovery period.  [facility] has all private rooms.    If you have any questions please call Pre-Admission Testing at [phone].  Deductibles and co-payments are collected on the day of service. Please be prepared to pay the required co-pay, deductible or deposit on the day of service as defined by your plan.    A RESPONSIBLE PERSON MUST REMAIN IN THE WAITING ROOM DURING YOUR PROCEDURE AND A RESPONSIBLE  MUST BE AVAILABLE UPON YOUR DISCHARGE.     GCS of 15  Airway is patent  Breathing is symmetric and unlabored  CNII-XII grossly intact  HEENT: Normocephalic, (+) ecchymosis to right periorbital region, abrasion to nasal region, right pupil dilated secondary to opthamology 8/7/14, left pupil is reactive, EOM wnl, no otorrhea or hemotympanum b/l, no epistaxis or d/c b/l nares, no craniofacial bony pathology or tenderness b/l  Neck: Pt in hard cervical collar at time of exam. No crepitus, no ecchymosis, no hematoma, to exam, no JVD, no tracheal deviation  Cspine/thoracolumbrosacral spine: no gross bony pathology or tenderness to exam  Cardiovascular: S1S2 Present  Chest: no gross rib pathology or tenderness to exam. No sternal pathology or tenderness to exam. No crepitus, no ecchymosis, no hematoma. No penetrating thorcoabdominal trauma  Respiratory: Rate is 18; Respiratory Effort normal; no wheezes, rales or rhonchi to exam  ABD: bowel sounds (+), soft, nontender, non distended, no rebound, no guarding, no rigidity, no skin changes to exam. No pelvic instability to exam, no skin changes  Genitourinary: No scrotal/perineal/perirectal hematoma/ecchymosis/tenderness to exam  External genitalia: normal, no blood at urethral meatus  Musculoskeletal: Pt has palpable b/l radial, femoral, dorsalis pedis pulses. All digits are warm and well perfused. No gross long bone pathology or tenderness to exam. Pt demonstrates diminished right upper>left upper ext strength 3/5, left lower ext strength of 4/5. Pt has good capillary refill to digits, no calf edema or tenderness to exam.  Skin: no lesions or rashes to exam

## 2024-04-23 ENCOUNTER — APPOINTMENT (OUTPATIENT)
Dept: DERMATOLOGY | Facility: CLINIC | Age: 79
End: 2024-04-23
Payer: MEDICARE

## 2024-04-23 ENCOUNTER — NON-APPOINTMENT (OUTPATIENT)
Age: 79
End: 2024-04-23

## 2024-04-23 DIAGNOSIS — L29.9 PRURITUS, UNSPECIFIED: ICD-10-CM

## 2024-04-23 DIAGNOSIS — B35.4 TINEA CORPORIS: ICD-10-CM

## 2024-04-23 PROCEDURE — 11900 INJECT SKIN LESIONS </W 7: CPT | Mod: 59

## 2024-04-23 PROCEDURE — 10160 PNXR ASPIR ABSC HMTMA BULLA: CPT | Mod: 59

## 2024-04-23 PROCEDURE — 99204 OFFICE O/P NEW MOD 45 MIN: CPT | Mod: 25

## 2024-04-23 RX ORDER — DOXYCYCLINE HYCLATE 100 MG/1
100 TABLET ORAL
Qty: 28 | Refills: 3 | Status: ACTIVE | COMMUNITY
Start: 2024-04-23 | End: 1900-01-01

## 2024-04-23 NOTE — ASSESSMENT
[FreeTextEntry1] : inflamed EIC with pruritus doxy 100 mg PO BID x2 weeks;' SED Risks and benefits were discussed including including atrophy, discoloration Intralesional triamcinolone, concentration 2.5 mg/cc; Total volume  0.1    cc Sites: 1 aspiration I&D; 1 cc drained; SED  NUB; left inner arm plan for excision, likely EIC, painful to patient and growing  feet; rash lotrimin PRN; SED f/u with podiatry

## 2024-04-23 NOTE — PHYSICAL EXAM
[FreeTextEntry3] : AAOx3, pleasant, NAD, no visual lymphadenopathy hair, scalp, face, nose, eyelids, ears, lips, oropharynx, neck, chest, abdomen, back, right arm, left arm, nails, and hands examined with all normal findings, pertinent findings include:  large cystic nodule on upper back EIC on left inner arm erythema on feet

## 2024-04-23 NOTE — HISTORY OF PRESENT ILLNESS
[FreeTextEntry1] : spot on back [de-identified] : 78 year old male. spot on back. itchy. no tx tried. also rash on feet.

## 2024-05-08 ENCOUNTER — APPOINTMENT (OUTPATIENT)
Dept: UROLOGY | Facility: CLINIC | Age: 79
End: 2024-05-08
Payer: MEDICARE

## 2024-05-08 VITALS
HEIGHT: 70 IN | BODY MASS INDEX: 27.92 KG/M2 | OXYGEN SATURATION: 97 % | HEART RATE: 56 BPM | WEIGHT: 195 LBS | DIASTOLIC BLOOD PRESSURE: 80 MMHG | SYSTOLIC BLOOD PRESSURE: 152 MMHG | TEMPERATURE: 97.9 F | RESPIRATION RATE: 14 BRPM

## 2024-05-08 DIAGNOSIS — N40.0 BENIGN PROSTATIC HYPERPLASIA WITHOUT LOWER URINARY TRACT SYMPMS: ICD-10-CM

## 2024-05-08 PROCEDURE — 99212 OFFICE O/P EST SF 10 MIN: CPT

## 2024-05-08 NOTE — HISTORY OF PRESENT ILLNESS
[FreeTextEntry1] : This patient is here for a checkup.  He has no new complaints but does need a refill on his medication

## 2024-05-09 ENCOUNTER — NON-APPOINTMENT (OUTPATIENT)
Age: 79
End: 2024-05-09

## 2024-05-09 LAB
APPEARANCE: CLEAR
BACTERIA: NEGATIVE /HPF
BILIRUBIN URINE: NEGATIVE
BLOOD URINE: NEGATIVE
CAST: 3 /LPF
COLOR: YELLOW
EPITHELIAL CELLS: 1 /HPF
GLUCOSE QUALITATIVE U: NEGATIVE MG/DL
KETONES URINE: NEGATIVE MG/DL
LEUKOCYTE ESTERASE URINE: NEGATIVE
MICROSCOPIC-UA: NORMAL
NITRITE URINE: NEGATIVE
PH URINE: 5.5
PROTEIN URINE: 100 MG/DL
PSA SERPL-MCNC: 0.94 NG/ML
RED BLOOD CELLS URINE: 0 /HPF
SPECIFIC GRAVITY URINE: 1.01
UROBILINOGEN URINE: 0.2 MG/DL
WHITE BLOOD CELLS URINE: 0 /HPF

## 2024-05-13 LAB — URINE CYTOLOGY: NORMAL

## 2024-05-13 RX ORDER — MIRABEGRON 25 MG/1
25 TABLET, FILM COATED, EXTENDED RELEASE ORAL
Qty: 90 | Refills: 3 | Status: ACTIVE | COMMUNITY
Start: 2021-01-08 | End: 1900-01-01

## 2024-05-14 ENCOUNTER — ASC (OUTPATIENT)
Dept: URBAN - METROPOLITAN AREA SURGERY 8 | Facility: SURGERY | Age: 79
Setting detail: OPHTHALMOLOGY
End: 2024-05-14
Payer: MEDICARE

## 2024-05-14 DIAGNOSIS — E11.3312: ICD-10-CM

## 2024-05-14 PROCEDURE — 67210 TREATMENT OF RETINAL LESION: CPT | Mod: 79,LT | Performed by: OPHTHALMOLOGY

## 2024-05-14 ASSESSMENT — CONFRONTATIONAL VISUAL FIELD TEST (CVF)
OS_FINDINGS: FULL
OD_FINDINGS: FULL

## 2024-05-15 ENCOUNTER — NON-APPOINTMENT (OUTPATIENT)
Age: 79
End: 2024-05-15

## 2024-05-23 ENCOUNTER — OFFICE (OUTPATIENT)
Dept: URBAN - METROPOLITAN AREA CLINIC 102 | Facility: CLINIC | Age: 79
Setting detail: OPHTHALMOLOGY
End: 2024-05-23
Payer: MEDICARE

## 2024-05-23 ENCOUNTER — OFFICE (OUTPATIENT)
Dept: URBAN - METROPOLITAN AREA CLINIC 102 | Facility: CLINIC | Age: 79
Setting detail: OPHTHALMOLOGY
End: 2024-05-23

## 2024-05-23 DIAGNOSIS — H16.223: ICD-10-CM

## 2024-05-23 DIAGNOSIS — H43.393: ICD-10-CM

## 2024-05-23 DIAGNOSIS — E11.3311: ICD-10-CM

## 2024-05-23 DIAGNOSIS — E11.3312: ICD-10-CM

## 2024-05-23 DIAGNOSIS — Z96.1: ICD-10-CM

## 2024-05-23 DIAGNOSIS — H52.4: ICD-10-CM

## 2024-05-23 DIAGNOSIS — H34.8110: ICD-10-CM

## 2024-05-23 DIAGNOSIS — H35.033: ICD-10-CM

## 2024-05-23 DIAGNOSIS — H26.493: ICD-10-CM

## 2024-05-23 DIAGNOSIS — E11.3313: ICD-10-CM

## 2024-05-23 DIAGNOSIS — H90.3: ICD-10-CM

## 2024-05-23 PROCEDURE — CANCEL CANCEL: Performed by: AUDIOLOGIST

## 2024-05-23 PROCEDURE — 92015 DETERMINE REFRACTIVE STATE: CPT | Performed by: OPHTHALMOLOGY

## 2024-05-23 PROCEDURE — 99024 POSTOP FOLLOW-UP VISIT: CPT | Performed by: OPHTHALMOLOGY

## 2024-05-23 ASSESSMENT — CONFRONTATIONAL VISUAL FIELD TEST (CVF)
OS_FINDINGS: FULL
OD_FINDINGS: FULL

## 2024-05-28 ENCOUNTER — APPOINTMENT (OUTPATIENT)
Dept: DERMATOLOGY | Facility: HOSPITAL | Age: 79
End: 2024-05-28

## 2024-05-28 ENCOUNTER — APPOINTMENT (OUTPATIENT)
Dept: DERMATOLOGY | Facility: CLINIC | Age: 79
End: 2024-05-28
Payer: MEDICARE

## 2024-05-28 DIAGNOSIS — D48.5 NEOPLASM OF UNCERTAIN BEHAVIOR OF SKIN: ICD-10-CM

## 2024-05-28 DIAGNOSIS — R21 RASH AND OTHER NONSPECIFIC SKIN ERUPTION: ICD-10-CM

## 2024-05-28 DIAGNOSIS — D23.9 OTHER BENIGN NEOPLASM OF SKIN, UNSPECIFIED: ICD-10-CM

## 2024-05-28 PROCEDURE — 11402 EXC TR-EXT B9+MARG 1.1-2 CM: CPT

## 2024-05-28 PROCEDURE — 99214 OFFICE O/P EST MOD 30 MIN: CPT | Mod: 25

## 2024-05-28 PROCEDURE — 12031 INTMD RPR S/A/T/EXT 2.5 CM/<: CPT

## 2024-05-28 RX ORDER — TRIAMCINOLONE ACETONIDE 1 MG/G
0.1 OINTMENT TOPICAL TWICE DAILY
Qty: 1 | Refills: 9 | Status: ACTIVE | COMMUNITY
Start: 2024-05-28 | End: 1900-01-01

## 2024-05-28 NOTE — HISTORY OF PRESENT ILLNESS
[FreeTextEntry1] : growth [de-identified] : 79 year old. growth left inner arm. painful.  rash on legs. itchy. spot on back.

## 2024-05-28 NOTE — ASSESSMENT
[FreeTextEntry1] : 1) benign findings as above- education  2) rash arthropod TAC BID PRN; SED  3) excision as above  f/u s/r

## 2024-05-28 NOTE — PROCEDURE
[___ cm] : [unfilled] cm [___ ml of 1% lidocaine w/ 1:100,000 epinephrine] : [unfilled] ml of 1% lidocaine with 1:100,000 epinephrine [Pressure] : pressure [4-0] : 4-0 Prolene [____ cm] : [unfilled] cm [____ days] : [unfilled] days [FreeTextEntry1] : Jose Angel Ricardo [FreeTextEntry7] : left upper inner arm [TWNoteComboBox1] : Epidermal Inclusion Cyst [TWNoteComboBox4] : Epidermal Inclusion Cyst [TWNoteComboBox3] : Subcutaneous fat [TWNoteComboBox5] : Subcutaneous fat

## 2024-05-29 NOTE — ED STATDOCS - HEME/LYMPH NEGATIVE STATEMENT, MLM
----- Message from Amelia Austin sent at 5/29/2024  7:22 AM CDT -----  2nd REMINDER: An FMLA/Disability form was recently sent to your office for review and signature.    Please complete, sign and fax to [phone]  as soon as possible.      Thank you,   Forms Completion Team.        no anemia, no easy bruising, no jaundice, no swollen lymph nodes.

## 2024-06-10 ENCOUNTER — APPOINTMENT (OUTPATIENT)
Dept: DERMATOLOGY | Facility: CLINIC | Age: 79
End: 2024-06-10
Payer: MEDICARE

## 2024-06-10 ENCOUNTER — NON-APPOINTMENT (OUTPATIENT)
Age: 79
End: 2024-06-10

## 2024-06-10 DIAGNOSIS — L72.3 SEBACEOUS CYST: ICD-10-CM

## 2024-06-10 PROCEDURE — 10060 I&D ABSCESS SIMPLE/SINGLE: CPT

## 2024-06-10 NOTE — HISTORY OF PRESENT ILLNESS
[FreeTextEntry1] : Suture removal. [de-identified] : Left inner arm, well healed, but with erythematous firm papule.  no evidence of infection. Sutures removed.  Path confirms benign cyst. Dressed. f/u in 4-6 weeks for evaluation to see if any residual cyst.   May require excision.  Patient also c/o lesions on the back.

## 2024-06-18 ENCOUNTER — OFFICE (OUTPATIENT)
Dept: URBAN - METROPOLITAN AREA CLINIC 94 | Facility: CLINIC | Age: 79
Setting detail: OPHTHALMOLOGY
End: 2024-06-18
Payer: MEDICARE

## 2024-06-18 ENCOUNTER — ASC (OUTPATIENT)
Dept: URBAN - METROPOLITAN AREA SURGERY 8 | Facility: SURGERY | Age: 79
Setting detail: OPHTHALMOLOGY
End: 2024-06-18
Payer: MEDICARE

## 2024-06-18 DIAGNOSIS — H43.393: ICD-10-CM

## 2024-06-18 DIAGNOSIS — E11.3311: ICD-10-CM

## 2024-06-18 DIAGNOSIS — E11.3313: ICD-10-CM

## 2024-06-18 DIAGNOSIS — H34.8110: ICD-10-CM

## 2024-06-18 DIAGNOSIS — H35.033: ICD-10-CM

## 2024-06-18 PROCEDURE — 92134 CPTRZ OPH DX IMG PST SGM RTA: CPT | Performed by: OPHTHALMOLOGY

## 2024-06-18 PROCEDURE — 67210 TREATMENT OF RETINAL LESION: CPT | Mod: 79,RT | Performed by: OPHTHALMOLOGY

## 2024-06-18 ASSESSMENT — CONFRONTATIONAL VISUAL FIELD TEST (CVF)
OD_FINDINGS: FULL
OS_FINDINGS: FULL

## 2024-06-26 NOTE — PROGRESS NOTE ADULT - SUBJECTIVE AND OBJECTIVE BOX
Monitor: The problem is newly identified.  Evaluation: Reviewed recent labs/tests with the patient.  Assessment/Treatment:  Continue current treatment/monitoring regimen. and Follow up in 3 months   Patient is a 72y old  Male who presents with a chief complaint of s/p unwitnessed fall, (+) LOC, extremity weakness, 8/7/14 (07 Aug 2017 15:32)      HPI:    Pt is a 72 year old man with a PMH of Epilepsy, Diabetes, and HTN who presented to  ED after being found on the ground outside the [facility] in [place]. As per patient he got up this AM and didn't feel 100% himself, he went to the eye doctor and had is right eye dilated for an exam, after the appointment he was walking to the [facility] when he became dizzy and fell. Pt cannot recall if he had a seizure, tripped and fell, or his dizziness caused him to pass out and fall. Pt was evaluated by EMS and transported to [facility]. Pt c/o bilateral arm pain and weakness. He also c/o b/ lower ext weakness but not as pronounced as the b/l upper extremities.  Pt denies incontinence or tongue biting. Pt denies nausea or vomiting.     CT of the cervical spine done in the ED shows cord impingement is seen at C3-4 through C7-T1 on a degenerative basis.    8/8- Pt seen and examined in bed, upper extremities still weak with a burning pain although improved from yesterday, cervical collar in place, denies dizziness, headache, neck pain, or back pain, set for OR later today     8/9- POD #1, s/p ACDF C4/5 and C5/6, pt seen and examined with Dr. Alvarez on am rounds, pt c/o mild post-op neck pain, continued burning sensation b/l upper ext, but improved, Hemovac 20cc since surgery,     8/10- POD#2. Pt seen and examined, in NAD. Appears comfortable in bed, b/l UE "burning" pain significantly improved. Drsg flat and dry. Persistent b/l UE weakness unchanged        atorvastatin 20 milliGRAM(s) Oral at bedtime  enalapril 5 milliGRAM(s) Oral daily  gabapentin 100 milliGRAM(s) Oral every 8 hours  lacosamide 50 milliGRAM(s) Oral two times a day  levETIRAcetam 750 milliGRAM(s) Oral two times a day  levothyroxine 75 MICROGram(s) Oral daily  metoprolol 50 milliGRAM(s) Oral two times a day  multivitamin 1 Tablet(s) Oral daily  ondansetron Injectable 4 milliGRAM(s) IV Push every 6 hours PRN  OXcarbazepine 300 milliGRAM(s) Oral two times a day  pantoprazole    Tablet 40 milliGRAM(s) Oral before breakfast  acetaminophen   Tablet. 650 milliGRAM(s) Oral every 6 hours PRN  oxyCODONE    IR 5 milliGRAM(s) Oral every 4 hours PRN  HYDROmorphone  Injectable 1 milliGRAM(s) IV Push every 6 hours PRN  insulin lispro (HumaLOG) corrective regimen sliding scale   SubCutaneous three times a day before meals  insulin lispro (HumaLOG) corrective regimen sliding scale   SubCutaneous at bedtime  dextrose 5%. 1000 milliLiter(s) IV Continuous <Continuous>  dextrose Gel 1 Dose(s) Oral once PRN  dextrose 50% Injectable 12.5 Gram(s) IV Push once  dextrose 50% Injectable 25 Gram(s) IV Push once  dextrose 50% Injectable 25 Gram(s) IV Push once  glucagon  Injectable 1 milliGRAM(s) IntraMuscular once PRN      Vital Signs Last 24 Hrs  T(C): 36.1 (10 Aug 2017 08:48), Max: 37.2 (09 Aug 2017 21:14)  T(F): 97 (10 Aug 2017 08:48), Max: 98.9 (09 Aug 2017 21:14)  HR: 77 (10 Aug 2017 10:00) (64 - 81)  BP: 136/66 (10 Aug 2017 09:00) (109/72 - 169/75)  BP(mean): 83 (10 Aug 2017 09:00) (74 - 100)  RR: 16 (10 Aug 2017 10:00) (12 - 23)  SpO2: 98% (10 Aug 2017 09:00) (95% - 99%)                          12.0   8.9   )-----------( 183      ( 10 Aug 2017 05:39 )             33.8       08-10    140  |  106  |  18  ----------------------------<  266<H>  4.1   |  26  |  1.30    Ca    8.2<L>      10 Aug 2017 05:39        C-spine XR, 3 view: Status post anterior cervical discectomy and fusion without malalignment.       PHYSICAL EXAM:  Constitutional: awake and alert.  HEENT: PERRLA, EOMI, multiple facial lacerations, Right periorbital edema improved  Neck: Cervical collar in place, dressing clean and dry   Respiratory: Breath sounds are clear bilaterally  Cardiovascular: S1 and S2, regular rhythm  Gastrointestinal: soft, nontender  Extremities:  Mild b/l UE edeam  Musculoskeletal: no joint swelling/tenderness, no abnormal movements  Skin: No rashes    Neurological exam:  HF: A x O x 3. Appropriately interactive, normal affect, Speech fluent, No Aphasia or paraphasic errors. Naming /repetition intact   CN: PERRL, EOMI, VFF, facial sensation normal, no NLFD, tongue midline   Motor: +weakness b/l upper extremities 4-/5 grossly, biceps 4+/5, LEs grossly antigravity  Sens: continued decreased sensation to pin prick b/l upper ext, improved from prior  Reflexes: Symmetric / depressed throughout, no diaz's   Coord:  Can not assess  Gait/Balance: Not tested

## 2024-07-18 ENCOUNTER — APPOINTMENT (OUTPATIENT)
Dept: DERMATOLOGY | Facility: CLINIC | Age: 79
End: 2024-07-18
Payer: MEDICARE

## 2024-07-18 DIAGNOSIS — B35.3 TINEA PEDIS: ICD-10-CM

## 2024-07-18 DIAGNOSIS — B35.4 TINEA CORPORIS: ICD-10-CM

## 2024-07-18 PROCEDURE — 99214 OFFICE O/P EST MOD 30 MIN: CPT

## 2024-07-18 RX ORDER — VITAMIN B COMPLEX
CAPSULE ORAL
Refills: 0 | Status: ACTIVE | COMMUNITY

## 2024-09-04 ENCOUNTER — OFFICE (OUTPATIENT)
Dept: URBAN - METROPOLITAN AREA CLINIC 94 | Facility: CLINIC | Age: 79
Setting detail: OPHTHALMOLOGY
End: 2024-09-04
Payer: MEDICARE

## 2024-09-04 DIAGNOSIS — H35.033: ICD-10-CM

## 2024-09-04 DIAGNOSIS — E11.3313: ICD-10-CM

## 2024-09-04 PROCEDURE — 92134 CPTRZ OPH DX IMG PST SGM RTA: CPT | Performed by: OPHTHALMOLOGY

## 2024-09-04 PROCEDURE — 67028 INJECTION EYE DRUG: CPT | Mod: 58,50 | Performed by: OPHTHALMOLOGY

## 2024-09-04 ASSESSMENT — CONFRONTATIONAL VISUAL FIELD TEST (CVF)
OD_FINDINGS: FULL
OS_FINDINGS: FULL

## 2024-09-13 ENCOUNTER — APPOINTMENT (OUTPATIENT)
Dept: NEUROLOGY | Facility: CLINIC | Age: 79
End: 2024-09-13
Payer: MEDICARE

## 2024-09-13 VITALS
HEIGHT: 70 IN | DIASTOLIC BLOOD PRESSURE: 84 MMHG | HEART RATE: 56 BPM | BODY MASS INDEX: 27.92 KG/M2 | TEMPERATURE: 98.2 F | SYSTOLIC BLOOD PRESSURE: 142 MMHG | WEIGHT: 195 LBS

## 2024-09-13 DIAGNOSIS — G56.00 CARPAL TUNNEL SYNDROME, UNSPECIFIED UPPER LIMB: ICD-10-CM

## 2024-09-13 DIAGNOSIS — R56.9 UNSPECIFIED CONVULSIONS: ICD-10-CM

## 2024-09-13 DIAGNOSIS — M47.22 OTHER SPONDYLOSIS WITH MYELOPATHY, CERVICAL REGION: ICD-10-CM

## 2024-09-13 DIAGNOSIS — M47.12 OTHER SPONDYLOSIS WITH MYELOPATHY, CERVICAL REGION: ICD-10-CM

## 2024-09-13 DIAGNOSIS — R26.2 DIFFICULTY IN WALKING, NOT ELSEWHERE CLASSIFIED: ICD-10-CM

## 2024-09-13 PROCEDURE — G2211 COMPLEX E/M VISIT ADD ON: CPT

## 2024-09-13 PROCEDURE — 99213 OFFICE O/P EST LOW 20 MIN: CPT

## 2024-09-13 NOTE — DATA REVIEWED
[de-identified] : MRI brain without contrast  4/3/24: 1.  No acute intracranial abnormality 2.  Extensive chronic ischemic changes in the brain including large area of encephalomalacia in gliosis in the left parietal and occipital lobes.  Suggest correlation with history and previous imaging studies of available 3.  Small dural based mass in the right parietal convexity, most likely meningioma  MRI brain with and without contrast 5/6/2024: No acute intracranial findings. Chronic ischemic changes in the brain, including large area of encephalomalacia in the gliosis in left parieto-occipital lobes which demonstrate mild gyral enhancement, likely postischemic.  Small homogenously enhancing dural based mass in the right parietal convexity, most likely representing a meningioma.  No mass effect on the adjacent brain.  MRI head: Moderate focal narrowing of the distal perimesencephalic portion of the right posterior cerebral artery. The distal cervical right vertebral artery is not visualized and may be hypoplastic however contrast enhanced MRI of the neck may be considered for further evaluation. [de-identified] : EEG 1/22/21: Diffuse background slowing [de-identified] : MRI cervical spine 7/22/22:  Trace osseous stress reaction about the left C3/C4 facet joint with a trace joint effusion which is new since the prior examination.  Redemonstration of C4-C6 anterior cervical discectomy and fusion. Otherwise grossly unchanged multilevel cervical spondylosis as outlined above. Findings are again most prominent at the level of C3/C4 where there is unchanged near-complete circumferential effacement of the CSF column with contacting the cord.  Unchanged myelomalacia within the cervical cord at the level of C5.  NCV/EMG 2/1/23:  1. b/l chronic C6, C7, C8 radiculopathies 2. Moderate b/l sensorimotor median nerve neuropathy at the wrist consistent with carpal tunnel 3. reduced latencies and amplitude of b/l sensory ulnar neruves suggest the presence of an underlying neuropathy.  11/14/23: lacosamide < 0.5 levetiracetam 32.4

## 2024-09-13 NOTE — DISCUSSION/SUMMARY
[FreeTextEntry1] : 79 year old man with a history of focal seizures with impaired awareness and history of generalization as well as underlying mild cognitive impairment.   Seizures -Currently stable without any definite evidence of recent seizures. -continue Keppra 500 mg in the AM and 1000 mg at night -Continue Vimpat 100 mg BID. He was not compliant previously. Check levels with next blood tests. Refills sent for both for 90 day supplies. Discussed risk of SUDEP.   Gait instability -Multifactorial -Peripheral neuropathy -History of severe myelopathy and central cord syndrome s/p urgent anterior cervical decompression and fusion from C4-6 in 2017. -Referral for physical thearpy  Neck Pain -He follows with Dr. Alvarez. -Can continue gabapentin. He currently takes 300 mg in the AM and 600 mg at night and finds it to be helpful..   Carpal tunnel syndrome -EMG/NCV showed moderate b/l CTS, b/l chronic C6, C7, C8 radiculopathies and was suggestive of neuropathy -New referral for wrists splints placed.  Sleep Disturbance -Polysomnogram in December 2021 showed mild YONI with AHI of 6.7. The non-supine AHI was 2 and the supine AHI was 20. -PLM Index was 20 but the arousal index was 1. -B12 and ferritin were normal. -He has previously refused CPAP.  Cognitive Impairment: -Started on Donepezil at [facility] -Will obtain records.  f/u ~ 6 months, sooner if needed.

## 2024-09-13 NOTE — DISCUSSION/SUMMARY
[FreeTextEntry1] : 79 year old man with a history of focal seizures with impaired awareness and history of generalization as well as underlying mild cognitive impairment.   Seizures -Currently stable without any definite evidence of recent seizures. -continue Keppra 500 mg in the AM and 1000 mg at night -Continue Vimpat 100 mg BID. He was not compliant previously. Check levels with next blood tests. Refills sent for both for 90 day supplies. Discussed risk of SUDEP.   Gait instability -Multifactorial -Peripheral neuropathy -History of severe myelopathy and central cord syndrome s/p urgent anterior cervical decompression and fusion from C4-6 in 2017. -Referral for physical thearpy  Neck Pain -He follows with Dr. Alvarez. -Can continue gabapentin. He currently takes 300 mg in the AM and 600 mg at night and finds it to be helpful..   Carpal tunnel syndrome -EMG/NCV showed moderate b/l CTS, b/l chronic C6, C7, C8 radiculopathies and was suggestive of neuropathy -New referral for wrists splints placed.  Sleep Disturbance -Polysomnogram in December 2021 showed mild YONI with AHI of 6.7. The non-supine AHI was 2 and the supine AHI was 20. -PLM Index was 20 but the arousal index was 1. -B12 and ferritin were normal. -He has previously refused CPAP.  Cognitive Impairment: -Started on Donepezil at [facility] -Will obtain records.  f/u ~ 6 months, sooner if needed.

## 2024-09-13 NOTE — DATA REVIEWED
[de-identified] : MRI brain without contrast  4/3/24: 1.  No acute intracranial abnormality 2.  Extensive chronic ischemic changes in the brain including large area of encephalomalacia in gliosis in the left parietal and occipital lobes.  Suggest correlation with history and previous imaging studies of available 3.  Small dural based mass in the right parietal convexity, most likely meningioma  MRI brain with and without contrast 5/6/2024: No acute intracranial findings. Chronic ischemic changes in the brain, including large area of encephalomalacia in the gliosis in left parieto-occipital lobes which demonstrate mild gyral enhancement, likely postischemic.  Small homogenously enhancing dural based mass in the right parietal convexity, most likely representing a meningioma.  No mass effect on the adjacent brain.  MRI head: Moderate focal narrowing of the distal perimesencephalic portion of the right posterior cerebral artery. The distal cervical right vertebral artery is not visualized and may be hypoplastic however contrast enhanced MRI of the neck may be considered for further evaluation. [de-identified] : EEG 1/22/21: Diffuse background slowing [de-identified] : MRI cervical spine 7/22/22:  Trace osseous stress reaction about the left C3/C4 facet joint with a trace joint effusion which is new since the prior examination.  Redemonstration of C4-C6 anterior cervical discectomy and fusion. Otherwise grossly unchanged multilevel cervical spondylosis as outlined above. Findings are again most prominent at the level of C3/C4 where there is unchanged near-complete circumferential effacement of the CSF column with contacting the cord.  Unchanged myelomalacia within the cervical cord at the level of C5.  NCV/EMG 2/1/23:  1. b/l chronic C6, C7, C8 radiculopathies 2. Moderate b/l sensorimotor median nerve neuropathy at the wrist consistent with carpal tunnel 3. reduced latencies and amplitude of b/l sensory ulnar neruves suggest the presence of an underlying neuropathy.  11/14/23: lacosamide < 0.5 levetiracetam 32.4

## 2024-09-13 NOTE — HISTORY OF PRESENT ILLNESS
[FreeTextEntry1] : 5/19/22: Mr. Amato is a 77 year old man with a history of seizures, mild cognitive impairment and gait imbalance who has been a patient of Dr. Baptiste and is now seeing me since Dr. Baptiste has retired.  He is not aware of having any recent seizures. However, there are times when he is not sure if he has fallen asleep or had a seizure. He denies recent falls. He denies waking up with tongue bites. He has chronic urinary incontinence and wears diapers. He has not recently woken up with a diaper filled with urine.  He says that walking is poor.  He did have a sleep study at [facility] in December 2021 which showed mild sleep apnea with an AHI of 6.7, more significant in the supine position. He is only sleeping about 3 hours at a time due to urinary frequency. He sometimes falls back to sleep after getting up.  10/17/22: He went to see Dr. Alvarez last week. He notes a clicking in his neck and is having some problems with fine motor control in both hands.  Dr. Alvarez felt that in addition to myelopathy he may have some carpal tunnel syndrome. He does have some numbness in his fingers.  He is not aware of any recent seizures. He denies waking up recently with tongue bites or urinary incontinence.  He thinks that his memory problem is slightly better recently. Many months ago he left a supermarket without paying and he did not remember this. He has not had any similar episodes recently.  He does report some dizziness and sleepiness. He says that he does not get good sleep due to frequent urination.  5/30/23: He denies having any recent seizures.  He says that his memory is worse. He sometimes has difficulty finding words/names. Sometimes he forgets to takes his medications at the proper time. He says that he is unable to set alarms on his phone. He says that he has a weekly pill box but finds this to be more difficult for him because it is less clear when he is running low and needs a refill.  He denies recent falls, but says that he has "collapsed". His legs gave out. He is doing the exercises that were taught to him at rehab.  He was prescribed gabapentin by Kimberly Lombardo at Dr. Alvarez's office for his neck pain. He thinks that it is helping.  10/12/23: He is not aware of having any seizures. He is concerned that there may be seizures that he is not aware of. He did have an episode of disorientation for 1-2 minutes. He had been sitting on a park bench. He is not sure if he dozed off but for a minute he had to figure out where he was. No events have been witnessed. He does report falling asleep a lot. He has difficulty falling asleep.  He goes to bed very late. He likes to watch the news at midnight.    He says that his walking is pretty good.  9/13/24: He states that a few days ago he was diagnosed with asthma and given an inhaler.  He reports that he has been having problems with memory. He was at [facility] for something else and was told to see Cristal Andersen NP. An MRI brain with and without contrast was performed. He was found to have a meningioma. He was also started on a medication for dementia with a "D".  After the last visit he had a low lacosamide level. The patient said he did not realize that he was supposed to still be taking lacosamide. It was restarted.  The Istop shows that he did fill a prescription last month.  He is not aware of any events concerning for seizures. He states that he is taking his medications at this time.  He reports difficulty with balance.  He fell about two weeks ago. He says that he lost his balance.   He says he ran out of gabapentin recently. He noticed worsening of neck pain and when he restarted he noticed improvement in pain.
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
## 2024-09-16 ENCOUNTER — APPOINTMENT (OUTPATIENT)
Dept: DERMATOLOGY | Facility: CLINIC | Age: 79
End: 2024-09-16
Payer: MEDICARE

## 2024-09-16 DIAGNOSIS — L24.3 IRRITANT CONTACT DERMATITIS DUE TO COSMETICS: ICD-10-CM

## 2024-09-16 DIAGNOSIS — B35.4 TINEA CORPORIS: ICD-10-CM

## 2024-09-16 DIAGNOSIS — B35.3 TINEA PEDIS: ICD-10-CM

## 2024-09-16 DIAGNOSIS — B35.1 TINEA UNGUIUM: ICD-10-CM

## 2024-09-16 PROCEDURE — 99214 OFFICE O/P EST MOD 30 MIN: CPT

## 2024-09-16 NOTE — ASSESSMENT
[FreeTextEntry1] : Tinea pedis Overall doing well, but continue tx at this time to avoid recurrence.  Onychomycosis Not a candidate for oral medicaiton. Topicals unlikely to be effective Discussed, will follow.  Tinea corporis Discussed at length. Econazole cream daily  -   he has not been able to get a adequate supply of medication - the insurance only approving a solitary 85 gm tube ever month, and only lasts the patient 2 weeks. Will prescribe as I recommended, and attempt for insurance approval.  Irritant dermatitis discussed at length. Try Almay deodorants.

## 2024-09-16 NOTE — HISTORY OF PRESENT ILLNESS
[FreeTextEntry1] : Rash on the back and feet. [de-identified] : Tx'ing with econazole cream, but running out of medication, despite our efforts to refill.

## 2024-09-16 NOTE — PHYSICAL EXAM
[Alert] : alert [Oriented x 3] : ~L oriented x 3 [Well Nourished] : well nourished [Full Body Skin Exam Performed] : performed [FreeTextEntry3] : Erythematous scaling macules of the right back. Skin of the feet doing well, but with mycotic toenails.  Mild erythema of the bilateral axilla.

## 2024-10-23 ENCOUNTER — OFFICE (OUTPATIENT)
Dept: URBAN - METROPOLITAN AREA CLINIC 94 | Facility: CLINIC | Age: 79
Setting detail: OPHTHALMOLOGY
End: 2024-10-23
Payer: MEDICARE

## 2024-10-23 DIAGNOSIS — E11.3313: ICD-10-CM

## 2024-10-23 DIAGNOSIS — E11.3311: ICD-10-CM

## 2024-10-23 DIAGNOSIS — H35.033: ICD-10-CM

## 2024-10-23 DIAGNOSIS — H34.8110: ICD-10-CM

## 2024-10-23 DIAGNOSIS — H43.393: ICD-10-CM

## 2024-10-23 PROCEDURE — 92134 CPTRZ OPH DX IMG PST SGM RTA: CPT | Performed by: OPHTHALMOLOGY

## 2024-10-23 PROCEDURE — 67210 TREATMENT OF RETINAL LESION: CPT | Mod: RT | Performed by: OPHTHALMOLOGY

## 2024-10-23 ASSESSMENT — REFRACTION_AUTOREFRACTION
OD_AXIS: 094
OD_CYLINDER: -2.75
OD_SPHERE: -1.25
OS_AXIS: 086
OS_CYLINDER: -2.75
OS_SPHERE: -1.50

## 2024-10-23 ASSESSMENT — KERATOMETRY
METHOD_AUTO_MANUAL: AUTO
OD_K2POWER_DIOPTERS: 45.00
OD_AXISANGLE_DEGREES: 180
OS_AXISANGLE_DEGREES: 175
OS_K2POWER_DIOPTERS: 45.25
OD_K1POWER_DIOPTERS: 42.75
OS_K1POWER_DIOPTERS: 43.00

## 2024-10-23 ASSESSMENT — DECREASING TEAR LAKE - SEVERITY SCORE
OS_DEC_TEARLAKE: T
OD_DEC_TEARLAKE: T

## 2024-10-23 ASSESSMENT — VISUAL ACUITY
OD_BCVA: 20/25
OS_BCVA: 20/70

## 2024-11-05 ENCOUNTER — ASC (OUTPATIENT)
Dept: URBAN - METROPOLITAN AREA SURGERY 8 | Facility: SURGERY | Age: 79
Setting detail: OPHTHALMOLOGY
End: 2024-11-05
Payer: MEDICARE

## 2024-11-05 DIAGNOSIS — E11.3312: ICD-10-CM

## 2024-11-05 PROCEDURE — 67210 TREATMENT OF RETINAL LESION: CPT | Mod: 79,LT | Performed by: OPHTHALMOLOGY

## 2024-11-05 ASSESSMENT — TONOMETRY
OD_IOP_MMHG: 11
OS_IOP_MMHG: 10

## 2024-11-05 ASSESSMENT — KERATOMETRY
OS_AXISANGLE_DEGREES: 175
OD_K2POWER_DIOPTERS: 45.00
OD_K1POWER_DIOPTERS: 42.75
OS_K1POWER_DIOPTERS: 43.00
OD_AXISANGLE_DEGREES: 180
OS_K2POWER_DIOPTERS: 45.25
METHOD_AUTO_MANUAL: AUTO

## 2024-11-05 ASSESSMENT — REFRACTION_AUTOREFRACTION
OS_CYLINDER: -2.75
OD_SPHERE: -1.25
OS_AXIS: 086
OD_AXIS: 094
OD_CYLINDER: -2.75
OS_SPHERE: -1.50

## 2024-11-05 ASSESSMENT — DECREASING TEAR LAKE - SEVERITY SCORE
OS_DEC_TEARLAKE: T
OD_DEC_TEARLAKE: T

## 2024-11-05 ASSESSMENT — VISUAL ACUITY
OD_BCVA: 20/20-1
OS_BCVA: 20/60

## 2024-11-05 ASSESSMENT — CONFRONTATIONAL VISUAL FIELD TEST (CVF)
OD_FINDINGS: FULL
OS_FINDINGS: FULL

## 2024-11-26 ENCOUNTER — EMERGENCY (EMERGENCY)
Facility: HOSPITAL | Age: 79
LOS: 0 days | Discharge: HOME | End: 2024-11-26
Attending: STUDENT IN AN ORGANIZED HEALTH CARE EDUCATION/TRAINING PROGRAM
Payer: MEDICARE

## 2024-11-26 VITALS
DIASTOLIC BLOOD PRESSURE: 81 MMHG | OXYGEN SATURATION: 100 % | RESPIRATION RATE: 20 BRPM | TEMPERATURE: 98 F | HEART RATE: 51 BPM | SYSTOLIC BLOOD PRESSURE: 166 MMHG

## 2024-11-26 VITALS — WEIGHT: 208.56 LBS

## 2024-11-26 DIAGNOSIS — Z91.041 RADIOGRAPHIC DYE ALLERGY STATUS: ICD-10-CM

## 2024-11-26 DIAGNOSIS — I12.9 HYPERTENSIVE CHRONIC KIDNEY DISEASE WITH STAGE 1 THROUGH STAGE 4 CHRONIC KIDNEY DISEASE, OR UNSPECIFIED CHRONIC KIDNEY DISEASE: ICD-10-CM

## 2024-11-26 DIAGNOSIS — Q85.89 OTHER PHAKOMATOSES, NOT ELSEWHERE CLASSIFIED: ICD-10-CM

## 2024-11-26 DIAGNOSIS — T46.5X6A UNDERDOSING OF OTHER ANTIHYPERTENSIVE DRUGS, INITIAL ENCOUNTER: ICD-10-CM

## 2024-11-26 DIAGNOSIS — I25.10 ATHEROSCLEROTIC HEART DISEASE OF NATIVE CORONARY ARTERY WITHOUT ANGINA PECTORIS: ICD-10-CM

## 2024-11-26 DIAGNOSIS — N18.9 CHRONIC KIDNEY DISEASE, UNSPECIFIED: ICD-10-CM

## 2024-11-26 DIAGNOSIS — Z98.89 OTHER SPECIFIED POSTPROCEDURAL STATES: Chronic | ICD-10-CM

## 2024-11-26 DIAGNOSIS — E78.5 HYPERLIPIDEMIA, UNSPECIFIED: ICD-10-CM

## 2024-11-26 DIAGNOSIS — E11.22 TYPE 2 DIABETES MELLITUS WITH DIABETIC CHRONIC KIDNEY DISEASE: ICD-10-CM

## 2024-11-26 DIAGNOSIS — J45.909 UNSPECIFIED ASTHMA, UNCOMPLICATED: ICD-10-CM

## 2024-11-26 LAB
ALBUMIN SERPL ELPH-MCNC: 3.3 G/DL — SIGNIFICANT CHANGE UP (ref 3.3–5)
ALP SERPL-CCNC: 59 U/L — SIGNIFICANT CHANGE UP (ref 40–120)
ALT FLD-CCNC: 29 U/L — SIGNIFICANT CHANGE UP (ref 12–78)
ANION GAP SERPL CALC-SCNC: 1 MMOL/L — LOW (ref 5–17)
AST SERPL-CCNC: 32 U/L — SIGNIFICANT CHANGE UP (ref 15–37)
BILIRUB SERPL-MCNC: 0.6 MG/DL — SIGNIFICANT CHANGE UP (ref 0.2–1.2)
BUN SERPL-MCNC: 40 MG/DL — HIGH (ref 7–23)
CALCIUM SERPL-MCNC: 9.3 MG/DL — SIGNIFICANT CHANGE UP (ref 8.5–10.1)
CHLORIDE SERPL-SCNC: 114 MMOL/L — HIGH (ref 96–108)
CO2 SERPL-SCNC: 27 MMOL/L — SIGNIFICANT CHANGE UP (ref 22–31)
CREAT SERPL-MCNC: 2.22 MG/DL — HIGH (ref 0.5–1.3)
EGFR: 29 ML/MIN/1.73M2 — LOW
GLUCOSE SERPL-MCNC: 89 MG/DL — SIGNIFICANT CHANGE UP (ref 70–99)
HCT VFR BLD CALC: 33.4 % — LOW (ref 39–50)
HGB BLD-MCNC: 11.9 G/DL — LOW (ref 13–17)
MCHC RBC-ENTMCNC: 30.9 PG — SIGNIFICANT CHANGE UP (ref 27–34)
MCHC RBC-ENTMCNC: 35.6 G/DL — SIGNIFICANT CHANGE UP (ref 32–36)
MCV RBC AUTO: 86.8 FL — SIGNIFICANT CHANGE UP (ref 80–100)
PLATELET # BLD AUTO: 160 K/UL — SIGNIFICANT CHANGE UP (ref 150–400)
POTASSIUM SERPL-MCNC: 4.3 MMOL/L — SIGNIFICANT CHANGE UP (ref 3.5–5.3)
POTASSIUM SERPL-SCNC: 4.3 MMOL/L — SIGNIFICANT CHANGE UP (ref 3.5–5.3)
PROT SERPL-MCNC: 6.4 GM/DL — SIGNIFICANT CHANGE UP (ref 6–8.3)
RBC # BLD: 3.85 M/UL — LOW (ref 4.2–5.8)
RBC # FLD: 13.2 % — SIGNIFICANT CHANGE UP (ref 10.3–14.5)
SODIUM SERPL-SCNC: 142 MMOL/L — SIGNIFICANT CHANGE UP (ref 135–145)
WBC # BLD: 6.48 K/UL — SIGNIFICANT CHANGE UP (ref 3.8–10.5)
WBC # FLD AUTO: 6.48 K/UL — SIGNIFICANT CHANGE UP (ref 3.8–10.5)

## 2024-11-26 PROCEDURE — 80053 COMPREHEN METABOLIC PANEL: CPT

## 2024-11-26 PROCEDURE — 99283 EMERGENCY DEPT VISIT LOW MDM: CPT | Mod: 25

## 2024-11-26 PROCEDURE — 93010 ELECTROCARDIOGRAM REPORT: CPT

## 2024-11-26 PROCEDURE — 36415 COLL VENOUS BLD VENIPUNCTURE: CPT

## 2024-11-26 PROCEDURE — 85027 COMPLETE CBC AUTOMATED: CPT

## 2024-11-26 PROCEDURE — 93005 ELECTROCARDIOGRAM TRACING: CPT

## 2024-11-26 PROCEDURE — 99284 EMERGENCY DEPT VISIT MOD MDM: CPT | Mod: GC

## 2024-11-26 RX ORDER — AMLODIPINE BESYLATE 10 MG
2.5 TABLET ORAL ONCE
Refills: 0 | Status: COMPLETED | OUTPATIENT
Start: 2024-11-26 | End: 2024-11-26

## 2024-11-26 RX ORDER — FUROSEMIDE 40 MG
40 TABLET ORAL ONCE
Refills: 0 | Status: COMPLETED | OUTPATIENT
Start: 2024-11-26 | End: 2024-11-26

## 2024-11-26 RX ORDER — ISOSORBIDE MONONITRATE 60 MG/1
30 TABLET, EXTENDED RELEASE ORAL DAILY
Refills: 0 | Status: DISCONTINUED | OUTPATIENT
Start: 2024-11-26 | End: 2024-11-26

## 2024-11-26 RX ADMIN — ISOSORBIDE MONONITRATE 30 MILLIGRAM(S): 60 TABLET, EXTENDED RELEASE ORAL at 19:37

## 2024-11-26 RX ADMIN — Medication 40 MILLIGRAM(S): at 19:37

## 2024-11-26 RX ADMIN — Medication 2.5 MILLIGRAM(S): at 19:37
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
## 2024-11-26 NOTE — ED ADULT NURSE NOTE - CAS EDN DISCHARGE INTERVENTIONS
Problem: Pain  Goal: #Acceptable pain level achieved/maintained at rest using NRS/Faces  Description: This goal is used for patients who can self-report.  Acceptable means the level is at or below the identified comfort/function goal.  Outcome: Outcome Met, Continue evaluating goal progress toward completion  Goal: # Verbalizes understanding of pain management  Description: Documented in Patient Education Activity  Outcome: Outcome Met, Continue evaluating goal progress toward completion     Problem: Activity Intolerance  Goal: # Functional status is maintained or returned to baseline  Outcome: Outcome Met, Continue evaluating goal progress toward completion  Goal: # Tolerates activity for d/c setting with no clinical problems  Outcome: Outcome Met, Continue evaluating goal progress toward completion     Problem: Impaired Physical Mobility  Goal: # Bed mobility, ambulation, and ADLs are maintained or returned to baseline during hospitalization  Outcome: Outcome Met, Continue evaluating goal progress toward completion     Problem: Breathing Pattern Ineffective  Goal: Air exchange is effective, demonstrated by Sp02 sat of greater then or = 92% (or as ordered)  Outcome: Outcome Met, Continue evaluating goal progress toward completion  Goal: Respiratory pattern is quiet and regular without report of SOB  Outcome: Outcome Met, Continue evaluating goal progress toward completion  Goal: Breathing pattern demonstrates minimal apnea during sleep with appropriate use of airway pressure support devices  Outcome: Outcome Met, Continue evaluating goal progress toward completion  Goal: Verbalizes/demonstrates effective breathing management strategies  Description: Document education using the patient education activity.   Outcome: Outcome Met, Continue evaluating goal progress toward completion      IV discontinued, cath removed intact

## 2024-11-26 NOTE — ED PROVIDER NOTE - PHYSICAL EXAMINATION
Constitutional: NAD, alert, verbal  HEENT: NCAT, EOMi, PERRL  Cardiac: RRR no MRG  Resp: clear, no wheezing or crackles  GI: ab soft ntnd, no r/g  Ext: no edema  Neuro: A&Ox3, SALINAS  Skin: No rashes Constitutional: NAD, alert, verbal  HEENT: NCAT, EOMi, PERRL  Cardiac: RRR no MRG  Resp: clear, no wheezing or crackles  GI: ab soft ntnd, no r/g  Ext: 1-2+ edema b/l  Neuro: A&Ox3, SALINAS  Skin: No rashes

## 2024-11-26 NOTE — ED ADULT NURSE NOTE - CAS EDP DISCH TYPE
Costocondritis   LO QUE NECESITA SABER:   ¿Qué es la costocondritis? La costocondritis es leonard condición que causa dolor en el cartílago que conecta roberto costillas a catherine esternón  El cartílago es el tejido sari y flexible que protege a los [place]  ¿Qué causa la costocondritis? La causa de costocondritis podría ser desconocida o podría ser causada por leonard de las siguientes:  · Lesión en el pecho:  George Gold lesión al pecho podría causar costocondritis  · Distensión:  Las actividades que tensionan la pared torácica podrían provocar costocondritis  [place] incluye cuando tose fuertemente  Las distenciones también pueden ocurrir al practicar deportes que requieren movimientos del brazo de manera repetida, tales oh remar, levantar pesas y el voleibol  · Infección:  Las infecciones del pulmón o del pecho pueden aumentar catherine riesgo de costocondritis  · Enfermedades inflamatorias:  Las enfermedades que pueden provocar inflamación alrededor de roberto articulaciones, oh artritis reumatoide, aumentan catherine riesgo de costocondritis  ¿Cuáles son los signos y síntomas de costocondritis? La costocondritis causa dolor en la abram donde catherine esternón se une a roberto costillas  El dolor puede ir y venir, y podría empeorar con el tiempo  El dolor puede ser marva o sordo y persistente  Podría tener dolor al tocar catherine pecho  El dolor podría propagarse hacia catherine espalda, abdomen o en catherine Rebeca Branden  Es probable que Jj Carrasco cuando usted se [place], respira profundamente, empuja o levanta un [place]  El dolor podría dificultar catherine habilidad de dormir o realizar roberto actividades normales  ¿Cómo se diagnostica la costocondritis? Catherine médico le preguntará acerca de roberto signos y síntomas  El médico también llevará a cabo un examen físico  Mukesh Heidi y podría  roberto brazos para determinar si esto le causa dolor  ¿Cómo se trata la costocondritis?   Es probable que el dolor de la costocondritis desaparezca sin tratamiento, generalmente dentro de un año  Catherine tratamiento depende de la causa de catherine costocondritis  Es posible que usted necesite alguno de los siguientes:  · Acetaminofeno:  Milka medicamento disminuye el dolor  El acetaminofeno puede obtener sin receta médica  Pregunte la cantidad y la frecuencia con que debe tomarlos  [address]  El acetaminofén puede causar daño en el hígado cuando no se abbey de forma correcta  · AINEs (Analgésicos antiinflamatorios no esteroides) oh el ibuprofeno, ayudan a disminuir la inflamación, el dolor y la [place]  Milka medicamento esta disponible con o sin leonard receta médica  Los AINEs pueden causar sangrado estomacal o problemas renales en ciertas personas  Si usted esta tomando un anticoágulante,  siempre  pregunte si los AINEs son seguros para usted  Siempre new la etiqueta de milka medicamento y [place] instrucciones  No administre milka medicamento a niños menores de 6 meses de shannan sin antes obtener la autorización de catherine médico   ¿Qué puedo hacer para disminuir el dolor causado por costocondritis? · Descanse:  Es posible que necesite descansar y evitar movimientos y actividades dolorosas  No levante objetos, oh un bolso o mochila, si esto le causa dolor  Evite las [employer] levantamiento de pesas, hasta que el dolor disminuya o desaparezca  Pregúntele a catherine médico cuáles actividades son adecuadas para usted mientras se recupera    · Calor:  La aplicación de calor facilita la reducción del dolor en algunos pacientes  Aplíquese calor en el área lesionada chyna 20 a 30 minutos cada 2 horas chyna la cantidad de [employer] indiquen  · Hielo:  El hielo ayuda a disminuir la inflamación y el dolor  El hielo también puede contribuir a evitar el daño de los tejidos  Use un paquete de hielo o ponga hielo molido dentro de [employer]  Cúbralo con leonard toalla y colóquelo en el área adolorida por 15 a 20 minutos cada hora, o oh se le indique      · Ejercicios de estiramiento:  El estiramiento suave podría aliviar roberto síntomas  Párese en loenard pierre y ponga roberto cahvez en el jeremiah de la pierre al nivel de las orejas u [place] un paso hacia adelante y estire catherine pecho suavemente  Quyen lo mismo con las chavez a un nivel más alto [place]  ¿Cuándo faye comunicarme con mi médico?   · Usted tiene fiebre  · Las áreas dolorosas en catherine pecho se marilee inflamadas, enrojecidas o se sienten calientes al tacto  · Usted no puede dormir a causa del dolor  · Usted tiene preguntas o inquietudes acerca de catherine condición o cuidado  ACUERDOS SOBRE CATHERINE CUIDADO:   Usted tiene el derecho de ayudar a planear catherine cuidado  Aprenda todo lo que pueda sobre catherine condición y oh darle tratamiento  Discuta roberto opciones de tratamiento con roberto médicos para decidir el cuidado que usted desea recibir  Usted siempre tiene el derecho de rechazar el tratamiento  Esta información es sólo para uso en educación  Catherine intención no es darle un consejo médico sobre enfermedades o tratamientos  Colsulte con catherine [place] farmacéutico antes de seguir cualquier régimen médico para saber si es seguro y efectivo para usted  © 2017 [address]  Information is for End User's use only and may not be sold, redistributed or otherwise used for commercial purposes  All illustrations and images included in CareNotes® are the copyrighted property of A D A M , Inc  or Dom Cartagena  Home

## 2024-11-26 NOTE — ED PROVIDER NOTE - OBJECTIVE STATEMENT
79 year old male with PMHx of DM, HLD, HTN, asthma, CKD, CAD, Sturge-Quinonez syndrome, [place] presents to ED sent in by physician for HTN. he states he has not taken any of his daily medications today as he was not running on his normal schedule. he states he arrived to his dentist appointment and was told to report to the ED by them due to noticed high blood pressure after he missed his medication doses. he states his BP is typically around 130/140. His cardiologist is LENA Wood. He denies chest pain, difficulty breathing, urinary hesitancy. he states he occasionally feels dizzy however, this is not "out of the ordinary" for him. note: creatinine typically at 3.5 per patient  patients medication list is as follows: Lasix 40 mg every other day(he does not know when he had his last dose), isosorbide 30 mg once a day. amlodipine 2.5 mg, metoprolol 25 mg at night,

## 2024-11-26 NOTE — ED PROVIDER NOTE - ATTENDING CONTRIBUTION TO CARE
I, Jane Ashford MD,  performed the initial face to face bedside interview with this patient regarding history of present illness, review of symptoms and relevant past medical, social and family history.  I completed an independent physical examination.  I was the initial provider who evaluated this patient. I have signed out the follow up of any pending tests (i.e. labs, radiological studies) to the resident.  I have communicated the patient’s plan of care and disposition with the resident.  The history, relevant review of systems, past medical and surgical history, medical decision making, and physical examination was documented by the scribe in my presence and I attest to the accuracy of the documentation.

## 2024-11-26 NOTE — ED PROVIDER NOTE - PATIENT PORTAL LINK FT
You can access the FollowMyHealth Patient Portal offered by [facility] by registering at the following website: [email or web address]. By joining [facility]’s FollowMyHealth portal, you will also be able to view your health information using other applications (apps) compatible with our system.

## 2024-11-26 NOTE — ED ADULT NURSE NOTE - NSFALLHARMRISKINTERV_ED_ALL_ED

## 2024-11-26 NOTE — ED ADULT TRIAGE NOTE - CHIEF COMPLAINT QUOTE
Pt sent in by MD for HTN systolic pressure in the 200's. PMHx of HTN , pt states he did not take BP meds today, +blood thinners +dizziness "because I am very anxious about everything" ,-headache - blurry vision, +clear speech,

## 2024-11-26 NOTE — ED ADULT NURSE NOTE - OBJECTIVE STATEMENT
Pt is an 79 year old male with hx of HTN who went to dentist this am without taking his morning antihypertensive medication and was referred to ER as systolic was over 200. Denies headache or visual disturbance and current B/P 185/73. Pt also with hx of CAD,  diabetes, asthma, CKD, epilepsy.

## 2024-11-26 NOTE — ED PROVIDER NOTE - NS ED ATTENDING STATEMENT MOD
I have seen and examined this patient and fully participated in the care of this patient as the teaching attending.  The service was shared with the YAZAN.  I reviewed and verified the documentation. Attending with

## 2024-11-26 NOTE — ED PROVIDER NOTE - PROGRESS NOTE DETAILS
Kermit PGY3: Pt was reassessed and is doing well. Results, including any incidental findings, were discussed. Baseline creatinine is 3. Follow up and return precautions were discussed. Patient verbalized understanding

## 2024-11-26 NOTE — ED PROVIDER NOTE - NSFOLLOWUPINSTRUCTIONS_ED_ALL_ED_FT
You were seen in the Emergency Department for elevated blood pressure in the setting of not taking your medications today. You were given your medications and underwent screening blood work which was nonactionable. Your EKG was unremarkable. You feel okay and are clear for discharge. Please follow up with your primary care physician and cardiologist within the next couple of days for further management of your blood pressure.    1) Continue all previously prescribed medications as directed.    2) Follow up with your primary care physician - take copies of your results.    3) Return to the Emergency Department for worsening or persistent symptoms, and/or ANY NEW OR CONCERNING SYMPTOMS. You were seen in the Emergency Department for elevated blood pressure in the setting of not taking your medications today. You were given your medications and underwent screening blood work which was nonactionable. Your EKG was unremarkable. Your blood pressure is still elevated but has improved significantly. You feel well and are clear for discharge. Please follow up with your primary care physician and cardiologist within the next couple of days for further management of your blood pressure.    1) Continue all previously prescribed medications as directed.    2) Follow up with your primary care physician - take copies of your results.    3) Return to the Emergency Department for worsening or persistent symptoms, and/or ANY NEW OR CONCERNING SYMPTOMS.

## 2024-11-26 NOTE — ED PROVIDER NOTE - CLINICAL SUMMARY MEDICAL DECISION MAKING FREE TEXT BOX
78 y/o male with hx of htn p/w asymptomatic htn. sent in by dentist. Patient c/o of no symptoms. didd not take his meds this morning due to being in a rush.    hypertensive, will give home meds  exam w/ b/l 1-2+ edema, clear lungs    asymptomatic htn. will check basic labs, ekg. anticpate d/c w/ cards f/u. 80 y/o male with hx of htn p/w asymptomatic htn. sent in by dentist. Patient c/o of no symptoms. did not take his meds this morning due to being in a rush.    hypertensive, will give home meds  exam w/ b/l 1-2+ edema, clear lungs    asymptomatic htn. will check basic labs, ekg. anticpate d/c w/ cards f/u.

## 2024-12-13 NOTE — REASON FOR VISIT
Health Maintenance       Diabetes Eye Exam (Yearly)  Overdue since 9/19/2023    Medicare Advantage- Medicare Wellness Visit (Yearly - January to December)  Overdue since 1/1/2024    Colorectal Cancer Risk - Colonoscopy (Every 5 Years)  Overdue since 2/21/2024    COVID-19 Vaccine (6 - 2024-25 season)  Overdue since 9/1/2024    Diabetes Foot Exam (Yearly)  Due since 12/7/2024           Following review of the above:  Patient is not proceeding with: Colorectal Cancer Screening, Diabetes Eye Exam, Diabetes Foot Exam, and COVID-19    Note: Refer to final orders and clinician documentation.          5.) Do you do moderate to strenuous exercise (brisk walk) for about 20 minutes for 3 or more days per week?: No, I usually do not exercise this much     6 a.) How many servings of Fruits and Vegetables do you have each day ( 1 serving = 1 piece of fruit, 1/2 cup fruits or vegetables): 1 per day     7a.) Have you had a fall in the past year?: Yes     7b.) Do you feel unsteady when standing or walking?: Yes     7c.) Do you worry about falling?: Yes           Review Flowsheet  More data exists         12/11/2024   PHQ 2/9 Score   Adult PHQ 2 Score 0   Adult PHQ 2 Interpretation No further screening needed   Little interest or pleasure in activity? Not at all   Feeling down, depressed or hopeless? Not at all      Details                     Advance care planning documents on file - yes       [Follow-Up: _____] : a [unfilled] follow-up visit [FreeTextEntry1] : Follow up for Pt with Seizure disorder and Ch Cervical pain and Gait imbalance with neuropathy

## 2024-12-16 ENCOUNTER — EMERGENCY (EMERGENCY)
Facility: HOSPITAL | Age: 79
LOS: 0 days | Discharge: HOME | End: 2024-12-16
Attending: STUDENT IN AN ORGANIZED HEALTH CARE EDUCATION/TRAINING PROGRAM
Payer: MEDICARE

## 2024-12-16 VITALS
OXYGEN SATURATION: 99 % | TEMPERATURE: 98 F | DIASTOLIC BLOOD PRESSURE: 97 MMHG | SYSTOLIC BLOOD PRESSURE: 172 MMHG | RESPIRATION RATE: 16 BRPM | HEART RATE: 60 BPM

## 2024-12-16 VITALS
DIASTOLIC BLOOD PRESSURE: 74 MMHG | TEMPERATURE: 98 F | HEART RATE: 70 BPM | RESPIRATION RATE: 16 BRPM | SYSTOLIC BLOOD PRESSURE: 164 MMHG | OXYGEN SATURATION: 99 %

## 2024-12-16 DIAGNOSIS — Z88.8 ALLERGY STATUS TO OTHER DRUGS, MEDICAMENTS AND BIOLOGICAL SUBSTANCES: ICD-10-CM

## 2024-12-16 DIAGNOSIS — E11.22 TYPE 2 DIABETES MELLITUS WITH DIABETIC CHRONIC KIDNEY DISEASE: ICD-10-CM

## 2024-12-16 DIAGNOSIS — I12.9 HYPERTENSIVE CHRONIC KIDNEY DISEASE WITH STAGE 1 THROUGH STAGE 4 CHRONIC KIDNEY DISEASE, OR UNSPECIFIED CHRONIC KIDNEY DISEASE: ICD-10-CM

## 2024-12-16 DIAGNOSIS — Z01.89 ENCOUNTER FOR OTHER SPECIFIED SPECIAL EXAMINATIONS: ICD-10-CM

## 2024-12-16 DIAGNOSIS — Z79.4 LONG TERM (CURRENT) USE OF INSULIN: ICD-10-CM

## 2024-12-16 DIAGNOSIS — Z98.89 OTHER SPECIFIED POSTPROCEDURAL STATES: Chronic | ICD-10-CM

## 2024-12-16 LAB — GLUCOSE BLDC GLUCOMTR-MCNC: 170 MG/DL — HIGH (ref 70–99)

## 2024-12-16 PROCEDURE — 82962 GLUCOSE BLOOD TEST: CPT

## 2024-12-16 PROCEDURE — 99283 EMERGENCY DEPT VISIT LOW MDM: CPT

## 2024-12-16 PROCEDURE — 99282 EMERGENCY DEPT VISIT SF MDM: CPT

## 2024-12-16 NOTE — ED PROVIDER NOTE - NSFOLLOWUPINSTRUCTIONS_ED_ALL_ED_FT
1) Please follow-up with your primary care doctor in the next 5-7 days.  Please call tomorrow for an appointment.  If you cannot follow-up with your primary care doctor please return to the ED for any urgent issues.  2) You were given a copy of the tests performed today.  Please bring the results with you and review them with your primary care doctor.  3) If you have any worsening of symptoms or any other concerns please return to the ED immediately.  4) Please continue taking your home medications as directed.    Hypoglycemia    Hypoglycemia occurs when the glucose (sugar) level in your blood is too low. Symptoms include confusion, weakness, or fainting. You may even appear to be having a stroke. Take medications exactly as prescribed by your health care professional. Maintain a healthy lifestyle and follow up with your primary care physician.    SEEK IMMEDIATE MEDICAL CARE IF YOU HAVE ANY OF THE FOLLOWING SYMPTOMS: weakness, fainting, change in mental status, nausea or vomiting, fruity smell to your breath, or any signs of dehydration. 1) Please follow-up with your primary care doctor in the next 5-7 days.  Please call tomorrow for an appointment.  If you cannot follow-up with your primary care doctor please return to the ED for any urgent issues.  2) You were given a copy of the tests performed today.  Please bring the results with you and review them with your primary care doctor.  3) If you have any worsening of symptoms or any other concerns please return to the ED immediately.  4) Please continue taking your home medications as directed.    Hyperglycemia    Hyperglycemia occurs when the glucose (sugar) level in your blood is too high. Symptoms include increased urination, increased thirst, a dry mouth, or changes in appetite. If started on a medication, take exactly as prescribed by your health care professional. Maintain a healthy lifestyle and follow up with your primary care physician.    SEEK IMMEDIATE MEDICAL CARE IF YOU HAVE ANY OF THE FOLLOWING SYMPTOMS: shortness of breath, change in mental status, nausea or vomiting, fruity smell to your breath, or any signs of dehydration.      Hypoglycemia    Hypoglycemia occurs when the glucose (sugar) level in your blood is too low. Symptoms include confusion, weakness, or fainting. You may even appear to be having a stroke. Take medications exactly as prescribed by your health care professional. Maintain a healthy lifestyle and follow up with your primary care physician.    SEEK IMMEDIATE MEDICAL CARE IF YOU HAVE ANY OF THE FOLLOWING SYMPTOMS: weakness, fainting, change in mental status, nausea or vomiting, fruity smell to your breath, or any signs of dehydration.

## 2024-12-16 NOTE — ED ADULT TRIAGE NOTE - CHIEF COMPLAINT QUOTE
Pt BIBEMS reporting home glucometer reading 29, states "the machine may have displayed an old reading but I was nervous and wanted to be checked out". BGM in triage 196, A&Ox4. No meds PTA. Recently seen at  for hypertension. Denies any other medical complaints.
DISPLAY PLAN FREE TEXT
DISPLAY PLAN FREE TEXT

## 2024-12-16 NOTE — ED ADULT NURSE NOTE - OBJECTIVE STATEMENT
Pt BIBEMS reporting home glucometer reading 29, states "the machine may have displayed an old reading but I was nervous and wanted to be checked out". no s/s of hypoglycemia noted. pt a & ox4. .

## 2024-12-16 NOTE — ED PROVIDER NOTE - CLINICAL SUMMARY MEDICAL DECISION MAKING FREE TEXT BOX
Patient presents to the ER for possible hypoglycemia.  States that his glucometer read 29 which prompted him to call EMS.  EMS had a reading of 178 even though patient had not eaten or drinking anything between the 2 readings.  On arrival to the ER patient's blood sugar 196.  Patient is well-appearing, vitally stable.  States that he feels well.  Will recheck blood sugar in 1 hour. Patient presents to the ER for possible hypoglycemia.  States that his glucometer read 29 which prompted him to call EMS.  EMS had a reading of 178 even though patient had not eaten or drinking anything between the 2 readings.  On arrival to the ER patient's blood sugar 196.  Patient is well-appearing, vitally stable.  States that he feels well.  On repeat BG one hour from previous patient with stable hyperglycemia. Patient also hypertensive in ER but assymptomatic. Advised close f/u with PCP. Ambulating independently in ED. Tolerating PO. Stable for dc. Patient verbalizes understanding of return precautions and f/u.

## 2024-12-16 NOTE — ED ADULT NURSE NOTE - CHIEF COMPLAINT QUOTE
Pt BIBEMS reporting home glucometer reading 29, states "the machine may have displayed an old reading but I was nervous and wanted to be checked out". BGM in triage 196, A&Ox4. No meds PTA. Recently seen at  for hypertension. Denies any other medical complaints.

## 2024-12-16 NOTE — ED PROVIDER NOTE - PATIENT PORTAL LINK FT
You can access the FollowMyHealth Patient Portal offered by [facility] by registering at the following website: [email or web address]. By joining [employer]’s FollowMyHealth portal, you will also be able to view your health information using other applications (apps) compatible with our system.

## 2024-12-16 NOTE — ED PROVIDER NOTE - OBJECTIVE STATEMENT
79-year-old male with history of diabetes (on insulin), hypertension, hyperlipidemia, CKD presents to the ER for low blood sugar.  Patient takes his blood sugar every morning and every night.  Tonight before going to bed he took his blood sugar it read 29.  Patient states that he had a reading last week that also read 29 and thinks his glucometer may be broken.  States that he had no symptoms prior however afterwards felt anxious and called EMS.  Upon EMSs arrival patient's blood sugar was 178.  Patient states that he did not eat or drink anything between his reading and EMS's reading.  Patient states that he feels well at this time no lightheadedness, dizziness, syncope.  AO x 3 on arrival

## 2024-12-16 NOTE — ED ADULT NURSE NOTE - NSFALLUNIVINTERV_ED_ALL_ED
Bed/Stretcher in lowest position, wheels locked, appropriate side rails in place/Call bell, personal items and telephone in reach/Instruct patient to call for assistance before getting out of bed/chair/stretcher/Non-slip footwear applied when patient is off stretcher/[place] to call system/Physically safe environment - no spills, clutter or unnecessary equipment/Purposeful proactive rounding/Room/bathroom lighting operational, light cord in reach

## 2024-12-17 ENCOUNTER — OFFICE (OUTPATIENT)
Dept: URBAN - METROPOLITAN AREA CLINIC 94 | Facility: CLINIC | Age: 79
Setting detail: OPHTHALMOLOGY
End: 2024-12-17
Payer: MEDICARE

## 2024-12-17 DIAGNOSIS — H35.033: ICD-10-CM

## 2024-12-17 DIAGNOSIS — E11.3311: ICD-10-CM

## 2024-12-17 PROCEDURE — 92134 CPTRZ OPH DX IMG PST SGM RTA: CPT | Performed by: OPHTHALMOLOGY

## 2024-12-17 PROCEDURE — 67028 INJECTION EYE DRUG: CPT | Mod: 58,RT | Performed by: OPHTHALMOLOGY

## 2024-12-17 ASSESSMENT — REFRACTION_AUTOREFRACTION
OS_CYLINDER: -2.75
OS_SPHERE: -1.50
OD_SPHERE: -1.25
OD_CYLINDER: -2.75
OS_AXIS: 086
OD_AXIS: 094

## 2024-12-17 ASSESSMENT — VISUAL ACUITY
OD_BCVA: 20/20-1
OS_BCVA: 20/80

## 2024-12-17 ASSESSMENT — KERATOMETRY
OD_AXISANGLE_DEGREES: 180
OS_K2POWER_DIOPTERS: 45.25
METHOD_AUTO_MANUAL: AUTO
OD_K1POWER_DIOPTERS: 42.75
OS_K1POWER_DIOPTERS: 43.00
OS_AXISANGLE_DEGREES: 175
OD_K2POWER_DIOPTERS: 45.00

## 2024-12-17 ASSESSMENT — CONFRONTATIONAL VISUAL FIELD TEST (CVF)
OD_FINDINGS: FULL
OS_FINDINGS: FULL

## 2024-12-17 ASSESSMENT — TONOMETRY
OS_IOP_MMHG: 14
OD_IOP_MMHG: 12

## 2024-12-17 ASSESSMENT — DECREASING TEAR LAKE - SEVERITY SCORE
OD_DEC_TEARLAKE: T
OS_DEC_TEARLAKE: T

## 2025-01-21 ENCOUNTER — APPOINTMENT (OUTPATIENT)
Dept: UROLOGY | Facility: CLINIC | Age: 80
End: 2025-01-21
Payer: MEDICARE

## 2025-01-21 DIAGNOSIS — N31.2 FLACCID NEUROPATHIC BLADDER, NOT ELSEWHERE CLASSIFIED: ICD-10-CM

## 2025-01-21 DIAGNOSIS — N40.0 BENIGN PROSTATIC HYPERPLASIA WITHOUT LOWER URINARY TRACT SYMPMS: ICD-10-CM

## 2025-01-21 DIAGNOSIS — R32 UNSPECIFIED URINARY INCONTINENCE: ICD-10-CM

## 2025-01-21 DIAGNOSIS — R39.15 URGENCY OF URINATION: ICD-10-CM

## 2025-01-21 PROCEDURE — 99214 OFFICE O/P EST MOD 30 MIN: CPT | Mod: 25

## 2025-01-21 PROCEDURE — 51798 US URINE CAPACITY MEASURE: CPT

## 2025-01-21 RX ORDER — MIRABEGRON 50 MG/1
50 TABLET, EXTENDED RELEASE ORAL
Qty: 90 | Refills: 3 | Status: ACTIVE | COMMUNITY
Start: 2025-01-21 | End: 1900-01-01

## 2025-01-21 RX ORDER — OXYBUTYNIN CHLORIDE 10 MG/1
10 TABLET, EXTENDED RELEASE ORAL
Qty: 7 | Refills: 0 | Status: ACTIVE | OUTPATIENT
Start: 2025-01-21

## 2025-01-22 LAB
APPEARANCE: CLEAR
BACTERIA: NEGATIVE /HPF
BILIRUBIN URINE: NEGATIVE
BLOOD URINE: NEGATIVE
CAST: 6 /LPF
COLOR: NORMAL
EPITHELIAL CELLS: 1 /HPF
GLUCOSE QUALITATIVE U: NEGATIVE MG/DL
HYALINE CASTS: PRESENT
KETONES URINE: NEGATIVE MG/DL
LEUKOCYTE ESTERASE URINE: NEGATIVE
MICROSCOPIC-UA: NORMAL
NITRITE URINE: NEGATIVE
PH URINE: 5.5
PROTEIN URINE: 300 MG/DL
RED BLOOD CELLS URINE: NORMAL /HPF
REVIEW: NORMAL
SPECIFIC GRAVITY URINE: 1.01
UROBILINOGEN URINE: 0.2 MG/DL
WHITE BLOOD CELLS URINE: 1 /HPF

## 2025-01-23 LAB — BACTERIA UR CULT: NORMAL

## 2025-01-28 ENCOUNTER — OFFICE (OUTPATIENT)
Dept: URBAN - METROPOLITAN AREA CLINIC 94 | Facility: CLINIC | Age: 80
Setting detail: OPHTHALMOLOGY
End: 2025-01-28
Payer: MEDICARE

## 2025-01-28 DIAGNOSIS — E11.3311: ICD-10-CM

## 2025-01-28 DIAGNOSIS — E11.3313: ICD-10-CM

## 2025-01-28 DIAGNOSIS — H34.8110: ICD-10-CM

## 2025-01-28 PROCEDURE — 92235 FLUORESCEIN ANGRPH MLTIFRAME: CPT | Performed by: OPHTHALMOLOGY

## 2025-01-28 PROCEDURE — 67210 TREATMENT OF RETINAL LESION: CPT | Mod: 79,RT | Performed by: OPHTHALMOLOGY

## 2025-01-28 PROCEDURE — 92134 CPTRZ OPH DX IMG PST SGM RTA: CPT | Performed by: OPHTHALMOLOGY

## 2025-01-28 ASSESSMENT — DECREASING TEAR LAKE - SEVERITY SCORE
OD_DEC_TEARLAKE: T
OS_DEC_TEARLAKE: T

## 2025-01-28 ASSESSMENT — KERATOMETRY
METHOD_AUTO_MANUAL: AUTO
OD_AXISANGLE_DEGREES: 180
OS_AXISANGLE_DEGREES: 175
OD_K1POWER_DIOPTERS: 42.75
OS_K1POWER_DIOPTERS: 43.00
OD_K2POWER_DIOPTERS: 45.00
OS_K2POWER_DIOPTERS: 45.25

## 2025-01-28 ASSESSMENT — REFRACTION_AUTOREFRACTION
OS_SPHERE: -1.50
OS_CYLINDER: -2.75
OS_AXIS: 086
OD_SPHERE: -1.25
OD_CYLINDER: -2.75
OD_AXIS: 094

## 2025-01-28 ASSESSMENT — TONOMETRY
OS_IOP_MMHG: 11
OD_IOP_MMHG: 12

## 2025-01-28 ASSESSMENT — CONFRONTATIONAL VISUAL FIELD TEST (CVF)
OS_FINDINGS: FULL
OD_FINDINGS: FULL

## 2025-01-28 ASSESSMENT — VISUAL ACUITY
OD_BCVA: 20/20-1
OS_BCVA: 20/50-1

## 2025-01-29 ENCOUNTER — NON-APPOINTMENT (OUTPATIENT)
Age: 80
End: 2025-01-29

## 2025-01-29 LAB — URINE CYTOLOGY: NORMAL

## 2025-02-12 ENCOUNTER — EMERGENCY (EMERGENCY)
Facility: HOSPITAL | Age: 80
LOS: 0 days | Discharge: HOME | End: 2025-02-13
Attending: EMERGENCY MEDICINE
Payer: MEDICARE

## 2025-02-12 VITALS — WEIGHT: 205.47 LBS | HEIGHT: 70 IN

## 2025-02-12 DIAGNOSIS — Z91.041 RADIOGRAPHIC DYE ALLERGY STATUS: ICD-10-CM

## 2025-02-12 DIAGNOSIS — R25.1 TREMOR, UNSPECIFIED: ICD-10-CM

## 2025-02-12 DIAGNOSIS — Z98.89 OTHER SPECIFIED POSTPROCEDURAL STATES: Chronic | ICD-10-CM

## 2025-02-12 DIAGNOSIS — E11.22 TYPE 2 DIABETES MELLITUS WITH DIABETIC CHRONIC KIDNEY DISEASE: ICD-10-CM
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
## 2025-02-12 DIAGNOSIS — Z79.4 LONG TERM (CURRENT) USE OF INSULIN: ICD-10-CM

## 2025-02-12 DIAGNOSIS — I12.9 HYPERTENSIVE CHRONIC KIDNEY DISEASE WITH STAGE 1 THROUGH STAGE 4 CHRONIC KIDNEY DISEASE, OR UNSPECIFIED CHRONIC KIDNEY DISEASE: ICD-10-CM
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
## 2025-02-12 DIAGNOSIS — R52 PAIN, UNSPECIFIED: ICD-10-CM

## 2025-02-12 DIAGNOSIS — E78.5 HYPERLIPIDEMIA, UNSPECIFIED: ICD-10-CM

## 2025-02-12 PROCEDURE — 84484 ASSAY OF TROPONIN QUANT: CPT

## 2025-02-12 PROCEDURE — 80053 COMPREHEN METABOLIC PANEL: CPT

## 2025-02-12 PROCEDURE — 99283 EMERGENCY DEPT VISIT LOW MDM: CPT | Mod: 25

## 2025-02-12 PROCEDURE — 81001 URINALYSIS AUTO W/SCOPE: CPT

## 2025-02-12 PROCEDURE — 36415 COLL VENOUS BLD VENIPUNCTURE: CPT

## 2025-02-12 PROCEDURE — 85027 COMPLETE CBC AUTOMATED: CPT

## 2025-02-12 PROCEDURE — 36000 PLACE NEEDLE IN VEIN: CPT

## 2025-02-12 PROCEDURE — 93005 ELECTROCARDIOGRAM TRACING: CPT

## 2025-02-12 PROCEDURE — 93010 ELECTROCARDIOGRAM REPORT: CPT

## 2025-02-12 PROCEDURE — 99284 EMERGENCY DEPT VISIT MOD MDM: CPT
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
## 2025-02-13 VITALS
OXYGEN SATURATION: 99 % | RESPIRATION RATE: 15 BRPM | DIASTOLIC BLOOD PRESSURE: 82 MMHG | TEMPERATURE: 98 F | HEART RATE: 60 BPM | SYSTOLIC BLOOD PRESSURE: 174 MMHG

## 2025-02-13 LAB
ALBUMIN SERPL ELPH-MCNC: 3.1 G/DL — LOW (ref 3.3–5)
ALP SERPL-CCNC: 74 U/L — SIGNIFICANT CHANGE UP (ref 40–120)
ALT FLD-CCNC: 30 U/L — SIGNIFICANT CHANGE UP (ref 12–78)
ANION GAP SERPL CALC-SCNC: 5 MMOL/L — SIGNIFICANT CHANGE UP (ref 5–17)
APPEARANCE UR: CLEAR — SIGNIFICANT CHANGE UP
AST SERPL-CCNC: 42 U/L — HIGH (ref 15–37)
BACTERIA # UR AUTO: NEGATIVE /HPF — SIGNIFICANT CHANGE UP
BILIRUB SERPL-MCNC: 0.6 MG/DL — SIGNIFICANT CHANGE UP (ref 0.2–1.2)
BILIRUB UR-MCNC: NEGATIVE — SIGNIFICANT CHANGE UP
BUN SERPL-MCNC: 40 MG/DL — HIGH (ref 7–23)
CALCIUM SERPL-MCNC: 9.1 MG/DL — SIGNIFICANT CHANGE UP (ref 8.5–10.1)
CAST: 2 /LPF — SIGNIFICANT CHANGE UP (ref 0–4)
CHLORIDE SERPL-SCNC: 108 MMOL/L — SIGNIFICANT CHANGE UP (ref 96–108)
CO2 SERPL-SCNC: 27 MMOL/L — SIGNIFICANT CHANGE UP (ref 22–31)
COLOR SPEC: YELLOW — SIGNIFICANT CHANGE UP
CREAT SERPL-MCNC: 2.15 MG/DL — HIGH (ref 0.5–1.3)
DIFF PNL FLD: ABNORMAL
EGFR: 30 ML/MIN/1.73M2 — LOW
GLUCOSE SERPL-MCNC: 207 MG/DL — HIGH (ref 70–99)
GLUCOSE UR QL: 100 MG/DL
HCT VFR BLD CALC: 35.9 % — LOW (ref 39–50)
HGB BLD-MCNC: 12.8 G/DL — LOW (ref 13–17)
KETONES UR-MCNC: ABNORMAL MG/DL
LEUKOCYTE ESTERASE UR-ACNC: ABNORMAL
MCHC RBC-ENTMCNC: 31 PG — SIGNIFICANT CHANGE UP (ref 27–34)
MCHC RBC-ENTMCNC: 35.7 G/DL — SIGNIFICANT CHANGE UP (ref 32–36)
MCV RBC AUTO: 86.9 FL — SIGNIFICANT CHANGE UP (ref 80–100)
NITRITE UR-MCNC: NEGATIVE — SIGNIFICANT CHANGE UP
NRBC # BLD AUTO: 0 K/UL — SIGNIFICANT CHANGE UP (ref 0–0)
NRBC # FLD: 0 K/UL — SIGNIFICANT CHANGE UP (ref 0–0)
NRBC BLD AUTO-RTO: 0 /100 WBCS — SIGNIFICANT CHANGE UP (ref 0–0)
PH UR: 5.5 — SIGNIFICANT CHANGE UP (ref 5–8)
PLATELET # BLD AUTO: 173 K/UL — SIGNIFICANT CHANGE UP (ref 150–400)
PMV BLD: 12.4 FL — SIGNIFICANT CHANGE UP (ref 7–13)
POTASSIUM SERPL-MCNC: 4.2 MMOL/L — SIGNIFICANT CHANGE UP (ref 3.5–5.3)
POTASSIUM SERPL-SCNC: 4.2 MMOL/L — SIGNIFICANT CHANGE UP (ref 3.5–5.3)
PROT SERPL-MCNC: 6.2 GM/DL — SIGNIFICANT CHANGE UP (ref 6–8.3)
PROT UR-MCNC: 300 MG/DL
RBC # BLD: 4.13 M/UL — LOW (ref 4.2–5.8)
RBC # FLD: 13.2 % — SIGNIFICANT CHANGE UP (ref 10.3–14.5)
RBC CASTS # UR COMP ASSIST: 0 /HPF — SIGNIFICANT CHANGE UP (ref 0–4)
SODIUM SERPL-SCNC: 140 MMOL/L — SIGNIFICANT CHANGE UP (ref 135–145)
SP GR SPEC: 1.02 — SIGNIFICANT CHANGE UP (ref 1–1.03)
SQUAMOUS # UR AUTO: 1 /HPF — SIGNIFICANT CHANGE UP (ref 0–5)
TROPONIN I, HIGH SENSITIVITY RESULT: 16.99 NG/L — SIGNIFICANT CHANGE UP
UROBILINOGEN FLD QL: 1 MG/DL — SIGNIFICANT CHANGE UP (ref 0.2–1)
WBC # BLD: 6.85 K/UL — SIGNIFICANT CHANGE UP (ref 3.8–10.5)
WBC # FLD AUTO: 6.85 K/UL — SIGNIFICANT CHANGE UP (ref 3.8–10.5)
WBC UR QL: 1 /HPF — SIGNIFICANT CHANGE UP (ref 0–5)

## 2025-02-13 NOTE — ED ADULT NURSE NOTE - NSFALLHARMRISKINTERV_ED_ALL_ED
Assistance with ambulation/Communicate risk of Fall with Harm to all staff, patient, and family/Provide visual cue: red socks, yellow wristband, yellow gown, etc/Reinforce activity limits and safety measures with patient and family/Bed in lowest position, wheels locked, appropriate side rails in place/Call bell, personal items and telephone in reach/Instruct patient to call for assistance before getting out of bed/chair/stretcher/Non-slip footwear applied when patient is off stretcher/[place] to call system/Physically safe environment - no spills, clutter or unnecessary equipment/Purposeful Proactive Rounding/Room/bathroom lighting operational, light cord in reach
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
## 2025-02-13 NOTE — ED PROVIDER NOTE - NSFOLLOWUPINSTRUCTIONS_ED_ALL_ED_FT
Please follow-up with your primary doctor.  Return to ED if chest pain shortness of breath or other concerning symptoms.

## 2025-02-13 NOTE — ED PROVIDER NOTE - PATIENT PORTAL LINK FT
You can access the FollowMyHealth Patient Portal offered by [facility] by registering at the following website: [email or web address]. By joining [employer]’s FollowMyHealth portal, you will also be able to view your health information using other applications (apps) compatible with our system.

## 2025-02-13 NOTE — ED PROVIDER NOTE - WET READ LAUNCH FT
Start antibiotics.  Use probiotics or yogurt to limit GI effects  Tylenol 650 mg by mouth every 6 hours as needed for pain  Soft foods eat as tolerated  Follow-up with your primary care doctor.  An order was placed for internal medicine through [facility].  You can also call [phone] [facility] to establish a primary care doctor  Red flags discussed go to ER immediately  Patient Education     Dental Abscess  An abscess is a sac of fluid (pus). A dental abscess forms when a tooth or the tissue around it becomes infected with bacteria. The bacteria can enter through a cavity or a crack in a tooth. It can also infect the gum tissue or bone around a tooth. An untreated abscess can cause the loss of the tooth. It can even spread to other parts of the body and become life-threatening.     Symptoms of a dental abscess   Symptoms include:  · Toothache, often severe  · Tooth pain with hot, cold, or pressure  · Pain in the gums, cheek, or jaw  · Bad breath or bitter taste in the mouth  · Trouble swallowing or opening the mouth  · Fever  · Swollen or enlarged neck glands  Diagnosing a dental abscess  The dentist will ask about your symptoms and check your teeth and gums. You will be told if you need any tests, such as dental X-rays.   Treating a dental abscess  Treatments for a dental abscess may include:   · Antibiotic medicines. These treat the underlying infection.  · Pain relievers. These help you feel more comfortable. Your healthcare provider may prescribe a medicine for you. Or you may use over-the-counter pain relievers, such as acetaminophen or ibuprofen.  · Warm saltwater rinses. These can soothe mild pain and help clear away pus.  · Root canal surgery.  This may be done if needed to save the tooth. With a root canal, the infected part of the tooth is removed. A special substance is then used to fill the empty space in the tooth.  · Draining the abscess. This may be done if needed. Cuts (incisions) are made to let the  infected material drain from the tooth.  · Removing the tooth. This is done in cases of severe infection that can’t be treated another way.  You may need to be admitted to a hospital if the infection is severe, has spread, or doesn’t respond to treatment.   When to get medical advice  Call your dentist right away if you have any of the following:   · Fever of 100.4°F  ( 38°C) or higher  · More pain, redness, drainage, or swelling in the treated area  · Face or jawbone swelling  · Pain that can't be controlled with medicines  Preventing dental abscess  To prevent another abscess in the future, keep your teeth clean and healthy. Brush twice a day and floss at least once daily. See your dentist for regular exams and tooth cleanings. And stay away from sugary foods and drinks that can lead to tooth decay.   Sun last reviewed this educational content on 2/1/2020 © 2000-2020 [employer]. [address] [place] [address], [place] [address]. All rights reserved. This information is not intended as a substitute for professional medical care. Always follow your healthcare professional's instructions.            There are no Wet Read(s) to document.

## 2025-02-13 NOTE — ED ADULT NURSE NOTE - NS ED NURSE RECORD ANOTHER VITAL SIGN
Patients GI provider:  Dr. Dunne     Number to return call: [phone]    Reason for call: Nupur from [facility] pharmacy calling to request a new prescription be sent for Famotidine 40 mg.    Scheduled procedure/appointment date if applicable: Apt/procedure      Yes

## 2025-02-13 NOTE — ED ADULT NURSE NOTE - NSFALLRISKFACTORS_ED_ALL_ED
81mg ASA/Coagulation: Bleeding disorder either through use of anticoagulants or underlying clinical condition(s)
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
## 2025-02-14 ENCOUNTER — APPOINTMENT (OUTPATIENT)
Dept: RADIOLOGY | Facility: CLINIC | Age: 80
End: 2025-02-14

## 2025-02-14 ENCOUNTER — OUTPATIENT (OUTPATIENT)
Dept: OUTPATIENT SERVICES | Facility: HOSPITAL | Age: 80
LOS: 1 days | End: 2025-02-14
Payer: MEDICARE

## 2025-02-14 ENCOUNTER — RESULT REVIEW (OUTPATIENT)
Age: 80
End: 2025-02-14

## 2025-02-14 DIAGNOSIS — M25.512 PAIN IN LEFT SHOULDER: ICD-10-CM

## 2025-02-14 PROCEDURE — 73030 X-RAY EXAM OF SHOULDER: CPT | Mod: 26,LT

## 2025-02-14 PROCEDURE — 73030 X-RAY EXAM OF SHOULDER: CPT

## 2025-02-21 ENCOUNTER — EMERGENCY (EMERGENCY)
Facility: HOSPITAL | Age: 80
LOS: 0 days | Discharge: HOME | End: 2025-02-21
Attending: FAMILY MEDICINE
Payer: MEDICARE

## 2025-02-21 VITALS
HEART RATE: 55 BPM | RESPIRATION RATE: 18 BRPM | SYSTOLIC BLOOD PRESSURE: 156 MMHG | DIASTOLIC BLOOD PRESSURE: 73 MMHG | OXYGEN SATURATION: 100 %

## 2025-02-21 VITALS — WEIGHT: 202.6 LBS | HEIGHT: 70 IN

## 2025-02-21 DIAGNOSIS — I10 ESSENTIAL (PRIMARY) HYPERTENSION: ICD-10-CM

## 2025-02-21 DIAGNOSIS — Z98.89 OTHER SPECIFIED POSTPROCEDURAL STATES: Chronic | ICD-10-CM

## 2025-02-21 DIAGNOSIS — J45.909 UNSPECIFIED ASTHMA, UNCOMPLICATED: ICD-10-CM

## 2025-02-21 DIAGNOSIS — E11.22 TYPE 2 DIABETES MELLITUS WITH DIABETIC CHRONIC KIDNEY DISEASE: ICD-10-CM

## 2025-02-21 DIAGNOSIS — Z91.041 RADIOGRAPHIC DYE ALLERGY STATUS: ICD-10-CM

## 2025-02-21 DIAGNOSIS — R00.1 BRADYCARDIA, UNSPECIFIED: ICD-10-CM

## 2025-02-21 DIAGNOSIS — G40.909 EPILEPSY, UNSPECIFIED, NOT INTRACTABLE, WITHOUT STATUS EPILEPTICUS: ICD-10-CM

## 2025-02-21 DIAGNOSIS — I12.9 HYPERTENSIVE CHRONIC KIDNEY DISEASE WITH STAGE 1 THROUGH STAGE 4 CHRONIC KIDNEY DISEASE, OR UNSPECIFIED CHRONIC KIDNEY DISEASE: ICD-10-CM

## 2025-02-21 DIAGNOSIS — E78.5 HYPERLIPIDEMIA, UNSPECIFIED: ICD-10-CM

## 2025-02-21 DIAGNOSIS — Q85.89 OTHER PHAKOMATOSES, NOT ELSEWHERE CLASSIFIED: ICD-10-CM

## 2025-02-21 LAB
ALBUMIN SERPL ELPH-MCNC: 3 G/DL — LOW (ref 3.3–5)
ALP SERPL-CCNC: 69 U/L — SIGNIFICANT CHANGE UP (ref 40–120)
ALT FLD-CCNC: 27 U/L — SIGNIFICANT CHANGE UP (ref 12–78)
ANION GAP SERPL CALC-SCNC: 2 MMOL/L — LOW (ref 5–17)
APTT BLD: 33.6 SEC — SIGNIFICANT CHANGE UP (ref 24.5–35.6)
AST SERPL-CCNC: 27 U/L — SIGNIFICANT CHANGE UP (ref 15–37)
BASOPHILS # BLD AUTO: 0.03 K/UL — SIGNIFICANT CHANGE UP (ref 0–0.2)
BASOPHILS NFR BLD AUTO: 0.5 % — SIGNIFICANT CHANGE UP (ref 0–2)
BILIRUB SERPL-MCNC: 0.6 MG/DL — SIGNIFICANT CHANGE UP (ref 0.2–1.2)
BLD GP AB SCN SERPL QL: SIGNIFICANT CHANGE UP
BUN SERPL-MCNC: 38 MG/DL — HIGH (ref 7–23)
CALCIUM SERPL-MCNC: 9.3 MG/DL — SIGNIFICANT CHANGE UP (ref 8.5–10.1)
CHLORIDE SERPL-SCNC: 106 MMOL/L — SIGNIFICANT CHANGE UP (ref 96–108)
CO2 SERPL-SCNC: 31 MMOL/L — SIGNIFICANT CHANGE UP (ref 22–31)
CREAT SERPL-MCNC: 2.25 MG/DL — HIGH (ref 0.5–1.3)
EGFR: 29 ML/MIN/1.73M2 — LOW
EOSINOPHIL # BLD AUTO: 0.13 K/UL — SIGNIFICANT CHANGE UP (ref 0–0.5)
EOSINOPHIL NFR BLD AUTO: 2 % — SIGNIFICANT CHANGE UP (ref 0–6)
GLUCOSE SERPL-MCNC: 218 MG/DL — HIGH (ref 70–99)
HCT VFR BLD CALC: 32.4 % — LOW (ref 39–50)
HGB BLD-MCNC: 11.5 G/DL — LOW (ref 13–17)
IMM GRANULOCYTES # BLD AUTO: 0.02 K/UL — SIGNIFICANT CHANGE UP (ref 0–0.07)
IMM GRANULOCYTES NFR BLD AUTO: 0.3 % — SIGNIFICANT CHANGE UP (ref 0–0.9)
INR BLD: 1 RATIO — SIGNIFICANT CHANGE UP (ref 0.85–1.16)
LACTATE SERPL-SCNC: 1.4 MMOL/L — SIGNIFICANT CHANGE UP (ref 0.7–2)
LYMPHOCYTES # BLD AUTO: 0.84 K/UL — LOW (ref 1–3.3)
LYMPHOCYTES NFR BLD AUTO: 13.1 % — SIGNIFICANT CHANGE UP (ref 13–44)
MCHC RBC-ENTMCNC: 30.9 PG — SIGNIFICANT CHANGE UP (ref 27–34)
MCHC RBC-ENTMCNC: 35.5 G/DL — SIGNIFICANT CHANGE UP (ref 32–36)
MCV RBC AUTO: 87.1 FL — SIGNIFICANT CHANGE UP (ref 80–100)
MONOCYTES # BLD AUTO: 0.54 K/UL — SIGNIFICANT CHANGE UP (ref 0–0.9)
MONOCYTES NFR BLD AUTO: 8.4 % — SIGNIFICANT CHANGE UP (ref 2–14)
NEUTROPHILS # BLD AUTO: 4.84 K/UL — SIGNIFICANT CHANGE UP (ref 1.8–7.4)
NEUTROPHILS NFR BLD AUTO: 75.7 % — SIGNIFICANT CHANGE UP (ref 43–77)
NRBC # BLD AUTO: 0 K/UL — SIGNIFICANT CHANGE UP (ref 0–0)
NRBC # FLD: 0 K/UL — SIGNIFICANT CHANGE UP (ref 0–0)
NRBC BLD AUTO-RTO: 0 /100 WBCS — SIGNIFICANT CHANGE UP (ref 0–0)
PLATELET # BLD AUTO: 185 K/UL — SIGNIFICANT CHANGE UP (ref 150–400)
PMV BLD: 12.2 FL — SIGNIFICANT CHANGE UP (ref 7–13)
POTASSIUM SERPL-MCNC: 4.7 MMOL/L — SIGNIFICANT CHANGE UP (ref 3.5–5.3)
POTASSIUM SERPL-SCNC: 4.7 MMOL/L — SIGNIFICANT CHANGE UP (ref 3.5–5.3)
PROT SERPL-MCNC: 5.9 GM/DL — LOW (ref 6–8.3)
PROTHROM AB SERPL-ACNC: 11.5 SEC — SIGNIFICANT CHANGE UP (ref 9.9–13.4)
RBC # BLD: 3.72 M/UL — LOW (ref 4.2–5.8)
RBC # FLD: 13 % — SIGNIFICANT CHANGE UP (ref 10.3–14.5)
SODIUM SERPL-SCNC: 139 MMOL/L — SIGNIFICANT CHANGE UP (ref 135–145)
TROPONIN I, HIGH SENSITIVITY RESULT: 10.77 NG/L — SIGNIFICANT CHANGE UP
WBC # BLD: 6.4 K/UL — SIGNIFICANT CHANGE UP (ref 3.8–10.5)
WBC # FLD AUTO: 6.4 K/UL — SIGNIFICANT CHANGE UP (ref 3.8–10.5)

## 2025-02-21 PROCEDURE — 87040 BLOOD CULTURE FOR BACTERIA: CPT

## 2025-02-21 PROCEDURE — 84484 ASSAY OF TROPONIN QUANT: CPT

## 2025-02-21 PROCEDURE — 93005 ELECTROCARDIOGRAM TRACING: CPT

## 2025-02-21 PROCEDURE — 99285 EMERGENCY DEPT VISIT HI MDM: CPT

## 2025-02-21 PROCEDURE — 85610 PROTHROMBIN TIME: CPT

## 2025-02-21 PROCEDURE — 80053 COMPREHEN METABOLIC PANEL: CPT

## 2025-02-21 PROCEDURE — 83605 ASSAY OF LACTIC ACID: CPT

## 2025-02-21 PROCEDURE — 86850 RBC ANTIBODY SCREEN: CPT

## 2025-02-21 PROCEDURE — 86901 BLOOD TYPING SEROLOGIC RH(D): CPT

## 2025-02-21 PROCEDURE — 99285 EMERGENCY DEPT VISIT HI MDM: CPT | Mod: 25

## 2025-02-21 PROCEDURE — 73630 X-RAY EXAM OF FOOT: CPT | Mod: LT

## 2025-02-21 PROCEDURE — 71045 X-RAY EXAM CHEST 1 VIEW: CPT | Mod: 26

## 2025-02-21 PROCEDURE — 36415 COLL VENOUS BLD VENIPUNCTURE: CPT

## 2025-02-21 PROCEDURE — 85025 COMPLETE CBC W/AUTO DIFF WBC: CPT

## 2025-02-21 PROCEDURE — 36000 PLACE NEEDLE IN VEIN: CPT

## 2025-02-21 PROCEDURE — 85730 THROMBOPLASTIN TIME PARTIAL: CPT

## 2025-02-21 PROCEDURE — 73630 X-RAY EXAM OF FOOT: CPT | Mod: 26,LT

## 2025-02-21 PROCEDURE — 71045 X-RAY EXAM CHEST 1 VIEW: CPT

## 2025-02-21 PROCEDURE — 93010 ELECTROCARDIOGRAM REPORT: CPT

## 2025-02-21 PROCEDURE — 86900 BLOOD TYPING SEROLOGIC ABO: CPT

## 2025-02-21 RX ORDER — BACTERIOSTATIC SODIUM CHLORIDE 0.9 %
3 VIAL (ML) INJECTION ONCE
Refills: 0 | Status: COMPLETED | OUTPATIENT
Start: 2025-02-21 | End: 2025-02-21

## 2025-02-21 RX ORDER — ACETAMINOPHEN 160 MG/5ML
650 SUSPENSION ORAL ONCE
Refills: 0 | Status: COMPLETED | OUTPATIENT
Start: 2025-02-21 | End: 2025-02-21

## 2025-02-21 RX ADMIN — Medication 3 MILLILITER(S): at 18:09

## 2025-02-21 RX ADMIN — ACETAMINOPHEN 650 MILLIGRAM(S): 160 SUSPENSION ORAL at 20:21

## 2025-02-21 NOTE — ED PROVIDER NOTE - OBJECTIVE STATEMENT
[place]: 79 year old male w PMHx HLD, asthma, vertebral artery dissection, Sturge-Quinonez syndrome, epilepsy, HTN, DM, CKD presents to the ED c/o high blood pressure. Pt states he started not feeling well x3 weeks ago. Pt states his blood pressure is slightly high normally but today he went to the podiatrist for a toe nail injury and his systolic blood pressure was 215. Took antibiotic for the toe nail injury.+ occasional chest discomfort, SOB for the last few months, dizzy. Denies headache, trouble sleeping. Recently was diagnosed with asthma started taking . Pt states he experienced a trauma a few weeks ago that could be affecting blood pressure, causing him to feel upset and stressed.

## 2025-02-21 NOTE — ED PROVIDER NOTE - PATIENT PORTAL LINK FT
You can access the FollowMyHealth Patient Portal offered by [facility] by registering at the following website: [email or web address]. By joining [employer]’s FollowMyHealth portal, you will also be able to view your health information using other applications (apps) compatible with our system.

## 2025-02-21 NOTE — ED ADULT TRIAGE NOTE - AS HEIGHT TYPE
Patient Education     Neck Sprain or Strain  A sudden force that causes turning or bending of the neck can cause sprain or strain. An example would be the force from a car accident. This can stretch or tear muscles called a strain. It can also stretch or tear ligaments called a sprain. Either of these can cause neck pain. Sometimes neck pain occurs after a simple awkward movement. In either case, muscle spasm is commonly present and contributes to the pain.    Unless you had a forceful physical injury (for example, a car accident or fall), X-rays are usually not ordered for the initial evaluation of neck pain. If pain continues and dose not respond to medical treatment, X-rays and other tests may be performed at a later time.  Home care  · You may feel more soreness and spasm the first few days after the injury. Rest until symptoms begin to improve.  · When lying down, use a comfortable pillow or a rolled towel that supports the head and keeps the spine in a neutral position. The position of the head should not be tilted forward or backward.  · Apply an ice pack over the injured area for 15 to 20 minutes every 3 to 6 hours. You should do this for the first 24 to 48 hours. You can make an ice pack by filling a plastic bag that seals at the top with ice cubes and then wrapping it with a thin towel. After 48 hours, apply heat (warm shower or warm bath) for 15 to 20 minutes several times a day, or alternate ice and heat.  · You may use over-the-counter pain medicine to control pain, unless another pain medicine was prescribed. If you have chronic liver or kidney disease or ever had a stomach ulcer or GI bleeding, talk with your healthcare provider before using these medicines.  · If a soft cervical collar was prescribed, it should be worn only for periods of increased pain. It should not be worn for more than 3 hours a day, or for a period longer than 1 to 2 weeks.  Follow-up care  Follow up with your healthcare  provider as directed. Physical therapy may be needed.  Sometimes fractures don’t show up on the first X-ray. Bruises and sprains can sometimes hurt as much as a fracture. These injuries can take time to heal completely. If your symptoms don’t improve or they get worse, talk with your healthcare provider. You may need a repeat X-ray or other tests. If X-rays were taken, you will be told of any new findings that may affect your care.  Call 911  Call 911 if you have:   · Neck swelling, difficulty or painful swallowing  · Difficulty breathing  · Chest pain  When to seek medical advice  Call your healthcare provider right away if any of these occur:  · Pain becomes worse or spreads into your arms  · Weakness or numbness in one or both arms  © 0913-1156 [employer]. [address] [place] [address], [place] [address]. All rights reserved. This information is not intended as a substitute for professional medical care. Always follow your healthcare professional's instructions.            stated

## 2025-02-21 NOTE — ED ADULT NURSE NOTE - NSFALLRISKINTERV_ED_ALL_ED
Assistance OOB with selected safe patient handling equipment if applicable/Assistance with ambulation/Communicate fall risk and risk factors to all staff, patient, and family/Monitor gait and stability/Provide visual cue: yellow wristband, yellow gown, etc/Reinforce activity limits and safety measures with patient and family/Call bell, personal items and telephone in reach/Instruct patient to call for assistance before getting out of bed/chair/stretcher/Non-slip footwear applied when patient is off stretcher/[place] to call system/Physically safe environment - no spills, clutter or unnecessary equipment/Purposeful Proactive Rounding/Room/bathroom lighting operational, light cord in reach

## 2025-02-21 NOTE — ED ADULT TRIAGE NOTE - CHIEF COMPLAINT QUOTE
Pt presents to ED c/o high blood pressure. reports SBP at home was ready in the 200s. reports dizziness, also reports toe swelling. recent toenail removal. Denies CP/SOB Hx HTN, DM2

## 2025-02-21 NOTE — ED ADULT NURSE NOTE - OBJECTIVE STATEMENT
Pt presents to ED c/o high blood pressure. reports SBP at home was ready in the 200s. reports dizziness, denies chest pain. MD at bedside no other complaints at this time
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
## 2025-02-21 NOTE — ED PROVIDER NOTE - MUSCULOSKELETAL, MLM
Spine appears normal, range of motion is not limited, no muscle or joint tenderness, Ankylosis of Toe nails

## 2025-02-21 NOTE — ED PROVIDER NOTE - NSFOLLOWUPINSTRUCTIONS_ED_ALL_ED_FT
Drink lots of fluids. Avoid caffeine and medications that can increase your bp. Start melatonin at night. Follow up with your neurologist as planned.   Managing Your Hypertension  Hypertension, also called high blood pressure, is when the force of the blood pressing against the walls of the arteries is too strong. Arteries are blood vessels that carry blood from your heart throughout your body. Hypertension forces the heart to work harder to pump blood and may cause the arteries to become narrow or stiff.    Understanding blood pressure readings  A blood pressure reading includes a higher number over a lower number:  The first, or top, number is called the systolic pressure. It is a measure of the pressure in your arteries as your heart beats.  The second, or bottom number, is called the diastolic pressure. It is a measure of the pressure in your arteries as the heart relaxes.  For most people, a normal blood pressure is below 120/80. Your personal target blood pressure may vary depending on your medical conditions, your age, and other factors.    Blood pressure is classified into four stages. Based on your blood pressure reading, your health care provider may use the following stages to determine what type of treatment you need, if any. Systolic pressure and diastolic pressure are measured in a unit called millimeters of mercury (mmHg).    Normal    Systolic pressure: below 120.  Diastolic pressure: below 80.  Elevated    Systolic pressure: 120–129.  Diastolic pressure: below 80.  Hypertension stage 1    Systolic pressure: 130–139.  Diastolic pressure: 80–89.  Hypertension stage 2    Systolic pressure: 140 or above.  Diastolic pressure: 90 or above.  How can this condition affect me?  Managing your hypertension is very important. Over time, hypertension can damage the arteries and decrease blood flow to parts of the body, including the brain, heart, and kidneys. Having untreated or uncontrolled hypertension can lead to:  A heart attack.  A stroke.  A weakened blood vessel (aneurysm).  Heart failure.  Kidney damage.  Eye damage.  Memory and concentration problems.  Vascular dementia.  What actions can I take to manage this condition?  Hypertension can be managed by making lifestyle changes and possibly by taking medicines. Your health care provider will help you make a plan to bring your blood pressure within a normal range. You may be referred for counseling on a healthy diet and physical activity.    Nutrition    A plate with examples of foods in a healthy diet.  Eat a diet that is high in fiber and potassium, and low in salt (sodium), added sugar, and fat. An example eating plan is called the DASH diet. DASH stands for Dietary Approaches to Stop Hypertension. To eat this way:  Eat plenty of fresh fruits and vegetables. Try to fill one-half of your plate at each meal with fruits and vegetables.  Eat whole grains, such as whole-wheat pasta, brown rice, or whole-grain bread. Fill about one-fourth of your plate with whole grains.  Eat low-fat dairy products.  Avoid fatty cuts of meat, processed or cured meats, and poultry with skin. Fill about one-fourth of your plate with lean proteins such as fish, chicken without skin, beans, eggs, and tofu.  Avoid pre-made and processed foods. These tend to be higher in sodium, added sugar, and fat.  Reduce your daily sodium intake. Many people with hypertension should eat less than 1,500 mg of sodium a day.  Lifestyle    A person riding a bicycle wearing a safety helmet.  Work with your health care provider to maintain a healthy body weight or to lose weight. Ask what an ideal weight is for you.  Get at least 30 minutes of exercise that causes your heart to beat faster (aerobic exercise) most days of the week. Activities may include walking, swimming, or biking.  Include exercise to strengthen your muscles (resistance exercise), such as weight lifting, as part of your weekly exercise routine. Try to do these types of exercises for 30 minutes at least 3 days a week.  Do not use any products that contain nicotine or tobacco. These products include cigarettes, chewing tobacco, and vaping devices, such as e-cigarettes. If you need help quitting, ask your health care provider.  Control any long-term (chronic) conditions you have, such as high cholesterol or diabetes.  Identify your sources of stress and find ways to manage stress. This may include meditation, deep breathing, or making time for fun activities.  Alcohol use    Do not drink alcohol if:  Your health care provider tells you not to drink.  You are pregnant, may be pregnant, or are planning to become pregnant.  If you drink alcohol:  Limit how much you have to:  0–1 drink a day for women.  0–2 drinks a day for men.  Know how much alcohol is in your drink. In the [place] one drink equals one 12 oz bottle of beer (355 mL), one 5 oz glass of wine (148 mL), or one 1½ oz glass of hard liquor (44 mL).  Medicines    Your health care provider may prescribe medicine if lifestyle changes are not enough to get your blood pressure under control and if:  Your systolic blood pressure is 130 or higher.  Your diastolic blood pressure is 80 or higher.  Take medicines only as told by your health care provider. Follow the directions carefully. Blood pressure medicines must be taken as told by your health care provider. The medicine does not work as well when you skip doses. Skipping doses also puts you at risk for problems.    Monitoring    A person checking his or her blood pressure.   Before you monitor your blood pressure:  Do not smoke, drink caffeinated beverages, or exercise within 30 minutes before taking a measurement.  Use the bathroom and empty your bladder (urinate).  Sit quietly for at least 5 minutes before taking measurements.  Monitor your blood pressure at home as told by your health care provider. To do this:  Sit with your back straight and supported.  Place your feet flat on the floor. Do not cross your legs.  Support your arm on a flat surface, such as a table. Make sure your upper arm is at heart level.  Each time you measure, take two or three readings one minute apart and record the results.  You may also need to have your blood pressure checked regularly by your health care provider.    General information    Talk with your health care provider about your diet, exercise habits, and other lifestyle factors that may be contributing to hypertension.  Review all the medicines you take with your health care provider because there may be side effects or interactions.  Keep all follow-up visits. Your health care provider can help you create and adjust your plan for managing your high blood pressure.  Where to find more information  National Heart, Lung, and [employer]: www.nhlbi.nih.gov  American Heart Association: www.heart.org  Contact a health care provider if:  You think you are having a reaction to medicines you have taken.  You have repeated (recurrent) headaches.  You feel dizzy.  You have swelling in your ankles.  You have trouble with your vision.  Get help right away if:  You develop a severe headache or confusion.  You have unusual weakness or numbness, or you feel faint.  You have severe pain in your chest or abdomen.  You vomit repeatedly.  You have trouble breathing.  These symptoms may be an emergency. Get help right away. Call 911.  Do not wait to see if the symptoms will go away.  Do not drive yourself to the hospital.  Summary  Hypertension is when the force of blood pumping through your arteries is too strong. If this condition is not controlled, it may put you at risk for serious complications.  Your personal target blood pressure may vary depending on your medical conditions, your age, and other factors. For most people, a normal blood pressure is less than 120/80.  Hypertension is managed by lifestyle changes, medicines, or both.  Lifestyle changes to help manage hypertension include losing weight, eating a healthy, low-sodium diet, exercising more, stopping smoking, and limiting alcohol.  This information is not intended to replace advice given to you by your health care provider. Make sure you discuss any questions you have with your health care provider.    Document Revised: 09/01/2022 Document Reviewed: 09/01/2022  [employer] Patient Education © 2024 [employer]  [employer] logo  Terms and Conditions  Privacy Policy  Editorial Policy  All content on this site: Copyright © 2025 [employer], its licensors, and contributors. All rights are reserved, including those for text and data mining, [employer] training, and similar technologies. For all open access content, the Creative Commons licensing terms apply.  Cookies are used by this site. To decline or learn more, visit our Cookies page.  Insomnia    WHAT YOU NEED TO KNOW:    What is insomnia? Insomnia is a condition that makes it hard to fall or stay asleep. Lack of sleep can lead to attention or memory problems during the day. You may also be feliz, depressed, clumsy, or have headaches.    What increases my risk for insomnia?    Older age    Stress or worry    A medical condition, such as sleep apnea, GERD, COPD, or asthma    A mental health condition, such as depression or anxiety    Blood pressure medicines or antidepressants    Odd work schedules or frequent travel  How is insomnia diagnosed? Your healthcare provider will ask when your symptoms began and how often you cannot sleep. He or she will ask if you take any medicines that can cause insomnia, such as blood pressure medicine. He or she will ask if you have a medical condition, such as GERD, or a mental health condition, such as depression. You may also take a survey about your sleep.    How is insomnia treated?    Cognitive behavioral therapy (CBT) helps you find ways to relax, decrease stress, and improve sleep.    Medicines may help you sleep more regularly or help you feel less anxious. Take them as directed.  What can I do to improve my sleep?    Create a sleep schedule. Try to go to sleep and wake up at the same times every day. Keep a record of your sleep patterns, and any sleeping problems you have. Bring the record to follow-up visits with healthcare providers.    Do not take naps. Naps could make it hard for you to fall asleep at bedtime.    Keep your bedroom cool, quiet, and dark. Turn on white noise, such as a fan, to help you relax. Do not use your bed for any activity that will keep you awake. Do not read, exercise, eat, or watch TV in your bedroom.    Get up if you do not fall asleep within 20 minutes. Move to another room and do something relaxing until you become sleepy.    Limit caffeine, alcohol, and food to earlier in the day. Only drink caffeine in the morning. Do not drink alcohol within 6 hours of bedtime. Do not eat a heavy meal right before you go to bed.    Exercise regularly. Daily exercise may help you sleep better. Do not exercise within 4 hours of bedtime.  When should I contact my healthcare provider?    Your symptoms do not get better, or they get worse.    You begin to use drugs or alcohol to fall asleep.    You have questions or concerns about your condition or care.  CARE AGREEMENT:    You have the right to help plan your care. Learn about your health condition and how it may be treated. Discuss treatment options with your healthcare providers to decide what care you want to receive. You always have the right to refuse barbara  Elevate your foot and change bandage on toe daily. Return to ER if worse.

## 2025-02-23 NOTE — ED ADULT NURSE NOTE - NS ED NOTE ABUSE RESPONSE YN
[facility]    Progress Note - Hospitalist Service       Date of Admission:  2/18/2025    Assessment & Plan   Zack Johns is a 89 year old male with PMHx of CAD s/p stent (2018), PAD, T2DM, HTN, HLD admitted on 2/18/2025 for NSTEMI and acute hypoxic respiratory failure, likely secondary to new CHF. Declined coronary angiogram +/- stent, DNR/DNI. Recommended TCU at discharge, hoping to have placement available 2/24. Continue medical management.     Updates 2/23:  -Continue to work on discharge planning/recommended TCU at discharge  -Lisinopril 5 mg daily, cont. Metoprolol 12.5 mg BID  -spironolactone 25 mg daily   -HAI stable, Cr. 1.56  -Heparin gtt discontinued 2/22, started lovenox for VTE ppx  -Add imodium, probiotic 2/23 for loose stools, C Diff negative    Needs updated POLST prior to discharge     NSTEMI  CAD s/p RCA stent (2018)  PAD s/p BL iliac stents (2018)  Initial tropes trended from 43 > 103. EKG showed sinus bradycardia w/PVC, without acute ischemic changes when compared to previous in 2022.  Heparin gtt started and slowly discontinued 2/19. Patient cody c/p or palpitations, and has been borderline bradycardic during this admission. Of note, appears that they tried to give him nitroglycerin at his [facility] which did not improve symptoms. Cardiology consulted with ECHO completed 2/19 with EF 20-25% with global hypokinesis of LV. Discontinuing angiogram and will continue atorvastatin, lisinopril (holding 2/20), metoprolol, ASA 81 mg with outpatient management.  At this point unlikely to pursue angiogram and stenting and with worsening renal function would be delayed. Favor medical management  -Cards consult, recs appreciated               -Fluid restriction              -Resume cardiac diet              -ECHO completed 2/19, as above              -Per cardiology, cancel angiogram  -Lovenox VTE ppx  -PTA ASA 81mg      Acute hypoxic respiratory failure, improving  Concerns for  new CHF- EF 20-25%  Patient presents via EMS from [facility] for hypoxia down to SpO2 70s after dinner. He improved to the 90s with 6L NC O2 en route. Initial workup notable for BNP 2740 and CXR notable for increased interstitial markings concerning for pulmonary edema, which raise concerns for possible new CHF. Triggers could very well be NSTEMI discussed above. No si/sx to suggest arrhythmia or acute illness. Does not have known pulmonary conditions that would be concerning for COPD/asthma exacerbation. Shortness of breath improved after Lasix and was weaned to room air, satting comfortably in the low 90s. Has produced about 2 L of urine since admission.  - s/p lasix 60mg IV once in ED  - Continue PTA metoprolol 12.5 mg twice daily  - Lisinopril 5 mg daily  - Spironolactone 25 mg daily  -Holding lasix due to HAI, will assess diuretic needs at discharge however has remained euvolemic  - ECHO completed  - Strict I/O  - Daily weights  - Cardiac tele  - Cards consult per above   - Fluid restriction     Goals of care  Patient reports that he is eager to return to his [facility] facility as soon as possible. What is important to him is being with his wife, being able to enjoy good meals, conversations, and daily activities with his new neighbors. He recently moved to [place] in [place] and really likes it there. He does desire full cares, however, and wants to be in his best health to spend more time with his community. Had conversation with pt and wife about DNR/DNI, Wife states that they discussed this at [facility] and has said they would want Anthony to be DNR/DNI. Have had this conversation twice during this admission.  -Does have [employer] naming wife and son as his HCA's  -DNR/DNI status  -CM recs to home at independent living apartment  -Recommended TCU at discharge     Chronic conditions:  >Dementia - PTA donepazil, celexa, ritalin is still being held. Delirium  precautions placed.   >T2DM - Last A1c 6.2 in Mar 2023. Repeat A1c. LDSS.   >HTN - zestoretic per above - on hold   >HLD - PTA atorvastatin 40mg d      Delirium, improving  End stage dementia   Pt increasingly disoriented and agitated during the early morning of 2/20 and given Seroquel and Trazodone. Pt very lethargic in the AM and falling asleep mid conversation. Will avoid excess sedation by avoiding antipsychotics. Improving on trazodone.  -Delirium precautions  -Avoid antipsychotics for sedation  -Starting trazodone 25/50 mg at night               -Please start with 25 mg  -Frequent reorientation, blinds up in the day and closed at night     HAI  Patient has elevated Cr 1.17 --> 1.16 --> 1.27 --> 1.69. Pt is s/p once dose of 60 mg lasix and on fluid restriction. Possible that this is a prerenal HAI and pt is volume down. Given one dose of IV albumin. Continuing to hold ACE and loop diuretics. Continue ot monitor UOP and renal function closely, per cardiology.  Cr improved 1.69>1.57 2/22, stable 1.56 2/23  -s/p 1x dose of 50 mg albumin   -Continue to monitor     Hypokalemia, improved  Pt has decreased potassium levels 4 --> 4.1 --> 3.2 --> 3.9. Possibly due to single dose of lasix during this admission. Potassium bolus administered. Improving.  -Hypokalemia replacement protocol  -s/p 1x 40 mEq potassium chloride on 2/20  -Follow potassium        Diet: Fluid restriction 1800 ML FLUID  Low Saturated Fat Na <2400 mg    DVT Prophylaxis: Enoxaparin (Lovenox) SQ  Castellano Catheter: Not present  Fluids: PO  Lines: None     Cardiac Monitoring: None  Code Status: No CPR- Do NOT Intubate      Clinically Significant Risk Factors                   # Hypertension: Noted on problem list  # Chronic heart failure with reduced ejection fraction: last echo with EF <40%               # Financial/Environmental Concerns: none         Social Drivers of Health   Tobacco Use: Medium Risk (2/18/2025)    Patient History     Smoking Tobacco  Use: Former     Smokeless Tobacco Use: Never     Passive Exposure: Never         Disposition Plan     Medically Ready for Discharge: Anticipated Tomorrow         The patient's care was discussed with the Attending Physician, Dr. Avila .    Henry Davis MD  Hospitalist Service  [facility]  Securely message with [email or web address] (more info)  Text page via [email or web address] Paging/Directory   ______________________________________________________________________    Interval History   NAEO, slept most of the evening with the trazodone scheduled. Wife at bedside, plan is to continue to pursue TCU placement and continue with medical management. Appetite improving, he can have as much ice cream as he wants. Add probiotic and imodium for loose stools. Anthony was very alert and conversational this AM, fatigued from his BM but sitting up comfortably in his chair.    Physical Exam   Vital Signs: Temp: 98.5  F (36.9  C) Temp src: Oral BP: (!) 148/50 Pulse: 54   Resp: 20 SpO2: (!) 91 % O2 Device: None (Room air) Oxygen Delivery: 2 LPM  Weight: 132 lbs 4.42 oz    Constitutional: no apparent distress, oriented to place not time, and appears stated age  Eyes: Lids and lashes normal, pupils equal, extra ocular muscles intact, sclera clear, conjunctiva normal  Respiratory: No increased work of breathing, good air exchange, clear to auscultation bilaterally, no crackles or wheezing  Cardiovascular: Distant sounding/muted sounding heart rates, no murmur noted  GI: Normal bowel sounds, soft, non-distended, non-tender, no masses palpated, no hepatosplenomegally  Skin: No LE edema  Neuropsychiatric: Calm, oriented to place, and resting in bed        Data     I have personally reviewed the following data over the past 24 hrs:    8.6  \   9.5 (L)   / 166     140 104 45.3 (H) /  127 (H)   4.1 27 1.56 (H) \       Imaging results reviewed over the past 24 hrs:   No results found for this or any previous visit (from the past 24  Yes hours).    Henry Davis MD  PGY-2  [facility]  [facility]  February 23, 2025

## 2025-02-25 ENCOUNTER — OFFICE (OUTPATIENT)
Dept: URBAN - METROPOLITAN AREA CLINIC 94 | Facility: CLINIC | Age: 80
Setting detail: OPHTHALMOLOGY
End: 2025-02-25
Payer: MEDICARE

## 2025-02-25 DIAGNOSIS — E11.3313: ICD-10-CM

## 2025-02-25 PROCEDURE — 67028 INJECTION EYE DRUG: CPT | Mod: 58,50 | Performed by: OPHTHALMOLOGY

## 2025-02-25 PROCEDURE — 92134 CPTRZ OPH DX IMG PST SGM RTA: CPT | Performed by: OPHTHALMOLOGY

## 2025-02-25 ASSESSMENT — CONFRONTATIONAL VISUAL FIELD TEST (CVF)
OD_FINDINGS: FULL
OS_FINDINGS: FULL

## 2025-02-25 ASSESSMENT — DECREASING TEAR LAKE - SEVERITY SCORE
OD_DEC_TEARLAKE: T
OS_DEC_TEARLAKE: T

## 2025-02-25 ASSESSMENT — TONOMETRY
OS_IOP_MMHG: 14
OD_IOP_MMHG: 10

## 2025-02-25 ASSESSMENT — REFRACTION_AUTOREFRACTION
OS_CYLINDER: -2.75
OD_CYLINDER: -2.75
OS_SPHERE: -1.50
OS_AXIS: 086
OD_AXIS: 094
OD_SPHERE: -1.25

## 2025-02-25 ASSESSMENT — KERATOMETRY
METHOD_AUTO_MANUAL: AUTO
OD_AXISANGLE_DEGREES: 180
OS_K1POWER_DIOPTERS: 43.00
OD_K1POWER_DIOPTERS: 42.75
OS_AXISANGLE_DEGREES: 175
OD_K2POWER_DIOPTERS: 45.00
OS_K2POWER_DIOPTERS: 45.25

## 2025-02-25 ASSESSMENT — VISUAL ACUITY
OD_BCVA: 20/20-1
OS_BCVA: 20/50-1

## 2025-03-03 ENCOUNTER — NON-APPOINTMENT (OUTPATIENT)
Age: 80
End: 2025-03-03

## 2025-03-04 ENCOUNTER — RESULT REVIEW (OUTPATIENT)
Age: 80
End: 2025-03-04

## 2025-03-04 ENCOUNTER — APPOINTMENT (OUTPATIENT)
Dept: UROLOGY | Facility: CLINIC | Age: 80
End: 2025-03-04

## 2025-03-04 VITALS
RESPIRATION RATE: 15 BRPM | TEMPERATURE: 98 F | HEART RATE: 56 BPM | SYSTOLIC BLOOD PRESSURE: 135 MMHG | DIASTOLIC BLOOD PRESSURE: 83 MMHG | BODY MASS INDEX: 28.63 KG/M2 | WEIGHT: 200 LBS | HEIGHT: 70 IN | OXYGEN SATURATION: 97 %

## 2025-03-04 DIAGNOSIS — N40.0 BENIGN PROSTATIC HYPERPLASIA WITHOUT LOWER URINARY TRACT SYMPMS: ICD-10-CM

## 2025-03-04 DIAGNOSIS — R89.6 ABNORMAL CYTOLOGICAL FINDINGS IN SPECIMENS FROM OTHER ORGANS, SYSTEMS AND TISSUES: ICD-10-CM

## 2025-03-04 DIAGNOSIS — R32 UNSPECIFIED URINARY INCONTINENCE: ICD-10-CM

## 2025-03-04 PROCEDURE — 99214 OFFICE O/P EST MOD 30 MIN: CPT

## 2025-03-10 ENCOUNTER — OUTPATIENT (OUTPATIENT)
Dept: OUTPATIENT SERVICES | Facility: HOSPITAL | Age: 80
LOS: 1 days | End: 2025-03-10
Payer: MEDICARE

## 2025-03-10 ENCOUNTER — APPOINTMENT (OUTPATIENT)
Dept: CT IMAGING | Facility: CLINIC | Age: 80
End: 2025-03-10
Payer: MEDICARE

## 2025-03-10 DIAGNOSIS — R89.6 ABNORMAL CYTOLOGICAL FINDINGS IN SPECIMENS FROM OTHER ORGANS, SYSTEMS AND TISSUES: ICD-10-CM

## 2025-03-10 DIAGNOSIS — Z98.89 OTHER SPECIFIED POSTPROCEDURAL STATES: Chronic | ICD-10-CM

## 2025-03-10 PROCEDURE — 74176 CT ABD & PELVIS W/O CONTRAST: CPT

## 2025-03-10 PROCEDURE — 74176 CT ABD & PELVIS W/O CONTRAST: CPT | Mod: 26

## 2025-03-11 ENCOUNTER — ASC (OUTPATIENT)
Dept: URBAN - METROPOLITAN AREA SURGERY 8 | Facility: SURGERY | Age: 80
Setting detail: OPHTHALMOLOGY
End: 2025-03-11
Payer: MEDICARE

## 2025-03-11 DIAGNOSIS — E11.3312: ICD-10-CM

## 2025-03-11 PROCEDURE — 67210 TREATMENT OF RETINAL LESION: CPT | Mod: 79,LT | Performed by: OPHTHALMOLOGY

## 2025-03-11 ASSESSMENT — KERATOMETRY
OD_K1POWER_DIOPTERS: 42.75
OS_AXISANGLE_DEGREES: 175
OD_AXISANGLE_DEGREES: 180
OS_K2POWER_DIOPTERS: 45.25
OS_K1POWER_DIOPTERS: 43.00
OD_K2POWER_DIOPTERS: 45.00
METHOD_AUTO_MANUAL: AUTO

## 2025-03-11 ASSESSMENT — VISUAL ACUITY
OD_BCVA: 20/20
OS_BCVA: 20/70-1

## 2025-03-11 ASSESSMENT — CONFRONTATIONAL VISUAL FIELD TEST (CVF)
OD_FINDINGS: FULL
OS_FINDINGS: FULL

## 2025-03-11 ASSESSMENT — REFRACTION_AUTOREFRACTION
OS_CYLINDER: -2.75
OD_AXIS: 094
OS_AXIS: 086
OS_SPHERE: -1.50
OD_CYLINDER: -2.75
OD_SPHERE: -1.25

## 2025-03-11 ASSESSMENT — DECREASING TEAR LAKE - SEVERITY SCORE
OD_DEC_TEARLAKE: T
OS_DEC_TEARLAKE: T

## 2025-03-11 ASSESSMENT — TONOMETRY
OD_IOP_MMHG: 12
OS_IOP_MMHG: 15

## 2025-03-25 ENCOUNTER — APPOINTMENT (OUTPATIENT)
Dept: UROLOGY | Facility: CLINIC | Age: 80
End: 2025-03-25
Payer: MEDICARE

## 2025-03-25 VITALS
RESPIRATION RATE: 16 BRPM | DIASTOLIC BLOOD PRESSURE: 71 MMHG | HEART RATE: 58 BPM | HEIGHT: 70 IN | SYSTOLIC BLOOD PRESSURE: 152 MMHG | BODY MASS INDEX: 28.63 KG/M2 | WEIGHT: 200 LBS | OXYGEN SATURATION: 98 %

## 2025-03-25 DIAGNOSIS — R32 UNSPECIFIED URINARY INCONTINENCE: ICD-10-CM

## 2025-03-25 DIAGNOSIS — C61 MALIGNANT NEOPLASM OF PROSTATE: ICD-10-CM

## 2025-03-25 PROCEDURE — 51729 CYSTOMETROGRAM W/VP&UP: CPT

## 2025-03-25 PROCEDURE — 51798 US URINE CAPACITY MEASURE: CPT

## 2025-03-25 PROCEDURE — 51784 ANAL/URINARY MUSCLE STUDY: CPT

## 2025-03-25 PROCEDURE — 51741 ELECTRO-UROFLOWMETRY FIRST: CPT

## 2025-03-25 PROCEDURE — 51797 INTRAABDOMINAL PRESSURE TEST: CPT

## 2025-04-08 ENCOUNTER — APPOINTMENT (OUTPATIENT)
Dept: UROLOGY | Facility: CLINIC | Age: 80
End: 2025-04-08
Payer: MEDICARE

## 2025-04-08 VITALS
HEART RATE: 57 BPM | BODY MASS INDEX: 27.92 KG/M2 | WEIGHT: 195 LBS | OXYGEN SATURATION: 98 % | SYSTOLIC BLOOD PRESSURE: 172 MMHG | HEIGHT: 70 IN | DIASTOLIC BLOOD PRESSURE: 76 MMHG

## 2025-04-08 DIAGNOSIS — R89.6 ABNORMAL CYTOLOGICAL FINDINGS IN SPECIMENS FROM OTHER ORGANS, SYSTEMS AND TISSUES: ICD-10-CM

## 2025-04-08 PROCEDURE — 52000 CYSTOURETHROSCOPY: CPT

## 2025-04-21 ENCOUNTER — APPOINTMENT (OUTPATIENT)
Dept: NEUROLOGY | Facility: CLINIC | Age: 80
End: 2025-04-21
Payer: MEDICARE

## 2025-04-21 VITALS
SYSTOLIC BLOOD PRESSURE: 146 MMHG | HEIGHT: 70 IN | HEART RATE: 67 BPM | BODY MASS INDEX: 27.92 KG/M2 | DIASTOLIC BLOOD PRESSURE: 72 MMHG | WEIGHT: 195 LBS

## 2025-04-21 DIAGNOSIS — R26.81 UNSTEADINESS ON FEET: ICD-10-CM

## 2025-04-21 DIAGNOSIS — M54.50 LOW BACK PAIN, UNSPECIFIED: ICD-10-CM

## 2025-04-21 DIAGNOSIS — G31.84 MILD COGNITIVE IMPAIRMENT, SO STATED: ICD-10-CM

## 2025-04-21 DIAGNOSIS — G40.209 LOCALIZATION-RELATED (FOCAL) (PARTIAL) SYMPTOMATIC EPILEPSY AND EPILEPTIC SYNDROMES WITH COMPLEX PARTIAL SEIZURES, NOT INTRACTABLE, W/OUT STATUS EPILEPTICUS: ICD-10-CM

## 2025-04-21 DIAGNOSIS — R56.9 UNSPECIFIED CONVULSIONS: ICD-10-CM

## 2025-04-21 PROCEDURE — G2211 COMPLEX E/M VISIT ADD ON: CPT

## 2025-04-21 PROCEDURE — 99213 OFFICE O/P EST LOW 20 MIN: CPT

## 2025-04-21 RX ORDER — DONEPEZIL HYDROCHLORIDE 10 MG/1
10 TABLET ORAL
Qty: 90 | Refills: 3 | Status: ACTIVE | COMMUNITY
Start: 2025-04-21 | End: 1900-01-01

## 2025-05-13 ENCOUNTER — OFFICE (OUTPATIENT)
Dept: URBAN - METROPOLITAN AREA CLINIC 94 | Facility: CLINIC | Age: 80
Setting detail: OPHTHALMOLOGY
End: 2025-05-13
Payer: MEDICARE

## 2025-05-13 DIAGNOSIS — E11.3311: ICD-10-CM

## 2025-05-13 DIAGNOSIS — E11.3313: ICD-10-CM

## 2025-05-13 PROCEDURE — 67028 INJECTION EYE DRUG: CPT | Mod: 79,RT | Performed by: OPHTHALMOLOGY

## 2025-05-13 PROCEDURE — 92134 CPTRZ OPH DX IMG PST SGM RTA: CPT | Performed by: OPHTHALMOLOGY

## 2025-05-13 ASSESSMENT — REFRACTION_AUTOREFRACTION
OD_AXIS: 094
OS_SPHERE: -1.50
OD_CYLINDER: -2.75
OS_AXIS: 086
OS_CYLINDER: -2.75
OD_SPHERE: -1.25

## 2025-05-13 ASSESSMENT — CONFRONTATIONAL VISUAL FIELD TEST (CVF)
OS_FINDINGS: FULL
OD_FINDINGS: FULL

## 2025-05-13 ASSESSMENT — KERATOMETRY
OS_K1POWER_DIOPTERS: 43.00
OD_K2POWER_DIOPTERS: 45.00
METHOD_AUTO_MANUAL: AUTO
OS_K2POWER_DIOPTERS: 45.25
OD_AXISANGLE_DEGREES: 180
OD_K1POWER_DIOPTERS: 42.75
OS_AXISANGLE_DEGREES: 175

## 2025-05-13 ASSESSMENT — VISUAL ACUITY
OD_BCVA: 20/20-1
OS_BCVA: 20/60

## 2025-05-13 ASSESSMENT — DECREASING TEAR LAKE - SEVERITY SCORE
OD_DEC_TEARLAKE: T
OS_DEC_TEARLAKE: T

## 2025-05-13 ASSESSMENT — TONOMETRY
OS_IOP_MMHG: 14
OD_IOP_MMHG: 12

## 2025-05-20 ENCOUNTER — ASC (OUTPATIENT)
Dept: URBAN - METROPOLITAN AREA SURGERY 8 | Facility: SURGERY | Age: 80
Setting detail: OPHTHALMOLOGY
End: 2025-05-20
Payer: MEDICARE

## 2025-05-20 DIAGNOSIS — E11.3311: ICD-10-CM

## 2025-05-20 PROCEDURE — 67210 TREATMENT OF RETINAL LESION: CPT | Mod: 79,RT | Performed by: OPHTHALMOLOGY

## 2025-05-20 ASSESSMENT — REFRACTION_AUTOREFRACTION
OD_CYLINDER: -2.75
OS_AXIS: 086
OS_SPHERE: -1.50
OD_AXIS: 094
OS_CYLINDER: -2.75
OD_SPHERE: -1.25

## 2025-05-20 ASSESSMENT — CONFRONTATIONAL VISUAL FIELD TEST (CVF)
OD_FINDINGS: FULL
OS_FINDINGS: FULL

## 2025-05-20 ASSESSMENT — KERATOMETRY
METHOD_AUTO_MANUAL: AUTO
OD_K1POWER_DIOPTERS: 42.75
OS_AXISANGLE_DEGREES: 175
OD_K2POWER_DIOPTERS: 45.00
OD_AXISANGLE_DEGREES: 180
OS_K1POWER_DIOPTERS: 43.00
OS_K2POWER_DIOPTERS: 45.25

## 2025-05-20 ASSESSMENT — VISUAL ACUITY
OD_BCVA: 20/25
OS_BCVA: 20/100

## 2025-05-20 ASSESSMENT — DECREASING TEAR LAKE - SEVERITY SCORE
OS_DEC_TEARLAKE: T
OD_DEC_TEARLAKE: T

## 2025-05-20 ASSESSMENT — TONOMETRY
OS_IOP_MMHG: 14
OD_IOP_MMHG: 11

## 2025-05-21 ENCOUNTER — OUTPATIENT (OUTPATIENT)
Dept: OUTPATIENT SERVICES | Facility: HOSPITAL | Age: 80
LOS: 1 days | End: 2025-05-21
Payer: MEDICARE

## 2025-05-21 DIAGNOSIS — R79.89 OTHER SPECIFIED ABNORMAL FINDINGS OF BLOOD CHEMISTRY: ICD-10-CM

## 2025-05-21 DIAGNOSIS — Z98.89 OTHER SPECIFIED POSTPROCEDURAL STATES: Chronic | ICD-10-CM

## 2025-05-21 PROCEDURE — A9567: CPT

## 2025-05-21 PROCEDURE — A9540: CPT

## 2025-05-21 PROCEDURE — 71046 X-RAY EXAM CHEST 2 VIEWS: CPT | Mod: 26

## 2025-05-21 PROCEDURE — 78582 LUNG VENTILAT&PERFUS IMAGING: CPT | Mod: 26

## 2025-05-21 PROCEDURE — 71046 X-RAY EXAM CHEST 2 VIEWS: CPT

## 2025-05-21 PROCEDURE — 78582 LUNG VENTILAT&PERFUS IMAGING: CPT

## 2025-05-22 DIAGNOSIS — R79.89 OTHER SPECIFIED ABNORMAL FINDINGS OF BLOOD CHEMISTRY: ICD-10-CM

## 2025-05-27 ENCOUNTER — OFFICE (OUTPATIENT)
Dept: URBAN - METROPOLITAN AREA CLINIC 102 | Facility: CLINIC | Age: 80
Setting detail: OPHTHALMOLOGY
End: 2025-05-27
Payer: MEDICARE

## 2025-05-27 DIAGNOSIS — E11.3312: ICD-10-CM

## 2025-05-27 DIAGNOSIS — E11.3313: ICD-10-CM

## 2025-05-27 DIAGNOSIS — Z96.1: ICD-10-CM

## 2025-05-27 DIAGNOSIS — H26.493: ICD-10-CM

## 2025-05-27 DIAGNOSIS — E11.3311: ICD-10-CM

## 2025-05-27 PROCEDURE — 99024 POSTOP FOLLOW-UP VISIT: CPT | Performed by: OPHTHALMOLOGY

## 2025-05-27 PROCEDURE — 92134 CPTRZ OPH DX IMG PST SGM RTA: CPT | Performed by: OPHTHALMOLOGY

## 2025-05-27 ASSESSMENT — KERATOMETRY
OS_K1POWER_DIOPTERS: 42.50
OS_K2POWER_DIOPTERS: 45.50
OD_AXISANGLE_DEGREES: 004
OS_AXISANGLE_DEGREES: 175
OD_K1POWER_DIOPTERS: 42.50
OD_K2POWER_DIOPTERS: 42.25
METHOD_AUTO_MANUAL: AUTO

## 2025-05-27 ASSESSMENT — DECREASING TEAR LAKE - SEVERITY SCORE
OS_DEC_TEARLAKE: T
OD_DEC_TEARLAKE: T

## 2025-05-27 ASSESSMENT — VISUAL ACUITY
OS_BCVA: 20/100
OD_BCVA: 20/20

## 2025-05-27 ASSESSMENT — CONFRONTATIONAL VISUAL FIELD TEST (CVF)
OS_FINDINGS: FULL
OD_FINDINGS: FULL

## 2025-05-27 ASSESSMENT — REFRACTION_AUTOREFRACTION
OD_CYLINDER: -2.50
OS_CYLINDER: -2.75
OS_SPHERE: -1.50
OD_SPHERE: -1.75
OD_AXIS: 101
OS_AXIS: 089

## 2025-05-27 ASSESSMENT — REFRACTION_MANIFEST
OD_SPHERE: -1.75
OS_AXIS: 090
OD_CYLINDER: -2.25
OS_ADD: +2.50
OD_AXIS: 090
OS_SPHERE: -1.50
OD_ADD: +2.50
OS_CYLINDER: -2.50

## 2025-05-27 ASSESSMENT — TONOMETRY
OS_IOP_MMHG: 14
OD_IOP_MMHG: 12

## 2025-06-03 ENCOUNTER — APPOINTMENT (OUTPATIENT)
Dept: UROLOGY | Facility: CLINIC | Age: 80
End: 2025-06-03
Payer: MEDICARE

## 2025-06-03 DIAGNOSIS — N31.2 FLACCID NEUROPATHIC BLADDER, NOT ELSEWHERE CLASSIFIED: ICD-10-CM

## 2025-06-03 PROCEDURE — 99213 OFFICE O/P EST LOW 20 MIN: CPT

## 2025-06-18 ENCOUNTER — RX RENEWAL (OUTPATIENT)
Age: 80
End: 2025-06-18

## 2025-07-22 ENCOUNTER — OFFICE (OUTPATIENT)
Dept: URBAN - METROPOLITAN AREA CLINIC 94 | Facility: CLINIC | Age: 80
Setting detail: OPHTHALMOLOGY
End: 2025-07-22
Payer: MEDICARE

## 2025-07-22 DIAGNOSIS — E11.3313: ICD-10-CM

## 2025-07-22 DIAGNOSIS — E11.3312: ICD-10-CM

## 2025-07-22 PROCEDURE — 67210 TREATMENT OF RETINAL LESION: CPT | Mod: 79,LT | Performed by: OPHTHALMOLOGY

## 2025-07-22 PROCEDURE — 92134 CPTRZ OPH DX IMG PST SGM RTA: CPT | Performed by: OPHTHALMOLOGY

## 2025-07-22 ASSESSMENT — VISUAL ACUITY
OD_BCVA: 20/25
OS_BCVA: 20/80+1

## 2025-07-22 ASSESSMENT — DECREASING TEAR LAKE - SEVERITY SCORE
OD_DEC_TEARLAKE: T
OS_DEC_TEARLAKE: T

## 2025-07-22 ASSESSMENT — CONFRONTATIONAL VISUAL FIELD TEST (CVF)
OS_FINDINGS: FULL
OD_FINDINGS: FULL

## 2025-07-22 ASSESSMENT — REFRACTION_AUTOREFRACTION
OS_CYLINDER: -2.75
OD_AXIS: 101
OS_SPHERE: -1.50
OD_SPHERE: -1.75
OS_AXIS: 089
OD_CYLINDER: -2.50

## 2025-07-22 ASSESSMENT — KERATOMETRY
METHOD_AUTO_MANUAL: AUTO
OD_AXISANGLE_DEGREES: 004
OS_K1POWER_DIOPTERS: 42.50
OS_K2POWER_DIOPTERS: 45.50
OD_K2POWER_DIOPTERS: 42.25
OD_K1POWER_DIOPTERS: 42.50
OS_AXISANGLE_DEGREES: 175

## 2025-07-22 ASSESSMENT — TONOMETRY
OD_IOP_MMHG: 12
OS_IOP_MMHG: 13

## 2025-08-08 ENCOUNTER — RX RENEWAL (OUTPATIENT)
Age: 80
End: 2025-08-08

## 2025-08-12 ENCOUNTER — OFFICE (OUTPATIENT)
Dept: URBAN - METROPOLITAN AREA CLINIC 94 | Facility: CLINIC | Age: 80
Setting detail: OPHTHALMOLOGY
End: 2025-08-12
Payer: MEDICARE

## 2025-08-12 DIAGNOSIS — E11.3311: ICD-10-CM

## 2025-08-12 DIAGNOSIS — E11.3313: ICD-10-CM

## 2025-08-12 PROBLEM — E11.3312 DM TYPE 2; RIGHT MOD WITH ME, LEFT MOD WITH ME: Status: ACTIVE | Noted: 2025-08-12

## 2025-08-12 PROCEDURE — 92134 CPTRZ OPH DX IMG PST SGM RTA: CPT | Performed by: OPHTHALMOLOGY

## 2025-08-12 PROCEDURE — 67028 INJECTION EYE DRUG: CPT | Mod: 58,RT | Performed by: OPHTHALMOLOGY

## 2025-08-12 ASSESSMENT — KERATOMETRY
METHOD_AUTO_MANUAL: AUTO
OS_K2POWER_DIOPTERS: 45.50
OD_K2POWER_DIOPTERS: 42.25
OS_K1POWER_DIOPTERS: 42.50
OD_K1POWER_DIOPTERS: 42.50
OD_AXISANGLE_DEGREES: 004
OS_AXISANGLE_DEGREES: 175

## 2025-08-12 ASSESSMENT — TONOMETRY
OS_IOP_MMHG: 15
OD_IOP_MMHG: 11

## 2025-08-12 ASSESSMENT — REFRACTION_AUTOREFRACTION
OD_AXIS: 101
OD_SPHERE: -1.75
OS_AXIS: 089
OS_CYLINDER: -2.75
OS_SPHERE: -1.50
OD_CYLINDER: -2.50

## 2025-08-12 ASSESSMENT — DECREASING TEAR LAKE - SEVERITY SCORE
OS_DEC_TEARLAKE: T
OD_DEC_TEARLAKE: T

## 2025-08-12 ASSESSMENT — CONFRONTATIONAL VISUAL FIELD TEST (CVF)
OD_FINDINGS: FULL
OS_FINDINGS: FULL

## 2025-08-12 ASSESSMENT — VISUAL ACUITY
OD_BCVA: 20/20-
OS_BCVA: 20/60-

## 2025-09-05 ENCOUNTER — APPOINTMENT (OUTPATIENT)
Dept: NEUROLOGY | Facility: CLINIC | Age: 80
End: 2025-09-05
Payer: MEDICARE

## 2025-09-05 VITALS
HEART RATE: 69 BPM | HEIGHT: 70 IN | WEIGHT: 195 LBS | SYSTOLIC BLOOD PRESSURE: 135 MMHG | BODY MASS INDEX: 27.92 KG/M2 | DIASTOLIC BLOOD PRESSURE: 72 MMHG | RESPIRATION RATE: 15 BRPM

## 2025-09-05 DIAGNOSIS — R26.81 UNSTEADINESS ON FEET: ICD-10-CM

## 2025-09-05 DIAGNOSIS — R42 DIZZINESS AND GIDDINESS: ICD-10-CM

## 2025-09-05 DIAGNOSIS — R56.9 UNSPECIFIED CONVULSIONS: ICD-10-CM

## 2025-09-05 DIAGNOSIS — G31.84 MILD COGNITIVE IMPAIRMENT, SO STATED: ICD-10-CM

## 2025-09-05 PROCEDURE — G2211 COMPLEX E/M VISIT ADD ON: CPT

## 2025-09-05 PROCEDURE — 99215 OFFICE O/P EST HI 40 MIN: CPT

## 2025-09-05 RX ORDER — MEMANTINE 5 MG/1
5 TABLET ORAL
Qty: 60 | Refills: 5 | Status: ACTIVE | COMMUNITY
Start: 2025-09-05 | End: 1900-01-01